# Patient Record
Sex: FEMALE | Race: WHITE | Employment: OTHER | URBAN - METROPOLITAN AREA
[De-identification: names, ages, dates, MRNs, and addresses within clinical notes are randomized per-mention and may not be internally consistent; named-entity substitution may affect disease eponyms.]

---

## 2017-01-17 ENCOUNTER — HOSPITAL ENCOUNTER (OUTPATIENT)
Dept: CT IMAGING | Age: 57
Discharge: HOME | End: 2017-01-17
Payer: MEDICARE

## 2017-01-17 DIAGNOSIS — R91.8 MULTIPLE LUNG NODULES ON CT: ICD-10-CM

## 2017-01-17 PROCEDURE — 71250 CT THORAX DX C-: CPT

## 2017-02-01 RX ORDER — SPIRONOLACTONE 25 MG/1
25 TABLET ORAL 2 TIMES DAILY
Qty: 60 TABLET | Refills: 0 | Status: SHIPPED | OUTPATIENT
Start: 2017-02-01 | End: 2017-03-07 | Stop reason: SDUPTHER

## 2017-02-07 ENCOUNTER — TELEPHONE (OUTPATIENT)
Dept: FAMILY MEDICINE CLINIC | Age: 57
End: 2017-02-07

## 2017-03-07 ENCOUNTER — OFFICE VISIT (OUTPATIENT)
Dept: PULMONOLOGY | Age: 57
End: 2017-03-07

## 2017-03-07 VITALS
TEMPERATURE: 98.3 F | OXYGEN SATURATION: 99 % | WEIGHT: 125 LBS | SYSTOLIC BLOOD PRESSURE: 120 MMHG | RESPIRATION RATE: 16 BRPM | HEART RATE: 70 BPM | HEIGHT: 62 IN | DIASTOLIC BLOOD PRESSURE: 74 MMHG | BODY MASS INDEX: 23 KG/M2

## 2017-03-07 DIAGNOSIS — J44.9 CHRONIC OBSTRUCTIVE PULMONARY DISEASE, UNSPECIFIED COPD TYPE (HCC): Primary | ICD-10-CM

## 2017-03-07 DIAGNOSIS — L25.9 CONTACT DERMATITIS, UNSPECIFIED CONTACT DERMATITIS TYPE, UNSPECIFIED TRIGGER: ICD-10-CM

## 2017-03-07 PROCEDURE — 99213 OFFICE O/P EST LOW 20 MIN: CPT | Performed by: INTERNAL MEDICINE

## 2017-03-07 RX ORDER — SPIRONOLACTONE 25 MG/1
25 TABLET ORAL 2 TIMES DAILY
Qty: 180 TABLET | Refills: 1 | Status: ON HOLD | OUTPATIENT
Start: 2017-03-07 | End: 2017-05-07 | Stop reason: HOSPADM

## 2017-03-07 ASSESSMENT — ENCOUNTER SYMPTOMS
SHORTNESS OF BREATH: 1
RHINORRHEA: 0
CHEST TIGHTNESS: 0
WHEEZING: 1
SINUS PRESSURE: 0
DIARRHEA: 0
ABDOMINAL PAIN: 0
SORE THROAT: 0
COUGH: 1
NAUSEA: 0
VOMITING: 0

## 2017-05-03 ENCOUNTER — HOSPITAL ENCOUNTER (INPATIENT)
Age: 57
LOS: 4 days | Discharge: HOME | DRG: 440 | End: 2017-05-07
Attending: EMERGENCY MEDICINE | Admitting: INTERNAL MEDICINE
Payer: MEDICARE

## 2017-05-03 ENCOUNTER — APPOINTMENT (OUTPATIENT)
Dept: CT IMAGING | Age: 57
DRG: 440 | End: 2017-05-03
Payer: MEDICARE

## 2017-05-03 ENCOUNTER — APPOINTMENT (OUTPATIENT)
Dept: GENERAL RADIOLOGY | Age: 57
DRG: 440 | End: 2017-05-03
Payer: MEDICARE

## 2017-05-03 DIAGNOSIS — K85.00 IDIOPATHIC ACUTE PANCREATITIS WITHOUT INFECTION OR NECROSIS: Primary | ICD-10-CM

## 2017-05-03 LAB
ALBUMIN SERPL-MCNC: 4.1 G/DL (ref 3.9–4.9)
ALP BLD-CCNC: 98 U/L (ref 40–130)
ALT SERPL-CCNC: 32 U/L (ref 0–33)
ANION GAP SERPL CALCULATED.3IONS-SCNC: 14 MEQ/L (ref 7–13)
AST SERPL-CCNC: 72 U/L (ref 0–35)
BASOPHILS ABSOLUTE: 0 K/UL (ref 0–0.2)
BASOPHILS RELATIVE PERCENT: 0.2 %
BILIRUB SERPL-MCNC: 1.1 MG/DL (ref 0–1.2)
BUN BLDV-MCNC: 12 MG/DL (ref 6–20)
CALCIUM SERPL-MCNC: 9.4 MG/DL (ref 8.6–10.2)
CHLORIDE BLD-SCNC: 101 MEQ/L (ref 98–107)
CO2: 27 MEQ/L (ref 22–29)
CREAT SERPL-MCNC: 0.64 MG/DL (ref 0.5–0.9)
EOSINOPHILS ABSOLUTE: 0.1 K/UL (ref 0–0.7)
EOSINOPHILS RELATIVE PERCENT: 0.6 %
GFR AFRICAN AMERICAN: >60
GFR NON-AFRICAN AMERICAN: >60
GLOBULIN: 2.9 G/DL (ref 2.3–3.5)
GLUCOSE BLD-MCNC: 119 MG/DL (ref 74–109)
HCT VFR BLD CALC: 39.8 % (ref 37–47)
HCT VFR BLD CALC: 41.1 % (ref 37–47)
HEMOGLOBIN: 13.2 G/DL (ref 12–16)
HEMOGLOBIN: 13.7 G/DL (ref 12–16)
LACTIC ACID: 3.4 MMOL/L (ref 0.5–2.2)
LIPASE: >600 U/L (ref 13–60)
LYMPHOCYTES ABSOLUTE: 1.6 K/UL (ref 1–4.8)
LYMPHOCYTES RELATIVE PERCENT: 11.9 %
MCH RBC QN AUTO: 31.7 PG (ref 27–31.3)
MCH RBC QN AUTO: 32.1 PG (ref 27–31.3)
MCHC RBC AUTO-ENTMCNC: 33.2 % (ref 33–37)
MCHC RBC AUTO-ENTMCNC: 33.4 % (ref 33–37)
MCV RBC AUTO: 94.7 FL (ref 82–100)
MCV RBC AUTO: 96.8 FL (ref 82–100)
MONOCYTES ABSOLUTE: 0.6 K/UL (ref 0.2–0.8)
MONOCYTES RELATIVE PERCENT: 4.5 %
NEUTROPHILS ABSOLUTE: 11.2 K/UL (ref 1.4–6.5)
NEUTROPHILS RELATIVE PERCENT: 82.8 %
PDW BLD-RTO: 14.2 % (ref 11.5–14.5)
PDW BLD-RTO: 14.4 % (ref 11.5–14.5)
PLATELET # BLD: 132 K/UL (ref 130–400)
PLATELET # BLD: 163 K/UL (ref 130–400)
POC CREATININE WHOLE BLOOD: NORMAL
POTASSIUM SERPL-SCNC: 4.2 MEQ/L (ref 3.5–5.1)
RBC # BLD: 4.11 M/UL (ref 4.2–5.4)
RBC # BLD: 4.34 M/UL (ref 4.2–5.4)
SODIUM BLD-SCNC: 142 MEQ/L (ref 132–144)
TOTAL PROTEIN: 7 G/DL (ref 6.4–8.1)
WBC # BLD: 10.2 K/UL (ref 4.8–10.8)
WBC # BLD: 13.5 K/UL (ref 4.8–10.8)

## 2017-05-03 PROCEDURE — 96374 THER/PROPH/DIAG INJ IV PUSH: CPT

## 2017-05-03 PROCEDURE — 1210000000 HC MED SURG R&B

## 2017-05-03 PROCEDURE — 6360000002 HC RX W HCPCS

## 2017-05-03 PROCEDURE — 6360000002 HC RX W HCPCS: Performed by: NURSE PRACTITIONER

## 2017-05-03 PROCEDURE — 83690 ASSAY OF LIPASE: CPT

## 2017-05-03 PROCEDURE — 96375 TX/PRO/DX INJ NEW DRUG ADDON: CPT

## 2017-05-03 PROCEDURE — 74176 CT ABD & PELVIS W/O CONTRAST: CPT

## 2017-05-03 PROCEDURE — 99285 EMERGENCY DEPT VISIT HI MDM: CPT

## 2017-05-03 PROCEDURE — 80053 COMPREHEN METABOLIC PANEL: CPT

## 2017-05-03 PROCEDURE — 85025 COMPLETE CBC W/AUTO DIFF WBC: CPT

## 2017-05-03 PROCEDURE — 71010 XR CHEST PORTABLE: CPT

## 2017-05-03 PROCEDURE — 2580000003 HC RX 258: Performed by: NURSE PRACTITIONER

## 2017-05-03 PROCEDURE — 36415 COLL VENOUS BLD VENIPUNCTURE: CPT

## 2017-05-03 PROCEDURE — 6360000002 HC RX W HCPCS: Performed by: EMERGENCY MEDICINE

## 2017-05-03 PROCEDURE — 83605 ASSAY OF LACTIC ACID: CPT

## 2017-05-03 PROCEDURE — 2580000003 HC RX 258: Performed by: EMERGENCY MEDICINE

## 2017-05-03 PROCEDURE — 85027 COMPLETE CBC AUTOMATED: CPT

## 2017-05-03 RX ORDER — 0.9 % SODIUM CHLORIDE 0.9 %
1000 INTRAVENOUS SOLUTION INTRAVENOUS ONCE
Status: COMPLETED | OUTPATIENT
Start: 2017-05-03 | End: 2017-05-03

## 2017-05-03 RX ORDER — SODIUM CHLORIDE 0.9 % (FLUSH) 0.9 %
10 SYRINGE (ML) INJECTION EVERY 12 HOURS SCHEDULED
Status: DISCONTINUED | OUTPATIENT
Start: 2017-05-03 | End: 2017-05-07 | Stop reason: HOSPADM

## 2017-05-03 RX ORDER — SODIUM CHLORIDE 0.9 % (FLUSH) 0.9 %
10 SYRINGE (ML) INJECTION PRN
Status: DISCONTINUED | OUTPATIENT
Start: 2017-05-03 | End: 2017-05-07 | Stop reason: HOSPADM

## 2017-05-03 RX ORDER — ACETAMINOPHEN 325 MG/1
650 TABLET ORAL EVERY 4 HOURS PRN
Status: DISCONTINUED | OUTPATIENT
Start: 2017-05-03 | End: 2017-05-07 | Stop reason: HOSPADM

## 2017-05-03 RX ORDER — ONDANSETRON 2 MG/ML
4 INJECTION INTRAMUSCULAR; INTRAVENOUS EVERY 6 HOURS PRN
Status: DISCONTINUED | OUTPATIENT
Start: 2017-05-03 | End: 2017-05-07 | Stop reason: HOSPADM

## 2017-05-03 RX ORDER — MORPHINE SULFATE 4 MG/ML
4 INJECTION, SOLUTION INTRAMUSCULAR; INTRAVENOUS
Status: DISCONTINUED | OUTPATIENT
Start: 2017-05-03 | End: 2017-05-07 | Stop reason: HOSPADM

## 2017-05-03 RX ORDER — ONDANSETRON 2 MG/ML
4 INJECTION INTRAMUSCULAR; INTRAVENOUS ONCE
Status: COMPLETED | OUTPATIENT
Start: 2017-05-03 | End: 2017-05-03

## 2017-05-03 RX ORDER — SODIUM CHLORIDE 9 MG/ML
INJECTION, SOLUTION INTRAVENOUS CONTINUOUS
Status: DISCONTINUED | OUTPATIENT
Start: 2017-05-03 | End: 2017-05-07 | Stop reason: HOSPADM

## 2017-05-03 RX ADMIN — SODIUM CHLORIDE 1000 ML: 9 INJECTION, SOLUTION INTRAVENOUS at 14:27

## 2017-05-03 RX ADMIN — HYDROMORPHONE HYDROCHLORIDE: 1 INJECTION, SOLUTION INTRAMUSCULAR; INTRAVENOUS; SUBCUTANEOUS at 15:20

## 2017-05-03 RX ADMIN — ONDANSETRON 4 MG: 2 INJECTION, SOLUTION INTRAMUSCULAR; INTRAVENOUS at 14:27

## 2017-05-03 RX ADMIN — ENOXAPARIN SODIUM 40 MG: 40 INJECTION SUBCUTANEOUS at 19:39

## 2017-05-03 RX ADMIN — MORPHINE SULFATE 4 MG: 4 INJECTION, SOLUTION INTRAMUSCULAR; INTRAVENOUS at 14:27

## 2017-05-03 RX ADMIN — Medication: at 15:20

## 2017-05-03 RX ADMIN — SODIUM CHLORIDE: 9 INJECTION, SOLUTION INTRAVENOUS at 19:23

## 2017-05-03 RX ADMIN — HYDROMORPHONE HYDROCHLORIDE 0.5 MG: 1 INJECTION, SOLUTION INTRAMUSCULAR; INTRAVENOUS; SUBCUTANEOUS at 23:34

## 2017-05-03 RX ADMIN — Medication 10 ML: at 19:44

## 2017-05-03 RX ADMIN — HYDROMORPHONE HYDROCHLORIDE 0.5 MG: 1 INJECTION, SOLUTION INTRAMUSCULAR; INTRAVENOUS; SUBCUTANEOUS at 19:40

## 2017-05-03 ASSESSMENT — PAIN SCALES - GENERAL
PAINLEVEL_OUTOF10: 10
PAINLEVEL_OUTOF10: 0
PAINLEVEL_OUTOF10: 8
PAINLEVEL_OUTOF10: 0
PAINLEVEL_OUTOF10: 9
PAINLEVEL_OUTOF10: 8
PAINLEVEL_OUTOF10: 9
PAINLEVEL_OUTOF10: 3
PAINLEVEL_OUTOF10: 8
PAINLEVEL_OUTOF10: 9
PAINLEVEL_OUTOF10: 0

## 2017-05-03 ASSESSMENT — ENCOUNTER SYMPTOMS
ALLERGIC/IMMUNOLOGIC NEGATIVE: 1
RHINORRHEA: 0
NAUSEA: 1
ABDOMINAL PAIN: 1
TROUBLE SWALLOWING: 0
SHORTNESS OF BREATH: 0
EYES NEGATIVE: 1
VOMITING: 1
WHEEZING: 0

## 2017-05-03 ASSESSMENT — PAIN DESCRIPTION - DESCRIPTORS
DESCRIPTORS: ACHING
DESCRIPTORS: ACHING;DISCOMFORT;PRESSURE
DESCRIPTORS: ACHING;CONSTANT

## 2017-05-03 ASSESSMENT — PAIN DESCRIPTION - ONSET
ONSET: ON-GOING
ONSET: PROGRESSIVE

## 2017-05-03 ASSESSMENT — PAIN DESCRIPTION - LOCATION
LOCATION: ABDOMEN

## 2017-05-03 ASSESSMENT — PAIN DESCRIPTION - PAIN TYPE
TYPE: ACUTE PAIN

## 2017-05-03 ASSESSMENT — PAIN DESCRIPTION - PROGRESSION: CLINICAL_PROGRESSION: NOT CHANGED

## 2017-05-03 ASSESSMENT — PAIN DESCRIPTION - FREQUENCY
FREQUENCY: CONTINUOUS
FREQUENCY: CONTINUOUS

## 2017-05-03 ASSESSMENT — PAIN SCALES - WONG BAKER: WONGBAKER_NUMERICALRESPONSE: 4

## 2017-05-04 ENCOUNTER — APPOINTMENT (OUTPATIENT)
Dept: NUCLEAR MEDICINE | Age: 57
DRG: 440 | End: 2017-05-04
Payer: MEDICARE

## 2017-05-04 LAB
ALBUMIN SERPL-MCNC: 3.4 G/DL (ref 3.9–4.9)
ALP BLD-CCNC: 71 U/L (ref 40–130)
ALT SERPL-CCNC: 20 U/L (ref 0–33)
AMYLASE: 575 U/L (ref 28–100)
ANION GAP SERPL CALCULATED.3IONS-SCNC: 13 MEQ/L (ref 7–13)
AST SERPL-CCNC: 30 U/L (ref 0–35)
BASOPHILS ABSOLUTE: 0 K/UL (ref 0–0.2)
BASOPHILS RELATIVE PERCENT: 0.3 %
BILIRUB SERPL-MCNC: 0.8 MG/DL (ref 0–1.2)
BUN BLDV-MCNC: 16 MG/DL (ref 6–20)
CALCIUM SERPL-MCNC: 7.6 MG/DL (ref 8.6–10.2)
CHLORIDE BLD-SCNC: 102 MEQ/L (ref 98–107)
CO2: 24 MEQ/L (ref 22–29)
CREAT SERPL-MCNC: 0.57 MG/DL (ref 0.5–0.9)
EOSINOPHILS ABSOLUTE: 0.1 K/UL (ref 0–0.7)
EOSINOPHILS RELATIVE PERCENT: 0.7 %
GFR AFRICAN AMERICAN: >60
GFR NON-AFRICAN AMERICAN: >60
GLOBULIN: 2.3 G/DL (ref 2.3–3.5)
GLUCOSE BLD-MCNC: 80 MG/DL (ref 74–109)
HCT VFR BLD CALC: 34.5 % (ref 37–47)
HEMOGLOBIN: 11.5 G/DL (ref 12–16)
LACTATE DEHYDROGENASE: 148 U/L (ref 135–214)
LIPASE: >600 U/L (ref 13–60)
LYMPHOCYTES ABSOLUTE: 3 K/UL (ref 1–4.8)
LYMPHOCYTES RELATIVE PERCENT: 24.8 %
MCH RBC QN AUTO: 31.6 PG (ref 27–31.3)
MCHC RBC AUTO-ENTMCNC: 33.4 % (ref 33–37)
MCV RBC AUTO: 94.5 FL (ref 82–100)
MONOCYTES ABSOLUTE: 0.5 K/UL (ref 0.2–0.8)
MONOCYTES RELATIVE PERCENT: 4.2 %
NEUTROPHILS ABSOLUTE: 8.5 K/UL (ref 1.4–6.5)
NEUTROPHILS RELATIVE PERCENT: 70 %
PDW BLD-RTO: 14.3 % (ref 11.5–14.5)
PLATELET # BLD: 134 K/UL (ref 130–400)
POTASSIUM SERPL-SCNC: 3.8 MEQ/L (ref 3.5–5.1)
RBC # BLD: 3.66 M/UL (ref 4.2–5.4)
SODIUM BLD-SCNC: 139 MEQ/L (ref 132–144)
TOTAL PROTEIN: 5.7 G/DL (ref 6.4–8.1)
WBC # BLD: 12.2 K/UL (ref 4.8–10.8)

## 2017-05-04 PROCEDURE — 83690 ASSAY OF LIPASE: CPT

## 2017-05-04 PROCEDURE — 36415 COLL VENOUS BLD VENIPUNCTURE: CPT

## 2017-05-04 PROCEDURE — 6360000002 HC RX W HCPCS: Performed by: INTERNAL MEDICINE

## 2017-05-04 PROCEDURE — 94664 DEMO&/EVAL PT USE INHALER: CPT

## 2017-05-04 PROCEDURE — 3430000000 HC RX DIAGNOSTIC RADIOPHARMACEUTICAL: Performed by: SURGERY

## 2017-05-04 PROCEDURE — 94640 AIRWAY INHALATION TREATMENT: CPT

## 2017-05-04 PROCEDURE — 83615 LACTATE (LD) (LDH) ENZYME: CPT

## 2017-05-04 PROCEDURE — A9537 TC99M MEBROFENIN: HCPCS | Performed by: SURGERY

## 2017-05-04 PROCEDURE — 2580000003 HC RX 258: Performed by: NURSE PRACTITIONER

## 2017-05-04 PROCEDURE — 6370000000 HC RX 637 (ALT 250 FOR IP): Performed by: INTERNAL MEDICINE

## 2017-05-04 PROCEDURE — 78227 HEPATOBIL SYST IMAGE W/DRUG: CPT

## 2017-05-04 PROCEDURE — 1210000000 HC MED SURG R&B

## 2017-05-04 PROCEDURE — 82150 ASSAY OF AMYLASE: CPT

## 2017-05-04 PROCEDURE — 85025 COMPLETE CBC W/AUTO DIFF WBC: CPT

## 2017-05-04 PROCEDURE — 80053 COMPREHEN METABOLIC PANEL: CPT

## 2017-05-04 PROCEDURE — 6360000002 HC RX W HCPCS: Performed by: NURSE PRACTITIONER

## 2017-05-04 RX ORDER — KETOROLAC TROMETHAMINE 15 MG/ML
15 INJECTION, SOLUTION INTRAMUSCULAR; INTRAVENOUS EVERY 6 HOURS PRN
Status: DISCONTINUED | OUTPATIENT
Start: 2017-05-04 | End: 2017-05-07 | Stop reason: HOSPADM

## 2017-05-04 RX ORDER — IPRATROPIUM BROMIDE AND ALBUTEROL SULFATE 2.5; .5 MG/3ML; MG/3ML
1 SOLUTION RESPIRATORY (INHALATION) EVERY 4 HOURS PRN
Status: DISCONTINUED | OUTPATIENT
Start: 2017-05-04 | End: 2017-05-07 | Stop reason: HOSPADM

## 2017-05-04 RX ADMIN — IPRATROPIUM BROMIDE AND ALBUTEROL SULFATE 1 AMPULE: .5; 3 SOLUTION RESPIRATORY (INHALATION) at 15:16

## 2017-05-04 RX ADMIN — SODIUM CHLORIDE: 9 INJECTION, SOLUTION INTRAVENOUS at 22:40

## 2017-05-04 RX ADMIN — ENOXAPARIN SODIUM 40 MG: 40 INJECTION SUBCUTANEOUS at 09:03

## 2017-05-04 RX ADMIN — HYDROMORPHONE HYDROCHLORIDE 0.5 MG: 1 INJECTION, SOLUTION INTRAMUSCULAR; INTRAVENOUS; SUBCUTANEOUS at 05:48

## 2017-05-04 RX ADMIN — HYDROMORPHONE HYDROCHLORIDE 0.5 MG: 1 INJECTION, SOLUTION INTRAMUSCULAR; INTRAVENOUS; SUBCUTANEOUS at 09:03

## 2017-05-04 RX ADMIN — Medication 10 ML: at 20:02

## 2017-05-04 RX ADMIN — KETOROLAC TROMETHAMINE 15 MG: 15 INJECTION, SOLUTION INTRAMUSCULAR; INTRAVENOUS at 22:40

## 2017-05-04 RX ADMIN — Medication 10 ML: at 09:04

## 2017-05-04 RX ADMIN — IPRATROPIUM BROMIDE AND ALBUTEROL SULFATE 1 AMPULE: .5; 3 SOLUTION RESPIRATORY (INHALATION) at 22:44

## 2017-05-04 RX ADMIN — KETOROLAC TROMETHAMINE 15 MG: 15 INJECTION, SOLUTION INTRAMUSCULAR; INTRAVENOUS at 15:28

## 2017-05-04 RX ADMIN — HYDROMORPHONE HYDROCHLORIDE 0.5 MG: 1 INJECTION, SOLUTION INTRAMUSCULAR; INTRAVENOUS; SUBCUTANEOUS at 02:39

## 2017-05-04 RX ADMIN — Medication 10 ML: at 22:40

## 2017-05-04 RX ADMIN — SODIUM CHLORIDE: 9 INJECTION, SOLUTION INTRAVENOUS at 01:32

## 2017-05-04 RX ADMIN — SODIUM CHLORIDE: 9 INJECTION, SOLUTION INTRAVENOUS at 09:03

## 2017-05-04 RX ADMIN — Medication 7.8 MILLICURIE: at 19:55

## 2017-05-04 ASSESSMENT — PAIN DESCRIPTION - PROGRESSION
CLINICAL_PROGRESSION: NOT CHANGED

## 2017-05-04 ASSESSMENT — PAIN SCALES - WONG BAKER
WONGBAKER_NUMERICALRESPONSE: 4

## 2017-05-04 ASSESSMENT — PAIN SCALES - GENERAL
PAINLEVEL_OUTOF10: 8
PAINLEVEL_OUTOF10: 7

## 2017-05-04 ASSESSMENT — PAIN DESCRIPTION - LOCATION: LOCATION: ABDOMEN

## 2017-05-05 PROCEDURE — 6370000000 HC RX 637 (ALT 250 FOR IP): Performed by: INTERNAL MEDICINE

## 2017-05-05 PROCEDURE — 94640 AIRWAY INHALATION TREATMENT: CPT

## 2017-05-05 PROCEDURE — 2580000003 HC RX 258: Performed by: NURSE PRACTITIONER

## 2017-05-05 PROCEDURE — 1210000000 HC MED SURG R&B

## 2017-05-05 RX ADMIN — Medication 10 ML: at 20:45

## 2017-05-05 RX ADMIN — IPRATROPIUM BROMIDE AND ALBUTEROL SULFATE 1 AMPULE: .5; 3 SOLUTION RESPIRATORY (INHALATION) at 20:51

## 2017-05-05 RX ADMIN — SODIUM CHLORIDE: 9 INJECTION, SOLUTION INTRAVENOUS at 06:15

## 2017-05-05 RX ADMIN — IPRATROPIUM BROMIDE AND ALBUTEROL SULFATE 1 AMPULE: .5; 3 SOLUTION RESPIRATORY (INHALATION) at 14:55

## 2017-05-05 RX ADMIN — SODIUM CHLORIDE: 9 INJECTION, SOLUTION INTRAVENOUS at 20:45

## 2017-05-05 RX ADMIN — SODIUM CHLORIDE: 9 INJECTION, SOLUTION INTRAVENOUS at 15:16

## 2017-05-05 RX ADMIN — IPRATROPIUM BROMIDE AND ALBUTEROL SULFATE 1 AMPULE: .5; 3 SOLUTION RESPIRATORY (INHALATION) at 07:09

## 2017-05-05 ASSESSMENT — PAIN DESCRIPTION - LOCATION: LOCATION: EYE

## 2017-05-05 ASSESSMENT — PAIN DESCRIPTION - ORIENTATION: ORIENTATION: RIGHT;LEFT

## 2017-05-05 ASSESSMENT — PAIN DESCRIPTION - FREQUENCY: FREQUENCY: INTERMITTENT

## 2017-05-05 ASSESSMENT — PAIN DESCRIPTION - PAIN TYPE: TYPE: CHRONIC PAIN

## 2017-05-06 LAB
AMYLASE: 145 U/L (ref 28–100)
ANION GAP SERPL CALCULATED.3IONS-SCNC: 10 MEQ/L (ref 7–13)
BASOPHILS ABSOLUTE: 0 K/UL (ref 0–0.2)
BASOPHILS RELATIVE PERCENT: 0.6 %
BUN BLDV-MCNC: 7 MG/DL (ref 6–20)
CALCIUM SERPL-MCNC: 7.9 MG/DL (ref 8.6–10.2)
CHLORIDE BLD-SCNC: 108 MEQ/L (ref 98–107)
CO2: 26 MEQ/L (ref 22–29)
CREAT SERPL-MCNC: 0.43 MG/DL (ref 0.5–0.9)
EOSINOPHILS ABSOLUTE: 0.3 K/UL (ref 0–0.7)
EOSINOPHILS RELATIVE PERCENT: 5 %
GFR AFRICAN AMERICAN: >60
GFR NON-AFRICAN AMERICAN: >60
GLUCOSE BLD-MCNC: 86 MG/DL (ref 74–109)
HCT VFR BLD CALC: 33.3 % (ref 37–47)
HEMOGLOBIN: 11.3 G/DL (ref 12–16)
LIPASE: 467 U/L (ref 13–60)
LYMPHOCYTES ABSOLUTE: 1.9 K/UL (ref 1–4.8)
LYMPHOCYTES RELATIVE PERCENT: 30.8 %
MAGNESIUM: 2.1 MG/DL (ref 1.7–2.3)
MCH RBC QN AUTO: 31.9 PG (ref 27–31.3)
MCHC RBC AUTO-ENTMCNC: 33.8 % (ref 33–37)
MCV RBC AUTO: 94.5 FL (ref 82–100)
MONOCYTES ABSOLUTE: 0.4 K/UL (ref 0.2–0.8)
MONOCYTES RELATIVE PERCENT: 5.8 %
NEUTROPHILS ABSOLUTE: 3.6 K/UL (ref 1.4–6.5)
NEUTROPHILS RELATIVE PERCENT: 57.8 %
PDW BLD-RTO: 14.1 % (ref 11.5–14.5)
PHOSPHORUS: 2.5 MG/DL (ref 2.5–4.5)
PLATELET # BLD: 114 K/UL (ref 130–400)
POTASSIUM SERPL-SCNC: 3.4 MEQ/L (ref 3.5–5.1)
RBC # BLD: 3.53 M/UL (ref 4.2–5.4)
SODIUM BLD-SCNC: 144 MEQ/L (ref 132–144)
WBC # BLD: 6.2 K/UL (ref 4.8–10.8)

## 2017-05-06 PROCEDURE — 94640 AIRWAY INHALATION TREATMENT: CPT

## 2017-05-06 PROCEDURE — 85025 COMPLETE CBC W/AUTO DIFF WBC: CPT

## 2017-05-06 PROCEDURE — 6360000002 HC RX W HCPCS: Performed by: NURSE PRACTITIONER

## 2017-05-06 PROCEDURE — 83735 ASSAY OF MAGNESIUM: CPT

## 2017-05-06 PROCEDURE — 2580000003 HC RX 258: Performed by: NURSE PRACTITIONER

## 2017-05-06 PROCEDURE — 80048 BASIC METABOLIC PNL TOTAL CA: CPT

## 2017-05-06 PROCEDURE — 82150 ASSAY OF AMYLASE: CPT

## 2017-05-06 PROCEDURE — 83690 ASSAY OF LIPASE: CPT

## 2017-05-06 PROCEDURE — 6370000000 HC RX 637 (ALT 250 FOR IP): Performed by: INTERNAL MEDICINE

## 2017-05-06 PROCEDURE — 84100 ASSAY OF PHOSPHORUS: CPT

## 2017-05-06 PROCEDURE — 36415 COLL VENOUS BLD VENIPUNCTURE: CPT

## 2017-05-06 PROCEDURE — 1210000000 HC MED SURG R&B

## 2017-05-06 RX ORDER — SPIRONOLACTONE 25 MG/1
25 TABLET ORAL 2 TIMES DAILY
Status: DISCONTINUED | OUTPATIENT
Start: 2017-05-06 | End: 2017-05-06

## 2017-05-06 RX ORDER — POTASSIUM CHLORIDE 20MEQ/15ML
40 LIQUID (ML) ORAL ONCE
Status: COMPLETED | OUTPATIENT
Start: 2017-05-06 | End: 2017-05-06

## 2017-05-06 RX ORDER — BUDESONIDE 0.5 MG/2ML
500 INHALANT ORAL 2 TIMES DAILY
Status: DISCONTINUED | OUTPATIENT
Start: 2017-05-06 | End: 2017-05-07 | Stop reason: HOSPADM

## 2017-05-06 RX ADMIN — POTASSIUM CHLORIDE 40 MEQ: 20 SOLUTION ORAL at 10:17

## 2017-05-06 RX ADMIN — IPRATROPIUM BROMIDE AND ALBUTEROL SULFATE 1 AMPULE: .5; 3 SOLUTION RESPIRATORY (INHALATION) at 08:23

## 2017-05-06 RX ADMIN — ENOXAPARIN SODIUM 40 MG: 40 INJECTION SUBCUTANEOUS at 10:17

## 2017-05-06 RX ADMIN — IPRATROPIUM BROMIDE AND ALBUTEROL SULFATE 1 AMPULE: .5; 3 SOLUTION RESPIRATORY (INHALATION) at 18:40

## 2017-05-06 RX ADMIN — Medication 10 ML: at 23:24

## 2017-05-06 ASSESSMENT — PAIN SCALES - GENERAL
PAINLEVEL_OUTOF10: 0

## 2017-05-07 VITALS
HEART RATE: 77 BPM | DIASTOLIC BLOOD PRESSURE: 79 MMHG | HEIGHT: 62 IN | SYSTOLIC BLOOD PRESSURE: 147 MMHG | OXYGEN SATURATION: 100 % | RESPIRATION RATE: 18 BRPM | WEIGHT: 115 LBS | TEMPERATURE: 97.5 F | BODY MASS INDEX: 21.16 KG/M2

## 2017-05-07 LAB
AMYLASE: 121 U/L (ref 28–100)
ANION GAP SERPL CALCULATED.3IONS-SCNC: 13 MEQ/L (ref 7–13)
BUN BLDV-MCNC: 7 MG/DL (ref 6–20)
CALCIUM SERPL-MCNC: 8.5 MG/DL (ref 8.6–10.2)
CHLORIDE BLD-SCNC: 109 MEQ/L (ref 98–107)
CO2: 24 MEQ/L (ref 22–29)
CREAT SERPL-MCNC: 0.61 MG/DL (ref 0.5–0.9)
GFR AFRICAN AMERICAN: >60
GFR NON-AFRICAN AMERICAN: >60
GLUCOSE BLD-MCNC: 86 MG/DL (ref 74–109)
LIPASE: 534 U/L (ref 13–60)
POTASSIUM SERPL-SCNC: 3.7 MEQ/L (ref 3.5–5.1)
SODIUM BLD-SCNC: 146 MEQ/L (ref 132–144)

## 2017-05-07 PROCEDURE — 6360000002 HC RX W HCPCS: Performed by: NURSE PRACTITIONER

## 2017-05-07 PROCEDURE — 94640 AIRWAY INHALATION TREATMENT: CPT

## 2017-05-07 PROCEDURE — 6370000000 HC RX 637 (ALT 250 FOR IP): Performed by: INTERNAL MEDICINE

## 2017-05-07 PROCEDURE — 83690 ASSAY OF LIPASE: CPT

## 2017-05-07 PROCEDURE — 2580000003 HC RX 258: Performed by: NURSE PRACTITIONER

## 2017-05-07 PROCEDURE — 82150 ASSAY OF AMYLASE: CPT

## 2017-05-07 PROCEDURE — 80048 BASIC METABOLIC PNL TOTAL CA: CPT

## 2017-05-07 PROCEDURE — 36415 COLL VENOUS BLD VENIPUNCTURE: CPT

## 2017-05-07 RX ORDER — DICYCLOMINE HCL 20 MG
20 TABLET ORAL 3 TIMES DAILY PRN
Qty: 40 TABLET | Refills: 0 | Status: SHIPPED | OUTPATIENT
Start: 2017-05-07 | End: 2017-09-01

## 2017-05-07 RX ORDER — PANTOPRAZOLE SODIUM 20 MG/1
20 TABLET, DELAYED RELEASE ORAL DAILY
Qty: 90 TABLET | Refills: 0 | Status: SHIPPED | OUTPATIENT
Start: 2017-05-07 | End: 2017-09-01

## 2017-05-07 RX ORDER — OMEPRAZOLE 40 MG/1
40 CAPSULE, DELAYED RELEASE ORAL DAILY
Qty: 30 CAPSULE | Refills: 0 | Status: SHIPPED | OUTPATIENT
Start: 2017-05-07 | End: 2017-07-05

## 2017-05-07 RX ADMIN — Medication 10 ML: at 08:52

## 2017-05-07 RX ADMIN — IPRATROPIUM BROMIDE AND ALBUTEROL SULFATE 1 AMPULE: .5; 3 SOLUTION RESPIRATORY (INHALATION) at 08:21

## 2017-05-07 RX ADMIN — ENOXAPARIN SODIUM 40 MG: 40 INJECTION SUBCUTANEOUS at 08:51

## 2017-05-07 ASSESSMENT — PAIN DESCRIPTION - PAIN TYPE: TYPE: CHRONIC PAIN

## 2017-05-07 ASSESSMENT — PAIN DESCRIPTION - FREQUENCY: FREQUENCY: INTERMITTENT

## 2017-05-07 ASSESSMENT — PAIN DESCRIPTION - ORIENTATION: ORIENTATION: MID

## 2017-05-07 ASSESSMENT — PAIN DESCRIPTION - LOCATION: LOCATION: ABDOMEN

## 2017-05-07 ASSESSMENT — PAIN SCALES - GENERAL: PAINLEVEL_OUTOF10: 4

## 2017-05-11 ENCOUNTER — TELEPHONE (OUTPATIENT)
Dept: FAMILY MEDICINE CLINIC | Age: 57
End: 2017-05-11

## 2017-05-23 ENCOUNTER — TELEPHONE (OUTPATIENT)
Dept: FAMILY MEDICINE CLINIC | Age: 57
End: 2017-05-23

## 2017-05-30 ENCOUNTER — TELEPHONE (OUTPATIENT)
Dept: FAMILY MEDICINE CLINIC | Age: 57
End: 2017-05-30

## 2017-05-30 DIAGNOSIS — R10.9 ABDOMINAL PAIN, UNSPECIFIED LOCATION: Primary | ICD-10-CM

## 2017-05-30 DIAGNOSIS — R10.9 ABDOMINAL PAIN, UNSPECIFIED LOCATION: ICD-10-CM

## 2017-05-30 LAB
ALBUMIN SERPL-MCNC: 4.2 G/DL (ref 3.9–4.9)
ALP BLD-CCNC: 71 U/L (ref 40–130)
ALT SERPL-CCNC: 13 U/L (ref 0–33)
AMYLASE: 85 U/L (ref 28–100)
ANION GAP SERPL CALCULATED.3IONS-SCNC: 13 MEQ/L (ref 7–13)
AST SERPL-CCNC: 19 U/L (ref 0–35)
BILIRUB SERPL-MCNC: 0.4 MG/DL (ref 0–1.2)
BUN BLDV-MCNC: 7 MG/DL (ref 6–20)
CALCIUM SERPL-MCNC: 9.3 MG/DL (ref 8.6–10.2)
CHLORIDE BLD-SCNC: 100 MEQ/L (ref 98–107)
CO2: 27 MEQ/L (ref 22–29)
CREAT SERPL-MCNC: 0.62 MG/DL (ref 0.5–0.9)
GFR AFRICAN AMERICAN: >60
GFR NON-AFRICAN AMERICAN: >60
GLOBULIN: 2.6 G/DL (ref 2.3–3.5)
GLUCOSE BLD-MCNC: 77 MG/DL (ref 74–109)
LIPASE: 67 U/L (ref 13–60)
POTASSIUM SERPL-SCNC: 4.1 MEQ/L (ref 3.5–5.1)
SODIUM BLD-SCNC: 140 MEQ/L (ref 132–144)
TOTAL PROTEIN: 6.8 G/DL (ref 6.4–8.1)

## 2017-07-03 ENCOUNTER — TELEPHONE (OUTPATIENT)
Dept: FAMILY MEDICINE CLINIC | Age: 57
End: 2017-07-03

## 2017-07-05 ENCOUNTER — OFFICE VISIT (OUTPATIENT)
Dept: SURGERY | Age: 57
End: 2017-07-05

## 2017-07-05 VITALS
BODY MASS INDEX: 21.71 KG/M2 | TEMPERATURE: 97.8 F | WEIGHT: 118 LBS | DIASTOLIC BLOOD PRESSURE: 60 MMHG | HEIGHT: 62 IN | SYSTOLIC BLOOD PRESSURE: 114 MMHG

## 2017-07-05 DIAGNOSIS — K80.10 CHRONIC CHOLECYSTITIS WITH CALCULUS: Primary | ICD-10-CM

## 2017-07-05 DIAGNOSIS — R10.84 GENERALIZED ABDOMINAL PAIN: Primary | ICD-10-CM

## 2017-07-05 PROCEDURE — 99202 OFFICE O/P NEW SF 15 MIN: CPT | Performed by: SURGERY

## 2017-07-05 RX ORDER — ALBUTEROL SULFATE 2.5 MG/3ML
SOLUTION RESPIRATORY (INHALATION)
COMMUNITY
Start: 2017-04-28 | End: 2017-08-25 | Stop reason: SDUPTHER

## 2017-07-05 RX ORDER — ARFORMOTEROL TARTRATE 15 UG/2ML
SOLUTION RESPIRATORY (INHALATION)
COMMUNITY
Start: 2017-06-30 | End: 2017-09-16 | Stop reason: SDUPTHER

## 2017-07-05 RX ORDER — OXYCODONE HYDROCHLORIDE AND ACETAMINOPHEN 5; 325 MG/1; MG/1
TABLET ORAL
Qty: 40 TABLET | Refills: 0 | Status: SHIPPED | OUTPATIENT
Start: 2017-07-05 | End: 2020-04-02

## 2017-07-05 ASSESSMENT — ENCOUNTER SYMPTOMS
ALLERGIC/IMMUNOLOGIC NEGATIVE: 1
NAUSEA: 0
CHEST TIGHTNESS: 0
ABDOMINAL DISTENTION: 0
BLOOD IN STOOL: 0
RESPIRATORY NEGATIVE: 1
EYES NEGATIVE: 1
ABDOMINAL PAIN: 1
RECTAL PAIN: 0
RHINORRHEA: 0
COLOR CHANGE: 0
SHORTNESS OF BREATH: 0

## 2017-07-11 ENCOUNTER — HOSPITAL ENCOUNTER (OUTPATIENT)
Dept: PREADMISSION TESTING | Age: 57
Discharge: HOME | End: 2017-07-11
Payer: MEDICARE

## 2017-07-11 VITALS
OXYGEN SATURATION: 98 % | HEART RATE: 72 BPM | DIASTOLIC BLOOD PRESSURE: 65 MMHG | RESPIRATION RATE: 16 BRPM | WEIGHT: 118.5 LBS | BODY MASS INDEX: 21.81 KG/M2 | SYSTOLIC BLOOD PRESSURE: 123 MMHG | TEMPERATURE: 98.6 F | HEIGHT: 62 IN

## 2017-07-11 DIAGNOSIS — H53.143 PHOTOPHOBIA OF BOTH EYES: Chronic | ICD-10-CM

## 2017-07-11 DIAGNOSIS — K81.9 CHOLECYSTITIS: ICD-10-CM

## 2017-07-11 DIAGNOSIS — Z87.891 FORMER SMOKER, STOPPED SMOKING IN DISTANT PAST: Chronic | ICD-10-CM

## 2017-07-11 LAB
ANION GAP SERPL CALCULATED.3IONS-SCNC: 13 MEQ/L (ref 7–13)
BUN BLDV-MCNC: 10 MG/DL (ref 6–20)
CALCIUM SERPL-MCNC: 9.1 MG/DL (ref 8.6–10.2)
CHLORIDE BLD-SCNC: 96 MEQ/L (ref 98–107)
CO2: 26 MEQ/L (ref 22–29)
CREAT SERPL-MCNC: 0.61 MG/DL (ref 0.5–0.9)
EKG ATRIAL RATE: 50 BPM
EKG P AXIS: 54 DEGREES
EKG P-R INTERVAL: 130 MS
EKG Q-T INTERVAL: 430 MS
EKG QRS DURATION: 78 MS
EKG QTC CALCULATION (BAZETT): 392 MS
EKG R AXIS: 67 DEGREES
EKG T AXIS: 65 DEGREES
EKG VENTRICULAR RATE: 50 BPM
GFR AFRICAN AMERICAN: >60
GFR NON-AFRICAN AMERICAN: >60
GLUCOSE BLD-MCNC: 66 MG/DL (ref 74–109)
HCT VFR BLD CALC: 37.9 % (ref 37–47)
HEMOGLOBIN: 12.3 G/DL (ref 12–16)
MCH RBC QN AUTO: 30.7 PG (ref 27–31.3)
MCHC RBC AUTO-ENTMCNC: 32.4 % (ref 33–37)
MCV RBC AUTO: 94.7 FL (ref 82–100)
PDW BLD-RTO: 14.5 % (ref 11.5–14.5)
PLATELET # BLD: 152 K/UL (ref 130–400)
POTASSIUM SERPL-SCNC: 4.3 MEQ/L (ref 3.5–5.1)
RBC # BLD: 4 M/UL (ref 4.2–5.4)
SODIUM BLD-SCNC: 135 MEQ/L (ref 132–144)
WBC # BLD: 5.2 K/UL (ref 4.8–10.8)

## 2017-07-11 PROCEDURE — 93005 ELECTROCARDIOGRAM TRACING: CPT

## 2017-07-11 PROCEDURE — 80048 BASIC METABOLIC PNL TOTAL CA: CPT

## 2017-07-11 PROCEDURE — 85027 COMPLETE CBC AUTOMATED: CPT

## 2017-07-11 RX ORDER — SODIUM CHLORIDE 0.9 % (FLUSH) 0.9 %
10 SYRINGE (ML) INJECTION EVERY 12 HOURS SCHEDULED
Status: CANCELLED | OUTPATIENT
Start: 2017-07-11

## 2017-07-11 RX ORDER — SODIUM CHLORIDE 0.9 % (FLUSH) 0.9 %
10 SYRINGE (ML) INJECTION PRN
Status: CANCELLED | OUTPATIENT
Start: 2017-07-11

## 2017-07-11 RX ORDER — MAGNESIUM 30 MG
30 TABLET ORAL DAILY
COMMUNITY

## 2017-07-11 RX ORDER — MULTIVIT WITH MINERALS/LUTEIN
1000 TABLET ORAL DAILY
COMMUNITY

## 2017-07-11 RX ORDER — LIDOCAINE HYDROCHLORIDE 10 MG/ML
1 INJECTION, SOLUTION EPIDURAL; INFILTRATION; INTRACAUDAL; PERINEURAL
Status: CANCELLED | OUTPATIENT
Start: 2017-07-11 | End: 2017-07-11

## 2017-07-11 RX ORDER — SODIUM CHLORIDE, SODIUM LACTATE, POTASSIUM CHLORIDE, CALCIUM CHLORIDE 600; 310; 30; 20 MG/100ML; MG/100ML; MG/100ML; MG/100ML
INJECTION, SOLUTION INTRAVENOUS CONTINUOUS
Status: CANCELLED | OUTPATIENT
Start: 2017-07-11

## 2017-07-11 RX ORDER — UBIDECARENONE 75 MG
50 CAPSULE ORAL DAILY
COMMUNITY

## 2017-07-11 RX ORDER — CALCIUM CARBONATE 500(1250)
500 TABLET ORAL DAILY
COMMUNITY
End: 2018-05-01

## 2017-07-11 ASSESSMENT — ENCOUNTER SYMPTOMS
VOMITING: 0
BACK PAIN: 1
STRIDOR: 0
COUGH: 0
NAUSEA: 0
ABDOMINAL PAIN: 1
WHEEZING: 0
CONSTIPATION: 0
HEARTBURN: 0
DIARRHEA: 0
SORE THROAT: 0
SHORTNESS OF BREATH: 0

## 2017-07-13 ENCOUNTER — ANESTHESIA EVENT (OUTPATIENT)
Dept: OPERATING ROOM | Age: 57
End: 2017-07-13
Payer: MEDICARE

## 2017-07-14 ENCOUNTER — HOSPITAL ENCOUNTER (OUTPATIENT)
Age: 57
Setting detail: OUTPATIENT SURGERY
Discharge: HOME | End: 2017-07-14
Attending: SURGERY | Admitting: SURGERY
Payer: MEDICARE

## 2017-07-14 ENCOUNTER — ANESTHESIA (OUTPATIENT)
Dept: OPERATING ROOM | Age: 57
End: 2017-07-14
Payer: MEDICARE

## 2017-07-14 ENCOUNTER — APPOINTMENT (OUTPATIENT)
Dept: GENERAL RADIOLOGY | Age: 57
End: 2017-07-14
Attending: SURGERY
Payer: MEDICARE

## 2017-07-14 VITALS — SYSTOLIC BLOOD PRESSURE: 114 MMHG | DIASTOLIC BLOOD PRESSURE: 70 MMHG | TEMPERATURE: 95.2 F | OXYGEN SATURATION: 100 %

## 2017-07-14 VITALS
BODY MASS INDEX: 21.71 KG/M2 | SYSTOLIC BLOOD PRESSURE: 144 MMHG | DIASTOLIC BLOOD PRESSURE: 88 MMHG | WEIGHT: 118 LBS | RESPIRATION RATE: 16 BRPM | HEART RATE: 68 BPM | OXYGEN SATURATION: 94 % | TEMPERATURE: 97.4 F | HEIGHT: 62 IN

## 2017-07-14 LAB
INR BLD: 1.1
PROTHROMBIN TIME: 12.3 SEC (ref 8.1–13.7)

## 2017-07-14 PROCEDURE — 6360000002 HC RX W HCPCS: Performed by: NURSE ANESTHETIST, CERTIFIED REGISTERED

## 2017-07-14 PROCEDURE — 85610 PROTHROMBIN TIME: CPT

## 2017-07-14 PROCEDURE — 88307 TISSUE EXAM BY PATHOLOGIST: CPT

## 2017-07-14 PROCEDURE — 6360000002 HC RX W HCPCS: Performed by: SURGERY

## 2017-07-14 PROCEDURE — 7100000010 HC PHASE II RECOVERY - FIRST 15 MIN: Performed by: SURGERY

## 2017-07-14 PROCEDURE — 2500000003 HC RX 250 WO HCPCS: Performed by: SURGERY

## 2017-07-14 PROCEDURE — 2580000003 HC RX 258: Performed by: STUDENT IN AN ORGANIZED HEALTH CARE EDUCATION/TRAINING PROGRAM

## 2017-07-14 PROCEDURE — 2720000010 HC SURG SUPPLY STERILE: Performed by: SURGERY

## 2017-07-14 PROCEDURE — 88304 TISSUE EXAM BY PATHOLOGIST: CPT

## 2017-07-14 PROCEDURE — 88313 SPECIAL STAINS GROUP 2: CPT

## 2017-07-14 PROCEDURE — 6360000004 HC RX CONTRAST MEDICATION: Performed by: SURGERY

## 2017-07-14 PROCEDURE — 7100000011 HC PHASE II RECOVERY - ADDTL 15 MIN: Performed by: SURGERY

## 2017-07-14 PROCEDURE — 7100000000 HC PACU RECOVERY - FIRST 15 MIN: Performed by: SURGERY

## 2017-07-14 PROCEDURE — 7100000001 HC PACU RECOVERY - ADDTL 15 MIN: Performed by: SURGERY

## 2017-07-14 PROCEDURE — 3600000014 HC SURGERY LEVEL 4 ADDTL 15MIN: Performed by: SURGERY

## 2017-07-14 PROCEDURE — 3700000000 HC ANESTHESIA ATTENDED CARE: Performed by: SURGERY

## 2017-07-14 PROCEDURE — 2500000003 HC RX 250 WO HCPCS: Performed by: NURSE ANESTHETIST, CERTIFIED REGISTERED

## 2017-07-14 PROCEDURE — 3700000001 HC ADD 15 MINUTES (ANESTHESIA): Performed by: SURGERY

## 2017-07-14 PROCEDURE — 76000 FLUOROSCOPY <1 HR PHYS/QHP: CPT

## 2017-07-14 PROCEDURE — 6360000002 HC RX W HCPCS: Performed by: ANESTHESIOLOGY

## 2017-07-14 PROCEDURE — 3600000004 HC SURGERY LEVEL 4 BASE: Performed by: SURGERY

## 2017-07-14 PROCEDURE — 2580000003 HC RX 258: Performed by: SURGERY

## 2017-07-14 RX ORDER — ACETAMINOPHEN AND CODEINE PHOSPHATE 300; 30 MG/1; MG/1
1 TABLET ORAL EVERY 4 HOURS PRN
Status: DISCONTINUED | OUTPATIENT
Start: 2017-07-14 | End: 2017-07-14 | Stop reason: HOSPADM

## 2017-07-14 RX ORDER — ONDANSETRON 2 MG/ML
INJECTION INTRAMUSCULAR; INTRAVENOUS PRN
Status: DISCONTINUED | OUTPATIENT
Start: 2017-07-14 | End: 2017-07-14 | Stop reason: SDUPTHER

## 2017-07-14 RX ORDER — FENTANYL CITRATE 50 UG/ML
INJECTION, SOLUTION INTRAMUSCULAR; INTRAVENOUS PRN
Status: DISCONTINUED | OUTPATIENT
Start: 2017-07-14 | End: 2017-07-14 | Stop reason: SDUPTHER

## 2017-07-14 RX ORDER — MEPERIDINE HYDROCHLORIDE 25 MG/ML
12.5 INJECTION INTRAMUSCULAR; INTRAVENOUS; SUBCUTANEOUS EVERY 5 MIN PRN
Status: DISCONTINUED | OUTPATIENT
Start: 2017-07-14 | End: 2017-07-14 | Stop reason: HOSPADM

## 2017-07-14 RX ORDER — MAGNESIUM HYDROXIDE 1200 MG/15ML
LIQUID ORAL CONTINUOUS PRN
Status: DISCONTINUED | OUTPATIENT
Start: 2017-07-14 | End: 2017-07-14 | Stop reason: HOSPADM

## 2017-07-14 RX ORDER — DIPHENHYDRAMINE HYDROCHLORIDE 50 MG/ML
12.5 INJECTION INTRAMUSCULAR; INTRAVENOUS
Status: DISCONTINUED | OUTPATIENT
Start: 2017-07-14 | End: 2017-07-14 | Stop reason: HOSPADM

## 2017-07-14 RX ORDER — ROCURONIUM BROMIDE 10 MG/ML
INJECTION, SOLUTION INTRAVENOUS PRN
Status: DISCONTINUED | OUTPATIENT
Start: 2017-07-14 | End: 2017-07-14 | Stop reason: SDUPTHER

## 2017-07-14 RX ORDER — PROPOFOL 10 MG/ML
INJECTION, EMULSION INTRAVENOUS PRN
Status: DISCONTINUED | OUTPATIENT
Start: 2017-07-14 | End: 2017-07-14 | Stop reason: SDUPTHER

## 2017-07-14 RX ORDER — SODIUM CHLORIDE, SODIUM LACTATE, POTASSIUM CHLORIDE, CALCIUM CHLORIDE 600; 310; 30; 20 MG/100ML; MG/100ML; MG/100ML; MG/100ML
INJECTION, SOLUTION INTRAVENOUS
Status: DISCONTINUED
Start: 2017-07-14 | End: 2017-07-14 | Stop reason: HOSPADM

## 2017-07-14 RX ORDER — DIMETHICONE, CAMPHOR (SYNTHETIC), MENTHOL, AND PHENOL 1.1; .5; .625; .5 G/100G; G/100G; G/100G; G/100G
OINTMENT TOPICAL
Status: DISCONTINUED
Start: 2017-07-14 | End: 2017-07-14 | Stop reason: HOSPADM

## 2017-07-14 RX ORDER — LIDOCAINE HYDROCHLORIDE 10 MG/ML
1 INJECTION, SOLUTION EPIDURAL; INFILTRATION; INTRACAUDAL; PERINEURAL
Status: DISCONTINUED | OUTPATIENT
Start: 2017-07-14 | End: 2017-07-14

## 2017-07-14 RX ORDER — BUPIVACAINE HYDROCHLORIDE AND EPINEPHRINE 2.5; 5 MG/ML; UG/ML
INJECTION, SOLUTION EPIDURAL; INFILTRATION; INTRACAUDAL; PERINEURAL PRN
Status: DISCONTINUED | OUTPATIENT
Start: 2017-07-14 | End: 2017-07-14 | Stop reason: HOSPADM

## 2017-07-14 RX ORDER — HYDROCODONE BITARTRATE AND ACETAMINOPHEN 5; 325 MG/1; MG/1
2 TABLET ORAL PRN
Status: DISCONTINUED | OUTPATIENT
Start: 2017-07-14 | End: 2017-07-14 | Stop reason: HOSPADM

## 2017-07-14 RX ORDER — HYDROCODONE BITARTRATE AND ACETAMINOPHEN 5; 325 MG/1; MG/1
1 TABLET ORAL PRN
Status: DISCONTINUED | OUTPATIENT
Start: 2017-07-14 | End: 2017-07-14 | Stop reason: HOSPADM

## 2017-07-14 RX ORDER — CIPROFLOXACIN 2 MG/ML
400 INJECTION, SOLUTION INTRAVENOUS ONCE
Status: COMPLETED | OUTPATIENT
Start: 2017-07-14 | End: 2017-07-14

## 2017-07-14 RX ORDER — SODIUM CHLORIDE, SODIUM LACTATE, POTASSIUM CHLORIDE, CALCIUM CHLORIDE 600; 310; 30; 20 MG/100ML; MG/100ML; MG/100ML; MG/100ML
INJECTION, SOLUTION INTRAVENOUS CONTINUOUS
Status: DISCONTINUED | OUTPATIENT
Start: 2017-07-14 | End: 2017-07-14 | Stop reason: HOSPADM

## 2017-07-14 RX ORDER — METOCLOPRAMIDE HYDROCHLORIDE 5 MG/ML
10 INJECTION INTRAMUSCULAR; INTRAVENOUS
Status: DISCONTINUED | OUTPATIENT
Start: 2017-07-14 | End: 2017-07-14 | Stop reason: HOSPADM

## 2017-07-14 RX ORDER — MIDAZOLAM HYDROCHLORIDE 1 MG/ML
INJECTION INTRAMUSCULAR; INTRAVENOUS PRN
Status: DISCONTINUED | OUTPATIENT
Start: 2017-07-14 | End: 2017-07-14 | Stop reason: SDUPTHER

## 2017-07-14 RX ORDER — FENTANYL CITRATE 50 UG/ML
50 INJECTION, SOLUTION INTRAMUSCULAR; INTRAVENOUS EVERY 10 MIN PRN
Status: DISCONTINUED | OUTPATIENT
Start: 2017-07-14 | End: 2017-07-14 | Stop reason: HOSPADM

## 2017-07-14 RX ORDER — ONDANSETRON 2 MG/ML
4 INJECTION INTRAMUSCULAR; INTRAVENOUS
Status: DISCONTINUED | OUTPATIENT
Start: 2017-07-14 | End: 2017-07-14 | Stop reason: HOSPADM

## 2017-07-14 RX ADMIN — FENTANYL CITRATE 50 MCG: 50 INJECTION, SOLUTION INTRAMUSCULAR; INTRAVENOUS at 08:10

## 2017-07-14 RX ADMIN — MIDAZOLAM HYDROCHLORIDE 2 MG: 1 INJECTION, SOLUTION INTRAMUSCULAR; INTRAVENOUS at 07:55

## 2017-07-14 RX ADMIN — PROPOFOL 150 MG: 10 INJECTION, EMULSION INTRAVENOUS at 08:10

## 2017-07-14 RX ADMIN — SUGAMMADEX 200 MG: 100 INJECTION, SOLUTION INTRAVENOUS at 09:46

## 2017-07-14 RX ADMIN — FENTANYL CITRATE 50 MCG: 50 INJECTION, SOLUTION INTRAMUSCULAR; INTRAVENOUS at 09:00

## 2017-07-14 RX ADMIN — FENTANYL CITRATE 50 MCG: 50 INJECTION, SOLUTION INTRAMUSCULAR; INTRAVENOUS at 10:53

## 2017-07-14 RX ADMIN — CIPROFLOXACIN 400 MG: 2 INJECTION, SOLUTION INTRAVENOUS at 08:00

## 2017-07-14 RX ADMIN — ONDANSETRON 4 MG: 2 INJECTION INTRAMUSCULAR; INTRAVENOUS at 09:36

## 2017-07-14 RX ADMIN — SODIUM CHLORIDE, POTASSIUM CHLORIDE, SODIUM LACTATE AND CALCIUM CHLORIDE: 600; 310; 30; 20 INJECTION, SOLUTION INTRAVENOUS at 07:03

## 2017-07-14 RX ADMIN — ROCURONIUM BROMIDE 30 MG: 10 INJECTION INTRAVENOUS at 08:10

## 2017-07-14 RX ADMIN — SODIUM CHLORIDE, POTASSIUM CHLORIDE, SODIUM LACTATE AND CALCIUM CHLORIDE: 600; 310; 30; 20 INJECTION, SOLUTION INTRAVENOUS at 09:05

## 2017-07-14 RX ADMIN — HYDROMORPHONE HYDROCHLORIDE 0.4 MG: 1 INJECTION, SOLUTION INTRAMUSCULAR; INTRAVENOUS; SUBCUTANEOUS at 09:17

## 2017-07-14 ASSESSMENT — PAIN DESCRIPTION - PAIN TYPE: TYPE: SURGICAL PAIN

## 2017-07-14 ASSESSMENT — PAIN SCALES - GENERAL
PAINLEVEL_OUTOF10: 7
PAINLEVEL_OUTOF10: 0
PAINLEVEL_OUTOF10: 7
PAINLEVEL_OUTOF10: 3

## 2017-07-14 ASSESSMENT — PAIN - FUNCTIONAL ASSESSMENT: PAIN_FUNCTIONAL_ASSESSMENT: 0-10

## 2017-07-14 ASSESSMENT — PAIN DESCRIPTION - LOCATION: LOCATION: ABDOMEN

## 2017-07-15 ENCOUNTER — HOSPITAL ENCOUNTER (EMERGENCY)
Age: 57
Discharge: HOME | End: 2017-07-15
Payer: MEDICARE

## 2017-07-15 ENCOUNTER — TELEPHONE (OUTPATIENT)
Dept: FAMILY MEDICINE CLINIC | Age: 57
End: 2017-07-15

## 2017-07-15 VITALS
HEIGHT: 62 IN | OXYGEN SATURATION: 99 % | SYSTOLIC BLOOD PRESSURE: 180 MMHG | HEART RATE: 76 BPM | TEMPERATURE: 97.6 F | DIASTOLIC BLOOD PRESSURE: 93 MMHG | RESPIRATION RATE: 18 BRPM | WEIGHT: 124.25 LBS | BODY MASS INDEX: 22.87 KG/M2

## 2017-07-15 DIAGNOSIS — Z76.0 PRESCRIPTION REFILL: Primary | ICD-10-CM

## 2017-07-15 PROCEDURE — 99282 EMERGENCY DEPT VISIT SF MDM: CPT

## 2017-07-15 ASSESSMENT — PAIN DESCRIPTION - PAIN TYPE: TYPE: SURGICAL PAIN

## 2017-07-15 ASSESSMENT — ENCOUNTER SYMPTOMS
SHORTNESS OF BREATH: 0
ABDOMINAL PAIN: 0
VOMITING: 0
SORE THROAT: 0
DIARRHEA: 0
BACK PAIN: 0
NAUSEA: 0
TROUBLE SWALLOWING: 0
COUGH: 0
PHOTOPHOBIA: 0

## 2017-07-15 ASSESSMENT — PAIN SCALES - GENERAL: PAINLEVEL_OUTOF10: 4

## 2017-07-15 ASSESSMENT — PAIN DESCRIPTION - DESCRIPTORS: DESCRIPTORS: ACHING

## 2017-07-21 ENCOUNTER — TELEPHONE (OUTPATIENT)
Dept: FAMILY MEDICINE CLINIC | Age: 57
End: 2017-07-21

## 2017-08-08 ENCOUNTER — HOSPITAL ENCOUNTER (OUTPATIENT)
Dept: CT IMAGING | Age: 57
Discharge: HOME | End: 2017-08-08
Payer: MEDICARE

## 2017-08-08 DIAGNOSIS — R91.8 MULTIPLE LUNG NODULES: ICD-10-CM

## 2017-08-08 PROCEDURE — 71250 CT THORAX DX C-: CPT

## 2017-08-25 RX ORDER — ALBUTEROL SULFATE 90 UG/1
2 AEROSOL, METERED RESPIRATORY (INHALATION) EVERY 6 HOURS PRN
Qty: 1 INHALER | Refills: 0 | Status: SHIPPED | OUTPATIENT
Start: 2017-08-25 | End: 2017-09-01 | Stop reason: SDUPTHER

## 2017-08-25 RX ORDER — ALBUTEROL SULFATE 2.5 MG/3ML
2.5 SOLUTION RESPIRATORY (INHALATION) EVERY 4 HOURS PRN
Qty: 30 EACH | Refills: 0 | Status: SHIPPED | OUTPATIENT
Start: 2017-08-25 | End: 2017-09-01 | Stop reason: SDUPTHER

## 2017-09-01 ENCOUNTER — OFFICE VISIT (OUTPATIENT)
Dept: FAMILY MEDICINE CLINIC | Age: 57
End: 2017-09-01

## 2017-09-01 VITALS
SYSTOLIC BLOOD PRESSURE: 112 MMHG | HEIGHT: 62 IN | OXYGEN SATURATION: 98 % | WEIGHT: 117 LBS | RESPIRATION RATE: 17 BRPM | DIASTOLIC BLOOD PRESSURE: 66 MMHG | HEART RATE: 57 BPM | BODY MASS INDEX: 21.53 KG/M2 | TEMPERATURE: 98.2 F

## 2017-09-01 DIAGNOSIS — M25.50 MULTIPLE JOINT PAIN: ICD-10-CM

## 2017-09-01 DIAGNOSIS — J44.9 CHRONIC OBSTRUCTIVE PULMONARY DISEASE, UNSPECIFIED COPD TYPE (HCC): Primary | ICD-10-CM

## 2017-09-01 DIAGNOSIS — Z00.00 HEALTH CARE MAINTENANCE: ICD-10-CM

## 2017-09-01 PROCEDURE — 99213 OFFICE O/P EST LOW 20 MIN: CPT | Performed by: FAMILY MEDICINE

## 2017-09-01 RX ORDER — ARFORMOTEROL TARTRATE 15 UG/2ML
1 SOLUTION RESPIRATORY (INHALATION) 2 TIMES DAILY
Qty: 120 ML | Refills: 3 | Status: CANCELLED | OUTPATIENT
Start: 2017-09-01

## 2017-09-01 RX ORDER — ALBUTEROL SULFATE 2.5 MG/3ML
2.5 SOLUTION RESPIRATORY (INHALATION) EVERY 4 HOURS PRN
Qty: 30 EACH | Refills: 1 | Status: SHIPPED | OUTPATIENT
Start: 2017-09-01 | End: 2017-11-02 | Stop reason: SDUPTHER

## 2017-09-01 RX ORDER — ALBUTEROL SULFATE 90 UG/1
2 AEROSOL, METERED RESPIRATORY (INHALATION) EVERY 6 HOURS PRN
Qty: 1 INHALER | Refills: 1 | Status: SHIPPED | OUTPATIENT
Start: 2017-09-01 | End: 2018-05-01

## 2017-09-18 RX ORDER — ARFORMOTEROL TARTRATE 15 UG/2ML
SOLUTION RESPIRATORY (INHALATION)
Qty: 360 ML | Refills: 3 | Status: SHIPPED | OUTPATIENT
Start: 2017-09-18 | End: 2017-09-28 | Stop reason: SDUPTHER

## 2017-09-28 ENCOUNTER — TELEPHONE (OUTPATIENT)
Dept: PULMONOLOGY | Age: 57
End: 2017-09-28

## 2017-09-28 RX ORDER — ARFORMOTEROL TARTRATE 15 UG/2ML
SOLUTION RESPIRATORY (INHALATION)
Qty: 360 ML | Refills: 3 | Status: SHIPPED | OUTPATIENT
Start: 2017-09-28 | End: 2017-11-17 | Stop reason: SDUPTHER

## 2017-09-28 RX ORDER — ARFORMOTEROL TARTRATE 15 UG/2ML
SOLUTION RESPIRATORY (INHALATION)
Qty: 360 ML | Refills: 3 | Status: SHIPPED | OUTPATIENT
Start: 2017-09-28 | End: 2017-09-28 | Stop reason: SDUPTHER

## 2017-10-24 ENCOUNTER — CARE COORDINATION (OUTPATIENT)
Dept: CARE COORDINATION | Age: 57
End: 2017-10-24

## 2017-10-24 NOTE — CARE COORDINATION
Ambulatory Care Coordination Note  10/24/2017  CM Risk Score: 3  Sona Mortality Risk Score:      ACC: Jesus Herrera, RN    Summary Note: Called pt to discuss [address] [employer] program, current ACC needs, and complete enrollment. LVMM requesting a call back to discuss. Ambulatory Care Coordination Assessment    Care Coordination Protocol  Referral from Primary Care Provider:  No  Week 1 - Initial Assessment                             Suggested Interventions and Community Resources                  Prior to Admission medications    Medication Sig Start Date End Date Taking? Authorizing Provider   Arformoterol Tartrate (BROVANA) 15 MCG/2ML NEBU use 1 vial in nebulizer twice a day DX COPD J44.9 9/28/17   Gilmar Martinez MD   POTASSIUM PO Take by mouth    Historical Provider, MD   albuterol sulfate  (90 Base) MCG/ACT inhaler Inhale 2 puffs into the lungs every 6 hours as needed for Wheezing 9/1/17   Margo Caban MD   ipratropium (ATROVENT) 0.02 % nebulizer solution Take 2.5 mLs by nebulization 4 times daily 9/1/17 11/30/17  Margo Caban MD   albuterol (PROVENTIL) (2.5 MG/3ML) 0.083% nebulizer solution Take 3 mLs by nebulization every 4 hours as needed for Wheezing 9/1/17   Margo Caban MD   vitamin E 1000 units capsule Take 1,000 Units by mouth daily    Historical Provider, MD   calcium carbonate (OSCAL) 500 MG TABS tablet Take 500 mg by mouth daily    Historical Provider, MD   magnesium 30 MG tablet Take 30 mg by mouth daily    Historical Provider, MD   vitamin B-12 (CYANOCOBALAMIN) 100 MCG tablet Take 50 mcg by mouth daily    Historical Provider, MD   oxyCODONE-acetaminophen (PERCOCET) 5-325 MG per tablet Take 1-2 tabs po q 6 h prn pain.  7/5/17   Milagro Reese MD   Tiotropium Bromide-Olodaterol (STIOLTO RESPIMAT) 2.5-2.5 MCG/ACT AERS Inhale 2 puffs into the lungs daily     Historical Provider, MD       Future Appointments  Date Time Provider Mishel Huang   11/2/2017 10:30 AM Chencho Corcoran MD [facility]

## 2017-11-02 ENCOUNTER — OFFICE VISIT (OUTPATIENT)
Dept: PULMONOLOGY | Age: 57
End: 2017-11-02

## 2017-11-02 VITALS
TEMPERATURE: 97.8 F | BODY MASS INDEX: 23.63 KG/M2 | SYSTOLIC BLOOD PRESSURE: 130 MMHG | OXYGEN SATURATION: 98 % | WEIGHT: 128.4 LBS | HEART RATE: 74 BPM | HEIGHT: 62 IN | DIASTOLIC BLOOD PRESSURE: 82 MMHG

## 2017-11-02 DIAGNOSIS — J44.9 CHRONIC OBSTRUCTIVE PULMONARY DISEASE, UNSPECIFIED COPD TYPE (HCC): Primary | ICD-10-CM

## 2017-11-02 DIAGNOSIS — R91.1 LUNG NODULE: ICD-10-CM

## 2017-11-02 DIAGNOSIS — J44.9 CHRONIC OBSTRUCTIVE PULMONARY DISEASE, UNSPECIFIED COPD TYPE (HCC): ICD-10-CM

## 2017-11-02 PROCEDURE — G8420 CALC BMI NORM PARAMETERS: HCPCS | Performed by: INTERNAL MEDICINE

## 2017-11-02 PROCEDURE — G8482 FLU IMMUNIZE ORDER/ADMIN: HCPCS | Performed by: INTERNAL MEDICINE

## 2017-11-02 PROCEDURE — 3014F SCREEN MAMMO DOC REV: CPT | Performed by: INTERNAL MEDICINE

## 2017-11-02 PROCEDURE — 3023F SPIROM DOC REV: CPT | Performed by: INTERNAL MEDICINE

## 2017-11-02 PROCEDURE — G8926 SPIRO NO PERF OR DOC: HCPCS | Performed by: INTERNAL MEDICINE

## 2017-11-02 PROCEDURE — 99214 OFFICE O/P EST MOD 30 MIN: CPT | Performed by: INTERNAL MEDICINE

## 2017-11-02 PROCEDURE — 3017F COLORECTAL CA SCREEN DOC REV: CPT | Performed by: INTERNAL MEDICINE

## 2017-11-02 PROCEDURE — G8427 DOCREV CUR MEDS BY ELIG CLIN: HCPCS | Performed by: INTERNAL MEDICINE

## 2017-11-02 PROCEDURE — 1036F TOBACCO NON-USER: CPT | Performed by: INTERNAL MEDICINE

## 2017-11-02 RX ORDER — ALBUTEROL SULFATE 2.5 MG/3ML
2.5 SOLUTION RESPIRATORY (INHALATION) EVERY 4 HOURS PRN
Qty: 120 EACH | Refills: 5 | OUTPATIENT
Start: 2017-11-02 | End: 2017-11-02 | Stop reason: SDUPTHER

## 2017-11-02 RX ORDER — ALBUTEROL SULFATE 2.5 MG/3ML
2.5 SOLUTION RESPIRATORY (INHALATION) EVERY 4 HOURS PRN
Qty: 120 EACH | Refills: 5 | Status: SHIPPED | OUTPATIENT
Start: 2017-11-02 | End: 2019-01-22 | Stop reason: SDUPTHER

## 2017-11-02 ASSESSMENT — ENCOUNTER SYMPTOMS
TROUBLE SWALLOWING: 0
WHEEZING: 1
SINUS PRESSURE: 0
COUGH: 1
RHINORRHEA: 0
NAUSEA: 0
SHORTNESS OF BREATH: 1
EYE ITCHING: 0
VOICE CHANGE: 0
VOMITING: 0
DIARRHEA: 0
EYE DISCHARGE: 0
SORE THROAT: 0
CHEST TIGHTNESS: 0
ABDOMINAL PAIN: 0

## 2017-11-02 NOTE — PROGRESS NOTES
Subjective:     Syeda Sabillon is a 59 y.o. female who complains today of:     Chief Complaint   Patient presents with    Follow-up     copd       HPI   Patient is using Brovana nebulizer twice a day and nebulizer with albuterol and ipratropium 4 times a day when necessary  C/o shortness of breath , worse with exertion. Occasional Wheezing   No Cough with  Sputum  No Hemoptysis  No Chest pain or pleuritic pain  No Fever or chills. She had CT chest done in August 2017      Allergies:  Pcn [penicillins]; Corticosteroids; Lidocaine; Naprosyn [naproxen]; Contrast [iodides]; and Valium [diazepam]  Past Medical History:   Diagnosis Date    Acute respiratory failure ([address]     Arthritis     general joints    Cataracts, bilateral 05/03/2017    COPD (chronic obstructive pulmonary disease) (HCC)     Fracture of coccyx (HCC)     History of non-ST elevation myocardial infarction (NSTEMI) 3/17/2016    History of tobacco abuse     past smoker / quit > 2 yrs    HTN (hypertension)     past trx    Tendinitis of wrist     BIALTERAL     Past Surgical History:   Procedure Laterality Date    BREAST SURGERY      bilateral implants    CARDIAC CATHETERIZATION  3/2016    CATARACT REMOVAL Left 10/12/15     DR. SANTIAGO    CATARACT REMOVAL WITH IMPLANT Right 11/2/15      [facility]    CHOLECYSTECTOMY, LAPAROSCOPIC N/A 7/14/2017    CHOLECYSTECTOMY LAPAROSCOPIC - CHOLANGIOGRAM POSS OPEN performed by Najma García MD at [address] [place] [address]  2005    EYE SURGERY      Phaco with IOL OU    TUBAL LIGATION       Family History   Problem Relation Age of Onset    Heart Disease Father     High Blood Pressure Father      Social History     Social History    Marital status: Single     Spouse name: N/A    Number of children: N/A    Years of education: N/A     Occupational History    Not on file.      Social History Main Topics    Smoking status: Former Smoker     Packs/day: 0.50     Years: 30.00 Quit date: 12/4/2015    Smokeless tobacco: Never Used    Alcohol use No    Drug use: No    Sexual activity: Not on file     Other Topics Concern    Not on file     Social History Narrative    No narrative on file         Review of Systems   Constitutional: Negative for chills, diaphoresis, fatigue and fever. HENT: Negative for congestion, mouth sores, nosebleeds, postnasal drip, rhinorrhea, sinus pressure, sneezing, sore throat, trouble swallowing and voice change. Eyes: Negative for discharge, itching and visual disturbance. Respiratory: Positive for cough, shortness of breath and wheezing. Negative for chest tightness. Cardiovascular: Negative for chest pain, palpitations and leg swelling. Gastrointestinal: Negative for abdominal pain, diarrhea, nausea and vomiting. Genitourinary: Negative for difficulty urinating and hematuria. Musculoskeletal: Negative for arthralgias, joint swelling and myalgias. Skin: Negative for rash. Allergic/Immunologic: Negative for environmental allergies. Neurological: Negative for dizziness, tremors, weakness and headaches. Psychiatric/Behavioral: Negative for behavioral problems and sleep disturbance. Objective:     Vitals:    11/02/17 1023   BP: 130/82   Site: Right Arm   Position: Sitting   Cuff Size: Medium Adult   Pulse: 74   Temp: 97.8 °F (36.6 °C)   TempSrc: Temporal   SpO2: 98%   Weight: 128 lb 6.4 oz (58.2 kg)   Height: 5' 2\" (1.575 m)         Physical Exam   Constitutional: She is oriented to person, place, and time. She appears well-developed and well-nourished. No distress. HENT:   Head: Normocephalic and atraumatic. Nose: Nose normal.   Mouth/Throat: Oropharynx is clear and moist.   Eyes: EOM are normal. Pupils are equal, round, and reactive to light. Neck: No JVD present. No tracheal deviation present. No thyromegaly present. Cardiovascular: Normal rate and regular rhythm. Exam reveals no gallop and no friction rub.     No bony structures, or visualized upper abdomen. Impression STABLE CHEST CT. THE ONLY AREA OF OPACITY THAT I WOULD RECOMMEND FOLLOW-UP IS THE SMALL PATCH OF GROUNDGLASS OPACITY IN THE RIGHT APEX. CONSIDER ONE-YEAR FOLLOW-UP CT FOR THREE MORE CYCLES. Assessment/Plan:     1. Lung nodule  Patient had CT chest done which shows. Opacity in the right apex. Recommend follow-up in one year. Follow up CT chest requested  - CT CHEST WO CONTRAST; Future    2. Chronic obstructive pulmonary disease, unspecified COPD type ([address]  Continue bronchodilator as before. She is on Brovana, and albuterol and ipratropium nebulizer. Refill done    - albuterol (PROVENTIL) (2.5 MG/3ML) 0.083% nebulizer solution; Take 3 mLs by nebulization every 4 hours as needed for Wheezing  Dispense: 120 each; Refill: 5  - ipratropium (ATROVENT) 0.02 % nebulizer solution; Take 2.5 mLs by nebulization 4 times daily  Dispense: 120 mL; Refill: 5    Refill done    Return in about 6 months (around 5/2/2018) for COPD, lung nodule f/u.       Sharif Velarde MD

## 2017-11-10 ENCOUNTER — CARE COORDINATION (OUTPATIENT)
Dept: FAMILY MEDICINE CLINIC | Age: 57
End: 2017-11-10

## 2017-11-10 NOTE — LETTER
11/10/2017            Js Cavanaugh  [address] [place] [facility] [address]    I am following up on my previous contact. I wanted to introduce myself and the care coordination program offered here at Rebeca Trevino MD's office. Our goal is to support you in your efforts to be as healthy as possible. [facility] is committed to walk with you on your journey to better health. Please let me know if you have any questions or if you would like to schedule an appointment with me. You can reach me at the numbers listed below.      In good health,       Surjit Knapp, [address]  [phone]

## 2017-11-10 NOTE — CARE COORDINATION
Ambulatory Care Coordination Note  11/10/2017  CM Risk Score: 3  Sona Mortality Risk Score:      ACC: Louise Kirkpatrick RN    Summary Note: Called pt to follow up on initial [address] intro call. LVMM requesting a call back to discuss. Will mail out Pt education. ACC f/u letter in the meantime. Ambulatory Care Coordination Assessment    Care Coordination Protocol  Referral from Primary Care Provider:  No  Week 1 - Initial Assessment                             Suggested Interventions and Community Resources                  Prior to Admission medications    Medication Sig Start Date End Date Taking? Authorizing Provider   albuterol (PROVENTIL) (2.5 MG/3ML) 0.083% nebulizer solution Take 3 mLs by nebulization every 4 hours as needed for Wheezing 11/2/17   Iris Gonsalves MD   ipratropium (ATROVENT) 0.02 % nebulizer solution Take 2.5 mLs by nebulization 4 times daily 11/2/17 1/31/18  Iirs Gonsalves MD   Arformoterol Tartrate (BROVANA) 15 MCG/2ML NEBU use 1 vial in nebulizer twice a day DX COPD J44.9 9/28/17   Petty Rios MD   POTASSIUM PO Take by mouth    Historical Provider, MD   albuterol sulfate  (90 Base) MCG/ACT inhaler Inhale 2 puffs into the lungs every 6 hours as needed for Wheezing 9/1/17   Genevieve Miller MD   vitamin E 1000 units capsule Take 1,000 Units by mouth daily    Historical Provider, MD   calcium carbonate (OSCAL) 500 MG TABS tablet Take 500 mg by mouth daily    Historical Provider, MD   magnesium 30 MG tablet Take 30 mg by mouth daily    Historical Provider, MD   vitamin B-12 (CYANOCOBALAMIN) 100 MCG tablet Take 50 mcg by mouth daily    Historical Provider, MD   oxyCODONE-acetaminophen (PERCOCET) 5-325 MG per tablet Take 1-2 tabs po q 6 h prn pain.  7/5/17   Alejandra Can MD       Future Appointments  Date Time Provider Mishel Huang   5/1/2018 9:00 AM Iris Gonsalves, [address] [facility]   8/9/2018 8:00 AM [facility] ROOM [facility]

## 2017-11-17 ENCOUNTER — TELEPHONE (OUTPATIENT)
Dept: PULMONOLOGY | Age: 57
End: 2017-11-17

## 2017-11-17 DIAGNOSIS — J44.9 CHRONIC OBSTRUCTIVE PULMONARY DISEASE, UNSPECIFIED COPD TYPE (HCC): Primary | ICD-10-CM

## 2017-11-17 RX ORDER — ARFORMOTEROL TARTRATE 15 UG/2ML
SOLUTION RESPIRATORY (INHALATION)
Qty: 360 ML | Refills: 3 | Status: SHIPPED | OUTPATIENT
Start: 2017-11-17 | End: 2017-12-05 | Stop reason: SDUPTHER

## 2017-11-17 NOTE — TELEPHONE ENCOUNTER
I called the [phone] number she gave me. Which is for procedures to get PA. I could not reach Provider services. I have already called [facility] twice, requested a tier exception form and received a PA form, I filled it out and faxed it back. I offered to send brovana to [facility] for home to get it cheaper. I can not keep wasting time.

## 2017-12-05 ENCOUNTER — TELEPHONE (OUTPATIENT)
Dept: PULMONOLOGY | Age: 57
End: 2017-12-05

## 2017-12-05 DIAGNOSIS — J44.9 CHRONIC OBSTRUCTIVE PULMONARY DISEASE, UNSPECIFIED COPD TYPE (HCC): ICD-10-CM

## 2017-12-05 RX ORDER — ARFORMOTEROL TARTRATE 15 UG/2ML
SOLUTION RESPIRATORY (INHALATION)
Qty: 360 ML | Refills: 3 | Status: SHIPPED | OUTPATIENT
Start: 2017-12-05 | End: 2018-07-03 | Stop reason: SDUPTHER

## 2017-12-05 NOTE — TELEPHONE ENCOUNTER
Needs a script sent to [address] [place] [address] for Hiwot Dewitt and wants it for a 90 day supply per insurance. Pt also wants a mini compressor nebulizer sent to [address] [place] [address] also. Please call pt when sent.

## 2018-01-10 ENCOUNTER — CARE COORDINATION (OUTPATIENT)
Dept: CARE COORDINATION | Age: 58
End: 2018-01-10

## 2018-01-10 NOTE — LETTER
1/16/2018      Brady Baker  [address] [place] [address] [facility] [address]         You have been selected by your primary care provider to participate in a Care Coordination Program that provides additional support and resources to provide a higher level of care to our patients. One of those resources is a Nurse Care Coordinator, Yris Dawson who can offer support in managing the health of our patients who have chronic health conditions, multiple health conditions, and or complex health needs. Examples of the types of support Yris Dawson RN will provide include service coordination (finding providers in your network and community, assistance in scheduling services, ensuring test results are available, etc), education, and promoting your ability to follow your doctor's recommended treatment plan. You have been selected to take part in this unique program that will include one on one interaction with Yris Dawson RN. Yris Dawson RN will work with you following some of your appointments with me in our office. She/He will also contact you via phone to help you learn and understand how to manage your health and work towards your chosen health goals. I hope that you will be as excited by this program as we are. Yris Dawson RN will be contacting you in the next week to speak with you regarding your plan of care.     Sincerely,     Kristopher Adams MD

## 2018-02-19 ENCOUNTER — CARE COORDINATION (OUTPATIENT)
Dept: FAMILY MEDICINE CLINIC | Age: 58
End: 2018-02-19

## 2018-02-20 NOTE — CARE COORDINATION
Ambulatory Care Coordination Note  2/20/2018  CM Risk Score: 2  Sona Mortality Risk Score:      ACC: Leticia Adam RN    Summary Note: Called pt to follow up on progress, reinforce previous/ provide pt educations, and discuss any new issues or concerns. LVMM requesting a call back to discuss. Care Coordination Interventions    Referral from Primary Care Provider:  No  Suggested Interventions and Community Resources         Goals Addressed     None          Prior to Admission medications    Medication Sig Start Date End Date Taking? Authorizing Provider   Arformoterol Tartrate (BROVANA) 15 MCG/2ML NEBU use 1 vial in nebulizer twice a day DX COPD J44.9 12/5/17   Stephanie Estrada MD   Nebulizers (AIRIAL COMPACT MINI NEBULIZER) [address] Mini nebulizer 12/5/17   Stephanie Estrada MD   albuterol (PROVENTIL) (2.5 MG/3ML) 0.083% nebulizer solution Take 3 mLs by nebulization every 4 hours as needed for Wheezing 11/2/17   Stephanie Estrada MD   ipratropium (ATROVENT) 0.02 % nebulizer solution Take 2.5 mLs by nebulization 4 times daily 11/2/17 1/31/18  Stephanie Estrada MD   POTASSIUM PO Take by mouth    Historical Provider, MD   albuterol sulfate  (90 Base) MCG/ACT inhaler Inhale 2 puffs into the lungs every 6 hours as needed for Wheezing 9/1/17   Amilcar Rehman MD   vitamin E 1000 units capsule Take 1,000 Units by mouth daily    Historical Provider, MD   calcium carbonate (OSCAL) 500 MG TABS tablet Take 500 mg by mouth daily    Historical Provider, MD   magnesium 30 MG tablet Take 30 mg by mouth daily    Historical Provider, MD   vitamin B-12 (CYANOCOBALAMIN) 100 MCG tablet Take 50 mcg by mouth daily    Historical Provider, MD   oxyCODONE-acetaminophen (PERCOCET) 5-325 MG per tablet Take 1-2 tabs po q 6 h prn pain.  7/5/17   Alexis Blood MD       Future Appointments  Date Time Provider Mishel Huang   5/1/2018 9:00 AM Stephanie Estrada MD [address]   8/9/2018 8:00 AM [facility] ROOM 1  [address] [facility]

## 2018-03-06 ENCOUNTER — TELEPHONE (OUTPATIENT)
Dept: PULMONOLOGY | Age: 58
End: 2018-03-06

## 2018-04-04 ENCOUNTER — CARE COORDINATION (OUTPATIENT)
Dept: FAMILY MEDICINE CLINIC | Age: 58
End: 2018-04-04

## 2018-05-01 ENCOUNTER — OFFICE VISIT (OUTPATIENT)
Dept: PULMONOLOGY | Age: 58
End: 2018-05-01
Payer: MEDICARE

## 2018-05-01 VITALS
WEIGHT: 130.2 LBS | SYSTOLIC BLOOD PRESSURE: 130 MMHG | BODY MASS INDEX: 23.96 KG/M2 | DIASTOLIC BLOOD PRESSURE: 68 MMHG | TEMPERATURE: 97.9 F | RESPIRATION RATE: 16 BRPM | HEART RATE: 55 BPM | OXYGEN SATURATION: 97 % | HEIGHT: 62 IN

## 2018-05-01 DIAGNOSIS — R91.1 LUNG NODULE: ICD-10-CM

## 2018-05-01 DIAGNOSIS — J44.9 CHRONIC OBSTRUCTIVE PULMONARY DISEASE, UNSPECIFIED COPD TYPE (HCC): Primary | ICD-10-CM

## 2018-05-01 PROCEDURE — 99213 OFFICE O/P EST LOW 20 MIN: CPT | Performed by: INTERNAL MEDICINE

## 2018-05-01 PROCEDURE — 3017F COLORECTAL CA SCREEN DOC REV: CPT | Performed by: INTERNAL MEDICINE

## 2018-05-01 PROCEDURE — G8420 CALC BMI NORM PARAMETERS: HCPCS | Performed by: INTERNAL MEDICINE

## 2018-05-01 PROCEDURE — 1036F TOBACCO NON-USER: CPT | Performed by: INTERNAL MEDICINE

## 2018-05-01 PROCEDURE — 3023F SPIROM DOC REV: CPT | Performed by: INTERNAL MEDICINE

## 2018-05-01 PROCEDURE — G8926 SPIRO NO PERF OR DOC: HCPCS | Performed by: INTERNAL MEDICINE

## 2018-05-01 PROCEDURE — G8427 DOCREV CUR MEDS BY ELIG CLIN: HCPCS | Performed by: INTERNAL MEDICINE

## 2018-05-01 RX ORDER — ALBUTEROL SULFATE 90 UG/1
2 AEROSOL, METERED RESPIRATORY (INHALATION) EVERY 6 HOURS PRN
Qty: 1 INHALER | Refills: 3 | Status: SHIPPED | OUTPATIENT
Start: 2018-05-01 | End: 2019-06-21 | Stop reason: SDUPTHER

## 2018-05-01 ASSESSMENT — ENCOUNTER SYMPTOMS
SORE THROAT: 0
NAUSEA: 0
EYE ITCHING: 0
SHORTNESS OF BREATH: 1
DIARRHEA: 0
VOICE CHANGE: 0
TROUBLE SWALLOWING: 0
WHEEZING: 1
SINUS PRESSURE: 0
CHOKING: 0
VOMITING: 0
EYE DISCHARGE: 0
RHINORRHEA: 0
COUGH: 0
CHEST TIGHTNESS: 1
ABDOMINAL PAIN: 0

## 2018-05-16 ENCOUNTER — TELEPHONE (OUTPATIENT)
Dept: PULMONOLOGY | Age: 58
End: 2018-05-16

## 2018-05-24 ENCOUNTER — TELEPHONE (OUTPATIENT)
Dept: PULMONOLOGY | Age: 58
End: 2018-05-24

## 2018-07-03 ENCOUNTER — TELEPHONE (OUTPATIENT)
Dept: PULMONOLOGY | Age: 58
End: 2018-07-03

## 2018-07-03 DIAGNOSIS — J44.9 CHRONIC OBSTRUCTIVE PULMONARY DISEASE, UNSPECIFIED COPD TYPE (HCC): ICD-10-CM

## 2018-07-03 RX ORDER — ARFORMOTEROL TARTRATE 15 UG/2ML
SOLUTION RESPIRATORY (INHALATION)
Qty: 360 ML | Refills: 3 | Status: SHIPPED | OUTPATIENT
Start: 2018-07-03 | End: 2020-05-26 | Stop reason: SDUPTHER

## 2018-07-03 NOTE — TELEPHONE ENCOUNTER
Pt would like a prescription for Brovana to go to [place]. Please fax over the script to [place]. [facility] will pay for it that way.

## 2018-07-06 ENCOUNTER — TELEPHONE (OUTPATIENT)
Dept: GASTROENTEROLOGY | Age: 58
End: 2018-07-06

## 2018-08-03 ENCOUNTER — TELEPHONE (OUTPATIENT)
Dept: PULMONOLOGY | Age: 58
End: 2018-08-03

## 2018-08-03 DIAGNOSIS — J44.9 CHRONIC OBSTRUCTIVE PULMONARY DISEASE, UNSPECIFIED COPD TYPE (HCC): Primary | ICD-10-CM

## 2018-08-03 RX ORDER — NEBULIZER ACCESSORIES
EACH MISCELLANEOUS
Qty: 1 EACH | Refills: 0 | Status: SHIPPED | OUTPATIENT
Start: 2018-08-03 | End: 2019-03-29 | Stop reason: SDUPTHER

## 2018-08-03 NOTE — TELEPHONE ENCOUNTER
TRIED TO CALL PATIENT ON BOTH NUMBERS LISTED IN CHART - NO ANSWER.  SCRIPT IS PRINTED AND READY FOR PICKUP

## 2018-08-09 ENCOUNTER — HOSPITAL ENCOUNTER (OUTPATIENT)
Dept: CT IMAGING | Age: 58
Discharge: HOME | End: 2018-08-11
Payer: MEDICARE

## 2018-08-09 DIAGNOSIS — R91.1 LUNG NODULE: ICD-10-CM

## 2018-08-16 ENCOUNTER — HOSPITAL ENCOUNTER (OUTPATIENT)
Dept: CT IMAGING | Age: 58
Discharge: HOME | End: 2018-08-18
Payer: MEDICARE

## 2018-08-16 PROCEDURE — 71250 CT THORAX DX C-: CPT

## 2018-09-11 ENCOUNTER — OFFICE VISIT (OUTPATIENT)
Dept: PULMONOLOGY | Age: 58
End: 2018-09-11
Payer: MEDICARE

## 2018-09-11 VITALS
OXYGEN SATURATION: 97 % | WEIGHT: 123 LBS | DIASTOLIC BLOOD PRESSURE: 62 MMHG | RESPIRATION RATE: 16 BRPM | SYSTOLIC BLOOD PRESSURE: 112 MMHG | TEMPERATURE: 98.5 F | BODY MASS INDEX: 22.63 KG/M2 | HEART RATE: 59 BPM | HEIGHT: 62 IN

## 2018-09-11 DIAGNOSIS — J44.9 CHRONIC OBSTRUCTIVE PULMONARY DISEASE, UNSPECIFIED COPD TYPE (HCC): Primary | ICD-10-CM

## 2018-09-11 DIAGNOSIS — R91.1 LUNG NODULE: ICD-10-CM

## 2018-09-11 PROCEDURE — G8420 CALC BMI NORM PARAMETERS: HCPCS | Performed by: INTERNAL MEDICINE

## 2018-09-11 PROCEDURE — 1036F TOBACCO NON-USER: CPT | Performed by: INTERNAL MEDICINE

## 2018-09-11 PROCEDURE — G8926 SPIRO NO PERF OR DOC: HCPCS | Performed by: INTERNAL MEDICINE

## 2018-09-11 PROCEDURE — 3017F COLORECTAL CA SCREEN DOC REV: CPT | Performed by: INTERNAL MEDICINE

## 2018-09-11 PROCEDURE — 3023F SPIROM DOC REV: CPT | Performed by: INTERNAL MEDICINE

## 2018-09-11 PROCEDURE — G8427 DOCREV CUR MEDS BY ELIG CLIN: HCPCS | Performed by: INTERNAL MEDICINE

## 2018-09-11 PROCEDURE — 99214 OFFICE O/P EST MOD 30 MIN: CPT | Performed by: INTERNAL MEDICINE

## 2018-09-11 RX ORDER — NEBULIZER ACCESSORIES
EACH MISCELLANEOUS
Qty: 1 EACH | Refills: 0 | Status: SHIPPED | OUTPATIENT
Start: 2018-09-11 | End: 2021-12-21 | Stop reason: SDUPTHER

## 2018-09-11 ASSESSMENT — ENCOUNTER SYMPTOMS
NAUSEA: 0
WHEEZING: 1
DIARRHEA: 0
RHINORRHEA: 0
EYE DISCHARGE: 0
ABDOMINAL PAIN: 0
EYE ITCHING: 0
VOMITING: 0
SORE THROAT: 0
SHORTNESS OF BREATH: 1
VOICE CHANGE: 0
SINUS PRESSURE: 0
COUGH: 0
CHEST TIGHTNESS: 0
TROUBLE SWALLOWING: 0

## 2018-09-11 NOTE — PROGRESS NOTES
Subjective:     Dee Cheema is a 59 y.o. female who complains today of:     Chief Complaint   Patient presents with    Follow-up     four month f/u for CT results. HPI  Patient has CT chest done for f/u 8/16/18. Eulogio Pizano She has CT chest reported subtle increase in size in the area of groundglass opacity right upper lobe from 10 mm to 13 mm as compared with July 2016. Patient is using Brovana nebulizer twice a day and nebulizer with albuterol and ipratropium 4 times a day when necessary and proair HFA 2 puffs when necessary  She has a short of breath with exertion, off and on wheezing. No cough or sputum. No hemoptysis. No fever or chill. No rhinorrhea or postnasal drip. Allergies:  Pcn [penicillins]; Corticosteroids; Lidocaine; Naprosyn [naproxen]; Contrast [iodides]; and Valium [diazepam]  Past Medical History:   Diagnosis Date    Acute respiratory failure ([address]     Arthritis     general joints    Cataracts, bilateral 05/03/2017    COPD (chronic obstructive pulmonary disease) (HCC)     Fracture of coccyx (HCC)     History of non-ST elevation myocardial infarction (NSTEMI) 3/17/2016    History of tobacco abuse     past smoker / quit > 2 yrs    HTN (hypertension)     past trx    Lung disease     Tendinitis of wrist     BIALTERAL     Past Surgical History:   Procedure Laterality Date    BREAST SURGERY      bilateral implants    CARDIAC CATHETERIZATION  3/2016    CATARACT REMOVAL Left 10/12/15     DR. SANTIAGO    CATARACT REMOVAL WITH IMPLANT Right 11/2/15       [facility]    CHOLECYSTECTOMY, LAPAROSCOPIC N/A 7/14/2017    CHOLECYSTECTOMY LAPAROSCOPIC - CHOLANGIOGRAM POSS OPEN performed by Beth Shahid MD at [address] [place] [address]  2005    EYE SURGERY      Phaco with IOL OU    TUBAL LIGATION       Family History   Problem Relation Age of Onset    Heart Disease Father     High Blood Pressure Father      Social History     Social History    Marital status: Single Spouse name: N/A    Number of children: N/A    Years of education: N/A     Occupational History    Not on file. Social History Main Topics    Smoking status: Former Smoker     Packs/day: 0.50     Years: 30.00     Quit date: 12/4/2015    Smokeless tobacco: Never Used    Alcohol use No    Drug use: No    Sexual activity: Not on file     Other Topics Concern    Not on file     Social History Narrative    No narrative on file         Review of Systems   Constitutional: Negative for chills, diaphoresis, fatigue and fever. HENT: Negative for congestion, mouth sores, nosebleeds, postnasal drip, rhinorrhea, sinus pressure, sneezing, sore throat, trouble swallowing and voice change. Eyes: Negative for discharge, itching and visual disturbance. Respiratory: Positive for shortness of breath and wheezing. Negative for cough and chest tightness. Cardiovascular: Negative for chest pain, palpitations and leg swelling. Gastrointestinal: Negative for abdominal pain, diarrhea, nausea and vomiting. Genitourinary: Negative for difficulty urinating and hematuria. Musculoskeletal: Negative for arthralgias, joint swelling and myalgias. Skin: Negative for rash. Allergic/Immunologic: Negative for environmental allergies and food allergies. Neurological: Negative for dizziness, tremors, weakness and headaches. Psychiatric/Behavioral: Negative for behavioral problems and sleep disturbance. Objective:     Vitals:    09/11/18 0942   BP: 112/62   Pulse: 59   Resp: 16   Temp: 98.5 °F (36.9 °C)   TempSrc: Tympanic   SpO2: 97%   Weight: 123 lb (55.8 kg)   Height: 5' 2\" (1.575 m)         Physical Exam   Constitutional: She is oriented to person, place, and time. She appears well-developed and well-nourished. No distress. HENT:   Head: Normocephalic and atraumatic. Nose: Nose normal.   Mouth/Throat: Oropharynx is clear and moist.   Eyes: Pupils are equal, round, and reactive to light.  EOM are reconstruction, and/or weight based dosing when appropriate to reduce radiation dose to as low as reasonably   achievable. FINDINGS:    Lungs: There is a persistent patch of groundglass opacity in the right upper lobe measuring approximately 1.3 cm in maximum diameter. When compared to the study from July 7, 2016, there has been some subtle increase in size suggested. On the previous CT   from July 7, 2016, this groundglass opacity measured less than 10 mm. No definite developing solid component is seen. There are some vessels traversing the posterior aspect of this groundglass opacity, but no clear solid elements are seen. There is no   pleural reaction. The subtle increase in size of this lesion is nonetheless concerning. Management of these groundglass opacities is challenging. The concern is for possible early minimally invasive adenocarcinoma which can present in this fashion. Doubling times of these lesions can often exceed 2 years and PET scan study of these particular nodules is often unrewarding, with significant false-negative and false-positive results. Suspicious features such as increasing density bronchograms or   pleural reaction are not demonstrated which is reassuring, nonetheless the subtle increase in size is concerning. The possibility of an inflammatory reaction is not excluded. Small 3 mm nodular opacity adjacent to the left major fissure most consistent with a benign linear scar or lymphoid tissue. Mediastinum: Heart, mediastinum and marivel are unremarkable. Pleura: Normal. No effusion or thickening. Vessels: Thoracic aorta is intact. Bones: Normal.    Upper abdomen: Visualized portions of the upper abdomen show no significant or acute finding. Impression SUBTLE INCREASE IN SIZE OF AREA OF GROUNDGLASS OPACITY IN THE RIGHT UPPER LOBE. OPACITY MEASURES APPROXIMATELY 1.3 CM IN SIZE, COMPARED TO 10 MM IN JULY OF 2016.  MANAGEMENT OF THESE LESIONS IS DIFFICULT, AS DISCUSSED ABOVE. IT SHOULD BE NOTED   THAT IF MALIGNANCY IS STRONGLY SUSPECTED CLINICALLY, SURGICAL REMOVAL OF THE LESION IS RECOMMENDED. BIOPSY OF THESE LESIONS IN THIS SIZE RANGE ARE ABOUT 50% SUCCESSFUL. IF NO TISSUE SAMPLING IS PERFORMED, THEN CONSIDER 3-6 MONTH FOLLOW-UP CT. IT SHOULD   BE NOTED THAT CT IS THE PRIMARY MODALITY FOR ASSESSMENT OF THESE LESIONS. Assessment/Plan:     1. Chronic obstructive pulmonary disease, unspecified COPD type ([address]  Patient is currently on Brovana nebulizer twice a day and nebulizer with albuterol and ipratropium 4 times a day when necessary and ProAir HFA 2 puffs 4 times a day when necessary, she is having short of breath with exertion and off-and-on wheezing which is chronic. Continue present treatment plan. 2. Lung nodule  Patient had CT chest shows groundglass opacity in right upper lobe increased from 10 mm  In July 2016  which increase to 13 mm in current CT chest. Patient does not want f/u CT chest in 3 month but she will go CT chest in 6 month , even she is ware malignancy can not be r/o . She also does not want to have biopsy or surgical resection at this time.  - CT CHEST WO CONTRAST; Future      Return in about 6 months (around 3/11/2019) for COPD, lung nodule f/u.        Huma Engel MD

## 2019-01-18 DIAGNOSIS — J44.9 CHRONIC OBSTRUCTIVE PULMONARY DISEASE, UNSPECIFIED COPD TYPE (HCC): ICD-10-CM

## 2019-01-18 RX ORDER — ALBUTEROL SULFATE 2.5 MG/3ML
SOLUTION RESPIRATORY (INHALATION)
Refills: 7 | Status: CANCELLED | OUTPATIENT
Start: 2019-01-18

## 2019-01-22 DIAGNOSIS — J44.9 CHRONIC OBSTRUCTIVE PULMONARY DISEASE, UNSPECIFIED COPD TYPE (HCC): ICD-10-CM

## 2019-01-22 RX ORDER — ALBUTEROL SULFATE 2.5 MG/3ML
2.5 SOLUTION RESPIRATORY (INHALATION) EVERY 4 HOURS PRN
Qty: 120 EACH | Refills: 5 | Status: SHIPPED | OUTPATIENT
Start: 2019-01-22 | End: 2019-01-28 | Stop reason: SDUPTHER

## 2019-01-28 DIAGNOSIS — J44.9 CHRONIC OBSTRUCTIVE PULMONARY DISEASE, UNSPECIFIED COPD TYPE (HCC): ICD-10-CM

## 2019-01-28 RX ORDER — ALBUTEROL SULFATE 2.5 MG/3ML
2.5 SOLUTION RESPIRATORY (INHALATION) EVERY 4 HOURS PRN
Qty: 120 EACH | Refills: 5 | Status: SHIPPED | OUTPATIENT
Start: 2019-01-28 | End: 2020-08-13

## 2019-02-19 ENCOUNTER — TELEPHONE (OUTPATIENT)
Dept: FAMILY MEDICINE CLINIC | Age: 59
End: 2019-02-19

## 2019-02-20 DIAGNOSIS — J44.9 CHRONIC OBSTRUCTIVE PULMONARY DISEASE, UNSPECIFIED COPD TYPE (HCC): ICD-10-CM

## 2019-02-22 RX ORDER — ARFORMOTEROL TARTRATE 15 UG/2ML
SOLUTION RESPIRATORY (INHALATION)
Qty: 360 ML | Refills: 3 | Status: SHIPPED | OUTPATIENT
Start: 2019-02-22 | End: 2019-10-22 | Stop reason: SDUPTHER

## 2019-02-28 ENCOUNTER — OFFICE VISIT (OUTPATIENT)
Dept: FAMILY MEDICINE CLINIC | Age: 59
End: 2019-02-28
Payer: MEDICARE

## 2019-02-28 VITALS
HEART RATE: 99 BPM | RESPIRATION RATE: 14 BRPM | BODY MASS INDEX: 22.08 KG/M2 | SYSTOLIC BLOOD PRESSURE: 132 MMHG | HEIGHT: 62 IN | WEIGHT: 120 LBS | DIASTOLIC BLOOD PRESSURE: 78 MMHG | TEMPERATURE: 98.4 F

## 2019-02-28 DIAGNOSIS — M79.644 THUMB PAIN, RIGHT: ICD-10-CM

## 2019-02-28 DIAGNOSIS — J44.9 CHRONIC OBSTRUCTIVE PULMONARY DISEASE, UNSPECIFIED COPD TYPE (HCC): ICD-10-CM

## 2019-02-28 DIAGNOSIS — R32 URINARY INCONTINENCE, UNSPECIFIED TYPE: ICD-10-CM

## 2019-02-28 DIAGNOSIS — R19.7 DIARRHEA, UNSPECIFIED TYPE: Primary | ICD-10-CM

## 2019-02-28 LAB
HCT VFR BLD CALC: 41.3 % (ref 36–46)
HEMOGLOBIN: 13.9 G/DL (ref 12–16)
PLATELET # BLD: 194 K/ΜL
WBC # BLD: 6.35 10^3/ML

## 2019-02-28 PROCEDURE — 1036F TOBACCO NON-USER: CPT | Performed by: FAMILY MEDICINE

## 2019-02-28 PROCEDURE — G8427 DOCREV CUR MEDS BY ELIG CLIN: HCPCS | Performed by: FAMILY MEDICINE

## 2019-02-28 PROCEDURE — G8484 FLU IMMUNIZE NO ADMIN: HCPCS | Performed by: FAMILY MEDICINE

## 2019-02-28 PROCEDURE — G8420 CALC BMI NORM PARAMETERS: HCPCS | Performed by: FAMILY MEDICINE

## 2019-02-28 PROCEDURE — 81002 URINALYSIS NONAUTO W/O SCOPE: CPT | Performed by: FAMILY MEDICINE

## 2019-02-28 PROCEDURE — 3023F SPIROM DOC REV: CPT | Performed by: FAMILY MEDICINE

## 2019-02-28 PROCEDURE — G8926 SPIRO NO PERF OR DOC: HCPCS | Performed by: FAMILY MEDICINE

## 2019-02-28 PROCEDURE — 99214 OFFICE O/P EST MOD 30 MIN: CPT | Performed by: FAMILY MEDICINE

## 2019-02-28 PROCEDURE — 3017F COLORECTAL CA SCREEN DOC REV: CPT | Performed by: FAMILY MEDICINE

## 2019-02-28 ASSESSMENT — ENCOUNTER SYMPTOMS
SHORTNESS OF BREATH: 0
DIARRHEA: 1

## 2019-03-01 DIAGNOSIS — R32 URINARY INCONTINENCE, UNSPECIFIED TYPE: ICD-10-CM

## 2019-03-01 LAB
BILIRUBIN, POC: NORMAL
BLOOD URINE, POC: NORMAL
CLARITY, POC: CLEAR
COLOR, POC: YELLOW
GLUCOSE URINE, POC: NORMAL
KETONES, POC: NORMAL
LEUKOCYTE EST, POC: NORMAL
NITRITE, POC: NORMAL
PH, POC: 6
PROTEIN, POC: NORMAL
SPECIFIC GRAVITY, POC: 1.01
UROBILINOGEN, POC: NORMAL

## 2019-03-04 LAB
ORGANISM: ABNORMAL
URINE CULTURE, ROUTINE: ABNORMAL
URINE CULTURE, ROUTINE: ABNORMAL

## 2019-03-06 RX ORDER — NITROFURANTOIN 25; 75 MG/1; MG/1
100 CAPSULE ORAL 2 TIMES DAILY
Qty: 14 CAPSULE | Refills: 0 | Status: SHIPPED | OUTPATIENT
Start: 2019-03-06 | End: 2019-03-13

## 2019-03-07 ENCOUNTER — HOSPITAL ENCOUNTER (OUTPATIENT)
Dept: CT IMAGING | Age: 59
Discharge: HOME | End: 2019-03-09
Payer: MEDICARE

## 2019-03-07 DIAGNOSIS — R91.1 LUNG NODULE: ICD-10-CM

## 2019-03-07 PROCEDURE — 71250 CT THORAX DX C-: CPT

## 2019-03-29 ENCOUNTER — OFFICE VISIT (OUTPATIENT)
Dept: PULMONOLOGY | Age: 59
End: 2019-03-29
Payer: MEDICARE

## 2019-03-29 VITALS
BODY MASS INDEX: 22.82 KG/M2 | WEIGHT: 124 LBS | HEART RATE: 86 BPM | RESPIRATION RATE: 16 BRPM | DIASTOLIC BLOOD PRESSURE: 62 MMHG | TEMPERATURE: 99.5 F | HEIGHT: 62 IN | SYSTOLIC BLOOD PRESSURE: 120 MMHG | OXYGEN SATURATION: 96 %

## 2019-03-29 DIAGNOSIS — J44.9 CHRONIC OBSTRUCTIVE PULMONARY DISEASE, UNSPECIFIED COPD TYPE (HCC): ICD-10-CM

## 2019-03-29 DIAGNOSIS — R91.1 LUNG NODULE: Primary | ICD-10-CM

## 2019-03-29 PROCEDURE — 3023F SPIROM DOC REV: CPT | Performed by: INTERNAL MEDICINE

## 2019-03-29 PROCEDURE — G8420 CALC BMI NORM PARAMETERS: HCPCS | Performed by: INTERNAL MEDICINE

## 2019-03-29 PROCEDURE — G8926 SPIRO NO PERF OR DOC: HCPCS | Performed by: INTERNAL MEDICINE

## 2019-03-29 PROCEDURE — 1036F TOBACCO NON-USER: CPT | Performed by: INTERNAL MEDICINE

## 2019-03-29 PROCEDURE — G8427 DOCREV CUR MEDS BY ELIG CLIN: HCPCS | Performed by: INTERNAL MEDICINE

## 2019-03-29 PROCEDURE — 99214 OFFICE O/P EST MOD 30 MIN: CPT | Performed by: INTERNAL MEDICINE

## 2019-03-29 PROCEDURE — 3017F COLORECTAL CA SCREEN DOC REV: CPT | Performed by: INTERNAL MEDICINE

## 2019-03-29 PROCEDURE — G8484 FLU IMMUNIZE NO ADMIN: HCPCS | Performed by: INTERNAL MEDICINE

## 2019-03-29 RX ORDER — NEBULIZER ACCESSORIES
EACH MISCELLANEOUS
Qty: 1 EACH | Refills: 0 | Status: SHIPPED | OUTPATIENT
Start: 2019-03-29 | End: 2019-08-12 | Stop reason: SDUPTHER

## 2019-03-29 ASSESSMENT — ENCOUNTER SYMPTOMS
ABDOMINAL PAIN: 0
NAUSEA: 0
DIARRHEA: 0
TROUBLE SWALLOWING: 0
SHORTNESS OF BREATH: 1
VOICE CHANGE: 0
RHINORRHEA: 0
CHEST TIGHTNESS: 0
SORE THROAT: 0
SINUS PRESSURE: 0
EYE ITCHING: 0
VOMITING: 0
EYE DISCHARGE: 0
WHEEZING: 1
COUGH: 0

## 2019-05-22 ENCOUNTER — TELEPHONE (OUTPATIENT)
Dept: PULMONOLOGY | Age: 59
End: 2019-05-22

## 2019-06-07 ENCOUNTER — HOSPITAL ENCOUNTER (OUTPATIENT)
Dept: CT IMAGING | Age: 59
Discharge: HOME | End: 2019-06-09
Payer: MEDICARE

## 2019-06-07 DIAGNOSIS — R91.1 LUNG NODULE: ICD-10-CM

## 2019-06-07 PROCEDURE — 71250 CT THORAX DX C-: CPT

## 2019-06-21 ENCOUNTER — OFFICE VISIT (OUTPATIENT)
Dept: PULMONOLOGY | Age: 59
End: 2019-06-21
Payer: MEDICARE

## 2019-06-21 VITALS
HEART RATE: 66 BPM | RESPIRATION RATE: 16 BRPM | DIASTOLIC BLOOD PRESSURE: 60 MMHG | OXYGEN SATURATION: 95 % | WEIGHT: 125 LBS | SYSTOLIC BLOOD PRESSURE: 118 MMHG | BODY MASS INDEX: 23 KG/M2 | HEIGHT: 62 IN | TEMPERATURE: 98.1 F

## 2019-06-21 DIAGNOSIS — R91.1 LUNG NODULE: Primary | ICD-10-CM

## 2019-06-21 DIAGNOSIS — J44.9 CHRONIC OBSTRUCTIVE PULMONARY DISEASE, UNSPECIFIED COPD TYPE (HCC): ICD-10-CM

## 2019-06-21 PROCEDURE — 3023F SPIROM DOC REV: CPT | Performed by: INTERNAL MEDICINE

## 2019-06-21 PROCEDURE — 1036F TOBACCO NON-USER: CPT | Performed by: INTERNAL MEDICINE

## 2019-06-21 PROCEDURE — G8427 DOCREV CUR MEDS BY ELIG CLIN: HCPCS | Performed by: INTERNAL MEDICINE

## 2019-06-21 PROCEDURE — 99214 OFFICE O/P EST MOD 30 MIN: CPT | Performed by: INTERNAL MEDICINE

## 2019-06-21 PROCEDURE — G8420 CALC BMI NORM PARAMETERS: HCPCS | Performed by: INTERNAL MEDICINE

## 2019-06-21 PROCEDURE — 3017F COLORECTAL CA SCREEN DOC REV: CPT | Performed by: INTERNAL MEDICINE

## 2019-06-21 PROCEDURE — G8926 SPIRO NO PERF OR DOC: HCPCS | Performed by: INTERNAL MEDICINE

## 2019-06-21 RX ORDER — ALBUTEROL SULFATE 90 UG/1
2 AEROSOL, METERED RESPIRATORY (INHALATION) EVERY 6 HOURS PRN
Qty: 1 INHALER | Refills: 3 | Status: SHIPPED | OUTPATIENT
Start: 2019-06-21 | End: 2019-07-02

## 2019-06-21 ASSESSMENT — ENCOUNTER SYMPTOMS
NAUSEA: 0
SORE THROAT: 0
RHINORRHEA: 0
COUGH: 0
EYE ITCHING: 0
DIARRHEA: 0
VOMITING: 0
SINUS PRESSURE: 0
EYE DISCHARGE: 0
VOICE CHANGE: 0
ABDOMINAL PAIN: 0
CHEST TIGHTNESS: 0
TROUBLE SWALLOWING: 0
WHEEZING: 1
SHORTNESS OF BREATH: 1

## 2019-06-21 NOTE — PROGRESS NOTES
Subjective:     Kwabena Hale is a 59 y.o. female who complains today of:     Chief Complaint   Patient presents with    Follow-up     three month f/u for CT results. HPI  CT chest done, Patient came for f/u after Ct chest done     Patient is using Brovana nebulizer twice a day and nebulizer with albuterol and ipratropium 4 times a day when necessary and proair HFA 2 puffs when necessary  C/o shortness of breath  with exertion. Occasional Wheezing   No cough   No Hemoptysis  No Chest tightness   No Chest pain with radiation  or pleuritic pain  No Fever or chills. No Rhinorrhea and postnasal drip. She quit smoking almost 3 yrs    Allergies:  Pcn [penicillins]; Corticosteroids; Lidocaine; Contrast [iodides]; Naprosyn [naproxen]; and Valium [diazepam]  Past Medical History:   Diagnosis Date    Acute respiratory failure ([address]     Arthritis     general joints    Cataracts, bilateral 05/03/2017    COPD (chronic obstructive pulmonary disease) (HCC)     Fracture of coccyx (HCC)     History of non-ST elevation myocardial infarction (NSTEMI) 3/17/2016    History of tobacco abuse     past smoker / quit > 2 yrs    HTN (hypertension)     past trx    Lung disease     Tendinitis of wrist     BIALTERAL     Past Surgical History:   Procedure Laterality Date    BREAST SURGERY      bilateral implants    CARDIAC CATHETERIZATION  3/2016    CATARACT REMOVAL Left 10/12/15     DR. SANTIAGO    CATARACT REMOVAL WITH IMPLANT Right 11/2/15       [facility]    CHOLECYSTECTOMY, LAPAROSCOPIC N/A 7/14/2017    CHOLECYSTECTOMY LAPAROSCOPIC - CHOLANGIOGRAM POSS OPEN performed by Ana Allen MD at [facility]  2005    EYE SURGERY      [place] with IOL OU    TUBAL LIGATION       Family History   Problem Relation Age of Onset    Heart Disease Father     High Blood Pressure Father      Social History     Socioeconomic History    Marital status: Single     Spouse name: Not on file    Number of children: Not on file    Years of education: Not on file    Highest education level: Not on file   Occupational History    Not on file   Social Needs    Financial resource strain: Not on file    Food insecurity:     Worry: Not on file     Inability: Not on file    Transportation needs:     Medical: Not on file     Non-medical: Not on file   Tobacco Use    Smoking status: Former Smoker     Packs/day: 0.50     Years: 30.00     Pack years: 15.00     Last attempt to quit: 12/4/2015     Years since quitting: 3.5    Smokeless tobacco: Never Used   Substance and Sexual Activity    Alcohol use: No    Drug use: No    Sexual activity: Not on file   Lifestyle    Physical activity:     Days per week: Not on file     Minutes per session: Not on file    Stress: Not on file   Relationships    Social connections:     Talks on phone: Not on file     Gets together: Not on file     Attends Sabianism service: Not on file     Active member of club or organization: Not on file     Attends meetings of clubs or organizations: Not on file     Relationship status: Not on file    Intimate partner violence:     Fear of current or ex partner: Not on file     Emotionally abused: Not on file     Physically abused: Not on file     Forced sexual activity: Not on file   Other Topics Concern    Not on file   Social History Narrative    Not on file         Review of Systems   Constitutional: Negative for chills, diaphoresis, fatigue and fever. HENT: Negative for congestion, mouth sores, nosebleeds, postnasal drip, rhinorrhea, sinus pressure, sneezing, sore throat, trouble swallowing and voice change. Eyes: Negative for discharge, itching and visual disturbance. Respiratory: Positive for shortness of breath and wheezing. Negative for cough and chest tightness. Cardiovascular: Negative for chest pain, palpitations and leg swelling. Gastrointestinal: Negative for abdominal pain, diarrhea, nausea and vomiting. WITHOUT EVIDENCE OF ACTIVE CARDIOPULMONARY DISEASE.           ]  No results found for this or any previous visit.]  Results for orders placed during the hospital encounter of 06/07/19   CT CHEST WO CONTRAST    Narrative EXAMINATION: CT CHEST WITHOUT CONTRAST    CLINICAL HISTORY: Follow-up: R91.1 Lung nodule ICD10    COMPARISONS: 3/7/2019, 8/16/2018    TECHNIQUE: TECHNIQUE: Contiguous axial images were obtained through the chest without contrast. Coronal and sagittal reformations were made. FINDINGS:    Again seen is an ovoid right upper lobe groundglass opacity with minimal ill-defined solid component, series 3 image 31. This opacity measures approximately 13 x 15 mm, similar last exam. On 8/16/2018 the lesion measured 11 x 11 mm. Recommend follow-up   chest CT in 3 months versus wedge resection. In the lingula there is a 3 mm nodule which abuts the minor fissure. This nodule is unchanged. No new lung nodule. No acute infiltrate. Lungs are hyperexpanded. No evidence of aortic aneurysm. There are atherosclerotic calcifications in the aortic arch. No mediastinal or hilar lymphadenopathy. No pericardial effusion. No cardiac enlargement. No pleural effusion. No pneumothorax. There are bilateral breast   implants with peripheral calcification. Upper abdomen is unremarkable. Bones are intact. Dorsal spine is kyphotic. Mild dextroscoliosis dorsal spine. Impression GROUNDGLASS OPACITY WITH MINIMAL SOLID COMPONENT REMAINS UNCHANGED FOR 3 MONTHS AGO. IT HAS INCREASED IN SIZE SINCE ONE YEAR AGO. RECOMMEND EITHER FOLLOW-UP CT SCAN IN 3 MONTHS OR WEDGE RESECTION.    3 NODULAR STABLE NODULE LINGULA. NO ACUTE INFILTRATE. HYPEREXPANSION OF LUNGS, SUSPECTED COPD. PERIPHERALLY CALCIFIED BREAST IMPLANTS. All CT scans at this facility use dose modulation, iterative reconstruction, and/or weight based dosing when appropriate to reduce radiation dose to as low as reasonably achievable. Assessment/Plan:     1. Lung nodule  CT chest done June 7, 2019 showing groundglass opacity with minimal solid component remain unchanged for 3 months ago. It has increased in size since one year ago. recommend follow-up CT chest in 3 months or wedge resection. She is aware that malignancy cannot be ruled out at this time. She does not want biopsy and she  wants to go for CT chest in 3 months. She does not want to do any surgery. Follow-up CT chest requested  - CT CHEST WO CONTRAST; Future    2. Chronic obstructive pulmonary disease, unspecified COPD type (HCC)   Patient is using Brovana nebulizer twice a day and nebulizer with albuterol and ipratropium 4 times a day when necessary and proair HFA 2 puffs when necessary. C/o shortness of breath  with exertion. Occasional Wheezing . Continue bronchodilator therapy as before        Return in about 4 months (around 10/21/2019) for COPD.       Nisha Graham MD

## 2019-07-02 DIAGNOSIS — J44.9 CHRONIC OBSTRUCTIVE PULMONARY DISEASE, UNSPECIFIED COPD TYPE (HCC): Primary | ICD-10-CM

## 2019-07-02 RX ORDER — ALBUTEROL SULFATE 90 UG/1
2 AEROSOL, METERED RESPIRATORY (INHALATION) EVERY 6 HOURS PRN
Qty: 1 INHALER | Refills: 3 | Status: SHIPPED | OUTPATIENT
Start: 2019-07-02 | End: 2020-05-26 | Stop reason: SDUPTHER

## 2019-08-12 DIAGNOSIS — J44.9 CHRONIC OBSTRUCTIVE PULMONARY DISEASE, UNSPECIFIED COPD TYPE (HCC): ICD-10-CM

## 2019-08-12 RX ORDER — NEBULIZER ACCESSORIES
EACH MISCELLANEOUS
Qty: 1 EACH | Refills: 0 | Status: SHIPPED | OUTPATIENT
Start: 2019-08-12 | End: 2020-05-26 | Stop reason: SDUPTHER

## 2019-08-21 ENCOUNTER — TELEPHONE (OUTPATIENT)
Dept: PULMONOLOGY | Age: 59
End: 2019-08-21

## 2019-08-23 DIAGNOSIS — J44.9 CHRONIC OBSTRUCTIVE PULMONARY DISEASE, UNSPECIFIED COPD TYPE (HCC): Primary | ICD-10-CM

## 2019-08-28 ENCOUNTER — TELEPHONE (OUTPATIENT)
Dept: PULMONOLOGY | Age: 59
End: 2019-08-28

## 2019-10-10 ENCOUNTER — HOSPITAL ENCOUNTER (OUTPATIENT)
Dept: CT IMAGING | Age: 59
Discharge: HOME | End: 2019-10-12
Payer: MEDICARE

## 2019-10-10 DIAGNOSIS — R91.1 LUNG NODULE: ICD-10-CM

## 2019-10-10 PROCEDURE — 71250 CT THORAX DX C-: CPT

## 2019-10-17 ENCOUNTER — TELEPHONE (OUTPATIENT)
Dept: FAMILY MEDICINE CLINIC | Age: 59
End: 2019-10-17

## 2019-10-17 DIAGNOSIS — Z12.11 COLON CANCER SCREENING: Primary | ICD-10-CM

## 2019-10-22 ENCOUNTER — OFFICE VISIT (OUTPATIENT)
Dept: PULMONOLOGY | Age: 59
End: 2019-10-22
Payer: MEDICARE

## 2019-10-22 VITALS
SYSTOLIC BLOOD PRESSURE: 122 MMHG | HEART RATE: 86 BPM | TEMPERATURE: 97.3 F | WEIGHT: 127 LBS | BODY MASS INDEX: 23.37 KG/M2 | RESPIRATION RATE: 16 BRPM | DIASTOLIC BLOOD PRESSURE: 80 MMHG | HEIGHT: 62 IN | OXYGEN SATURATION: 97 %

## 2019-10-22 DIAGNOSIS — J44.9 CHRONIC OBSTRUCTIVE PULMONARY DISEASE, UNSPECIFIED COPD TYPE (HCC): Primary | ICD-10-CM

## 2019-10-22 DIAGNOSIS — R91.1 LUNG NODULE: ICD-10-CM

## 2019-10-22 PROCEDURE — 1036F TOBACCO NON-USER: CPT | Performed by: INTERNAL MEDICINE

## 2019-10-22 PROCEDURE — G8926 SPIRO NO PERF OR DOC: HCPCS | Performed by: INTERNAL MEDICINE

## 2019-10-22 PROCEDURE — G8420 CALC BMI NORM PARAMETERS: HCPCS | Performed by: INTERNAL MEDICINE

## 2019-10-22 PROCEDURE — G8482 FLU IMMUNIZE ORDER/ADMIN: HCPCS | Performed by: INTERNAL MEDICINE

## 2019-10-22 PROCEDURE — 3017F COLORECTAL CA SCREEN DOC REV: CPT | Performed by: INTERNAL MEDICINE

## 2019-10-22 PROCEDURE — 3023F SPIROM DOC REV: CPT | Performed by: INTERNAL MEDICINE

## 2019-10-22 PROCEDURE — 99214 OFFICE O/P EST MOD 30 MIN: CPT | Performed by: INTERNAL MEDICINE

## 2019-10-22 PROCEDURE — G8427 DOCREV CUR MEDS BY ELIG CLIN: HCPCS | Performed by: INTERNAL MEDICINE

## 2019-10-22 RX ORDER — ALBUTEROL SULFATE 90 UG/1
2 AEROSOL, METERED RESPIRATORY (INHALATION) EVERY 6 HOURS PRN
Qty: 1 INHALER | Refills: 3 | Status: SHIPPED | OUTPATIENT
Start: 2019-10-22

## 2019-10-22 RX ORDER — ARFORMOTEROL TARTRATE 15 UG/2ML
15 SOLUTION RESPIRATORY (INHALATION) 2 TIMES DAILY
Qty: 360 ML | Refills: 3 | Status: SHIPPED | OUTPATIENT
Start: 2019-10-22 | End: 2020-12-18 | Stop reason: SDUPTHER

## 2019-10-22 RX ORDER — ARFORMOTEROL TARTRATE 15 UG/2ML
SOLUTION RESPIRATORY (INHALATION)
Qty: 360 ML | Refills: 3 | OUTPATIENT
Start: 2019-10-22

## 2019-10-22 ASSESSMENT — ENCOUNTER SYMPTOMS
NAUSEA: 0
DIARRHEA: 0
RHINORRHEA: 0
ABDOMINAL PAIN: 0
CHEST TIGHTNESS: 0
EYE ITCHING: 0
VOMITING: 0
SINUS PRESSURE: 0
TROUBLE SWALLOWING: 0
COUGH: 1
WHEEZING: 1
VOICE CHANGE: 0
SHORTNESS OF BREATH: 1
EYE DISCHARGE: 0
SORE THROAT: 0

## 2019-10-29 ENCOUNTER — TELEPHONE (OUTPATIENT)
Dept: FAMILY MEDICINE CLINIC | Age: 59
End: 2019-10-29

## 2019-12-20 ENCOUNTER — TELEPHONE (OUTPATIENT)
Dept: FAMILY MEDICINE CLINIC | Age: 59
End: 2019-12-20

## 2020-03-31 ENCOUNTER — TELEPHONE (OUTPATIENT)
Dept: FAMILY MEDICINE CLINIC | Age: 60
End: 2020-03-31

## 2020-04-02 ENCOUNTER — OFFICE VISIT (OUTPATIENT)
Dept: SURGERY | Age: 60
End: 2020-04-02
Payer: MEDICARE

## 2020-04-02 VITALS
WEIGHT: 135 LBS | HEIGHT: 62 IN | SYSTOLIC BLOOD PRESSURE: 132 MMHG | BODY MASS INDEX: 24.84 KG/M2 | TEMPERATURE: 97.4 F | DIASTOLIC BLOOD PRESSURE: 84 MMHG

## 2020-04-02 DIAGNOSIS — R10.11 RIGHT UPPER QUADRANT ABDOMINAL PAIN: ICD-10-CM

## 2020-04-02 LAB
ALBUMIN SERPL-MCNC: 4.3 G/DL (ref 3.5–4.6)
ALP BLD-CCNC: 53 U/L (ref 40–130)
ALT SERPL-CCNC: 21 U/L (ref 0–33)
ANION GAP SERPL CALCULATED.3IONS-SCNC: 20 MEQ/L (ref 9–15)
AST SERPL-CCNC: 33 U/L (ref 0–35)
BILIRUB SERPL-MCNC: 0.7 MG/DL (ref 0.2–0.7)
BUN BLDV-MCNC: 23 MG/DL (ref 8–23)
CALCIUM SERPL-MCNC: 9.7 MG/DL (ref 8.5–9.9)
CHLORIDE BLD-SCNC: 92 MEQ/L (ref 95–107)
CO2: 24 MEQ/L (ref 20–31)
CREAT SERPL-MCNC: 0.76 MG/DL (ref 0.5–0.9)
GFR AFRICAN AMERICAN: >60
GFR NON-AFRICAN AMERICAN: >60
GLOBULIN: 3.6 G/DL (ref 2.3–3.5)
GLUCOSE BLD-MCNC: 55 MG/DL (ref 70–99)
HCT VFR BLD CALC: 39.4 % (ref 37–47)
HEMOGLOBIN: 13.4 G/DL (ref 12–16)
LIPASE: 24 U/L (ref 12–95)
MCH RBC QN AUTO: 32.3 PG (ref 27–31.3)
MCHC RBC AUTO-ENTMCNC: 34 % (ref 33–37)
MCV RBC AUTO: 95.1 FL (ref 82–100)
PDW BLD-RTO: 14.5 % (ref 11.5–14.5)
PLATELET # BLD: 161 K/UL (ref 130–400)
POTASSIUM SERPL-SCNC: 3.5 MEQ/L (ref 3.4–4.9)
RBC # BLD: 4.15 M/UL (ref 4.2–5.4)
SODIUM BLD-SCNC: 136 MEQ/L (ref 135–144)
TOTAL PROTEIN: 7.9 G/DL (ref 6.3–8)
WBC # BLD: 6.5 K/UL (ref 4.8–10.8)

## 2020-04-02 PROCEDURE — 1036F TOBACCO NON-USER: CPT | Performed by: SURGERY

## 2020-04-02 PROCEDURE — 99202 OFFICE O/P NEW SF 15 MIN: CPT | Performed by: SURGERY

## 2020-04-02 PROCEDURE — 3017F COLORECTAL CA SCREEN DOC REV: CPT | Performed by: SURGERY

## 2020-04-02 PROCEDURE — G8427 DOCREV CUR MEDS BY ELIG CLIN: HCPCS | Performed by: SURGERY

## 2020-04-02 PROCEDURE — G8420 CALC BMI NORM PARAMETERS: HCPCS | Performed by: SURGERY

## 2020-04-02 RX ORDER — LIFITEGRAST 50 MG/ML
SOLUTION/ DROPS OPHTHALMIC
COMMUNITY

## 2020-04-02 ASSESSMENT — ENCOUNTER SYMPTOMS
BLOOD IN STOOL: 0
CHEST TIGHTNESS: 0
DIARRHEA: 1
RHINORRHEA: 0
VOMITING: 1
COLOR CHANGE: 0
NAUSEA: 1
RECTAL PAIN: 0
ALLERGIC/IMMUNOLOGIC NEGATIVE: 1
ABDOMINAL PAIN: 1
ABDOMINAL DISTENTION: 0
SHORTNESS OF BREATH: 0

## 2020-05-18 ENCOUNTER — OFFICE VISIT (OUTPATIENT)
Dept: SURGERY | Age: 60
End: 2020-05-18
Payer: MEDICARE

## 2020-05-18 VITALS
HEIGHT: 62 IN | WEIGHT: 136 LBS | BODY MASS INDEX: 25.03 KG/M2 | SYSTOLIC BLOOD PRESSURE: 118 MMHG | TEMPERATURE: 98.1 F | DIASTOLIC BLOOD PRESSURE: 82 MMHG

## 2020-05-18 PROCEDURE — 3017F COLORECTAL CA SCREEN DOC REV: CPT | Performed by: SURGERY

## 2020-05-18 PROCEDURE — 99213 OFFICE O/P EST LOW 20 MIN: CPT | Performed by: SURGERY

## 2020-05-18 PROCEDURE — G8420 CALC BMI NORM PARAMETERS: HCPCS | Performed by: SURGERY

## 2020-05-18 PROCEDURE — G8427 DOCREV CUR MEDS BY ELIG CLIN: HCPCS | Performed by: SURGERY

## 2020-05-18 PROCEDURE — 1036F TOBACCO NON-USER: CPT | Performed by: SURGERY

## 2020-05-18 ASSESSMENT — ENCOUNTER SYMPTOMS
ABDOMINAL PAIN: 1
ABDOMINAL DISTENTION: 0
COLOR CHANGE: 0
ALLERGIC/IMMUNOLOGIC NEGATIVE: 1
NAUSEA: 1
RHINORRHEA: 0
VOMITING: 1
RECTAL PAIN: 0
DIARRHEA: 1
CHEST TIGHTNESS: 0
BLOOD IN STOOL: 0
SHORTNESS OF BREATH: 0

## 2020-05-22 ENCOUNTER — TELEPHONE (OUTPATIENT)
Dept: FAMILY MEDICINE CLINIC | Age: 60
End: 2020-05-22

## 2020-05-22 NOTE — TELEPHONE ENCOUNTER
Pt does not have a voicemail set up could not leave a message to reschedule appointment or change to doxy

## 2020-05-26 ENCOUNTER — TELEPHONE (OUTPATIENT)
Dept: FAMILY MEDICINE CLINIC | Age: 60
End: 2020-05-26

## 2020-05-26 ENCOUNTER — OFFICE VISIT (OUTPATIENT)
Dept: FAMILY MEDICINE CLINIC | Age: 60
End: 2020-05-26
Payer: MEDICARE

## 2020-05-26 VITALS
TEMPERATURE: 98.4 F | HEART RATE: 60 BPM | BODY MASS INDEX: 25.03 KG/M2 | SYSTOLIC BLOOD PRESSURE: 130 MMHG | DIASTOLIC BLOOD PRESSURE: 72 MMHG | WEIGHT: 136 LBS | HEIGHT: 62 IN | RESPIRATION RATE: 13 BRPM | OXYGEN SATURATION: 97 %

## 2020-05-26 DIAGNOSIS — E55.9 VITAMIN D DEFICIENCY: ICD-10-CM

## 2020-05-26 LAB — VITAMIN D 25-HYDROXY: 105.2 NG/ML (ref 30–100)

## 2020-05-26 PROCEDURE — 3017F COLORECTAL CA SCREEN DOC REV: CPT | Performed by: NURSE PRACTITIONER

## 2020-05-26 PROCEDURE — G8510 SCR DEP NEG, NO PLAN REQD: HCPCS | Performed by: NURSE PRACTITIONER

## 2020-05-26 PROCEDURE — 99214 OFFICE O/P EST MOD 30 MIN: CPT | Performed by: NURSE PRACTITIONER

## 2020-05-26 PROCEDURE — G8427 DOCREV CUR MEDS BY ELIG CLIN: HCPCS | Performed by: NURSE PRACTITIONER

## 2020-05-26 PROCEDURE — 1036F TOBACCO NON-USER: CPT | Performed by: NURSE PRACTITIONER

## 2020-05-26 PROCEDURE — G8420 CALC BMI NORM PARAMETERS: HCPCS | Performed by: NURSE PRACTITIONER

## 2020-05-26 ASSESSMENT — PATIENT HEALTH QUESTIONNAIRE - PHQ9
SUM OF ALL RESPONSES TO PHQ QUESTIONS 1-9: 0
SUM OF ALL RESPONSES TO PHQ9 QUESTIONS 1 & 2: 0
1. LITTLE INTEREST OR PLEASURE IN DOING THINGS: 0
SUM OF ALL RESPONSES TO PHQ QUESTIONS 1-9: 0
2. FEELING DOWN, DEPRESSED OR HOPELESS: 0

## 2020-05-26 ASSESSMENT — ENCOUNTER SYMPTOMS
NAUSEA: 0
DOUBLE VISION: 0
EYE PAIN: 0
SHORTNESS OF BREATH: 0
EYE DISCHARGE: 0
PHOTOPHOBIA: 0
EYE ITCHING: 0
VOMITING: 0
FOREIGN BODY SENSATION: 0
BLURRED VISION: 0
EYE REDNESS: 0

## 2020-05-26 NOTE — TELEPHONE ENCOUNTER
She does not want a mammogram because her implants had busted with previous mammogram. She would like to start with ultrasound. Diagnosis for carotid ultrasound was changed and new order placed.

## 2020-05-26 NOTE — PROGRESS NOTES
Emotionally abused: Not on file     Physically abused: Not on file     Forced sexual activity: Not on file   Other Topics Concern    Not on file   Social History Narrative    Not on file     Current Outpatient Medications on File Prior to Visit   Medication Sig Dispense Refill    Lifitegrast (XIIDRA) 5 % SOLN Apply to eye      Arformoterol Tartrate (BROVANA) 15 MCG/2ML NEBU Take 2 mLs by nebulization 2 times daily use 1 vial in nebulizer twice a day DX COPD J44.9 360 mL 3    albuterol sulfate HFA (PROVENTIL HFA) 108 (90 Base) MCG/ACT inhaler Inhale 2 puffs into the lungs every 6 hours as needed for Wheezing 1 Inhaler 3    Nebulizers (AIRIAL COMPACT MINI NEBULIZER) MISC PLEASE PROVIDE PATIENT WITH [employer] PORTABLE NEBULIZER UNIT. HEALTH CARE SOLUTIONS 1 each 0    Carboxymethylcell-Hypromellose (GENTEAL OP) Apply to eye      albuterol (PROVENTIL) (2.5 MG/3ML) 0.083% nebulizer solution Take 3 mLs by nebulization every 4 hours as needed for Wheezing Dx j44.9 copd 120 each 5    ipratropium (ATROVENT) 0.02 % nebulizer solution INHALE ONE VIAL BY NEBULIZER 4 TIMES DAILY 360 mL 7    Respiratory Therapy Supplies (NEBULIZER AIR TUBE/PLUGS) MISC Nebulizer supply, cup and tubing 1 each 0    POTASSIUM PO Take by mouth      vitamin E 1000 units capsule Take 1,000 Units by mouth daily      magnesium 30 MG tablet Take 30 mg by mouth daily      vitamin B-12 (CYANOCOBALAMIN) 100 MCG tablet Take 50 mcg by mouth daily       No current facility-administered medications on file prior to visit. Allergies:  Pcn [penicillins]; Corticosteroids; Lidocaine; Contrast [iodides]; Glycerin; Naprosyn [naproxen]; and Valium [diazepam]    Review of Systems   Constitutional: Negative for chills, fatigue and fever. Eyes: Positive for visual disturbance. Negative for blurred vision, double vision, photophobia, pain, discharge, redness and itching. Respiratory: Negative for shortness of breath.     Cardiovascular: Negative for Standing Status:   Future     Standing Expiration Date:   5/26/2021     Order Specific Question:   Reason for exam:     Answer:   breast mass, painful    Vitamin D 25 Hydroxy     Standing Status:   Future     Number of Occurrences:   1     Standing Expiration Date:   5/26/2021        No orders of the defined types were placed in this encounter. Return in about 1 month (around 6/26/2020) for evaluation with Dr. Fermin Friend. Breast mass/tenderness- discussed that previous CT 6/2019 did show calcified breast implants and it was very hard like it was calcified, but given hx I would be agreeable to ultrasound. She was agreeable. Visual disturbance- will evaluate carotid and f/u with PCP to discuss results and further evaluation. Vitamin d level checked to see if she should resume supplement and how much. Will update with results when available. Reviewed with the patient: current clinicalstatus, medications, activities and diet. Side effects, adverse effects of the medication prescribedtoday, as well as treatment plan/ rationale and result expectations have been discussedwith the patient who expresses understanding and desires to proceed. Close follow upto evaluate treatment results and for coordination of care. I have reviewedthe patient's medical history in detail and updated the computerized patient record.     Nemesio Flores, LIMA - CNP

## 2020-05-27 ENCOUNTER — TELEPHONE (OUTPATIENT)
Dept: INTERNAL MEDICINE | Age: 60
End: 2020-05-27

## 2020-06-03 ENCOUNTER — HOSPITAL ENCOUNTER (OUTPATIENT)
Dept: ULTRASOUND IMAGING | Age: 60
Discharge: HOME | End: 2020-06-05
Payer: MEDICARE

## 2020-06-03 PROCEDURE — 76641 ULTRASOUND BREAST COMPLETE: CPT

## 2020-06-16 ENCOUNTER — OFFICE VISIT (OUTPATIENT)
Dept: PULMONOLOGY | Age: 60
End: 2020-06-16
Payer: MEDICARE

## 2020-06-16 VITALS
HEART RATE: 96 BPM | RESPIRATION RATE: 16 BRPM | BODY MASS INDEX: 23.92 KG/M2 | WEIGHT: 130 LBS | SYSTOLIC BLOOD PRESSURE: 112 MMHG | DIASTOLIC BLOOD PRESSURE: 70 MMHG | OXYGEN SATURATION: 99 % | TEMPERATURE: 97.8 F | HEIGHT: 62 IN

## 2020-06-16 PROCEDURE — 3017F COLORECTAL CA SCREEN DOC REV: CPT | Performed by: INTERNAL MEDICINE

## 2020-06-16 PROCEDURE — G8427 DOCREV CUR MEDS BY ELIG CLIN: HCPCS | Performed by: INTERNAL MEDICINE

## 2020-06-16 PROCEDURE — G8926 SPIRO NO PERF OR DOC: HCPCS | Performed by: INTERNAL MEDICINE

## 2020-06-16 PROCEDURE — 1036F TOBACCO NON-USER: CPT | Performed by: INTERNAL MEDICINE

## 2020-06-16 PROCEDURE — 99214 OFFICE O/P EST MOD 30 MIN: CPT | Performed by: INTERNAL MEDICINE

## 2020-06-16 PROCEDURE — G8420 CALC BMI NORM PARAMETERS: HCPCS | Performed by: INTERNAL MEDICINE

## 2020-06-16 PROCEDURE — 3023F SPIROM DOC REV: CPT | Performed by: INTERNAL MEDICINE

## 2020-06-16 ASSESSMENT — ENCOUNTER SYMPTOMS
TROUBLE SWALLOWING: 0
COUGH: 0
WHEEZING: 1
SINUS PRESSURE: 0
SORE THROAT: 0
VOICE CHANGE: 0
CHEST TIGHTNESS: 0
VOMITING: 0
DIARRHEA: 0
NAUSEA: 0
RHINORRHEA: 0
SHORTNESS OF BREATH: 1
EYE DISCHARGE: 0
EYE ITCHING: 0
ABDOMINAL PAIN: 0

## 2020-06-16 NOTE — PROGRESS NOTES
urinating and hematuria. Musculoskeletal: Negative for arthralgias, joint swelling and myalgias. Skin: Negative for rash. Allergic/Immunologic: Negative for environmental allergies and food allergies. Neurological: Negative for dizziness, tremors, weakness and headaches. Psychiatric/Behavioral: Negative for behavioral problems and sleep disturbance.         :     Vitals:    06/16/20 0827   BP: 112/70   Pulse: 96   Resp: 16   Temp: 97.8 °F (36.6 °C)   TempSrc: Temporal   SpO2: 99%   Weight: 130 lb (59 kg)   Height: 5' 2\" (1.575 m)     Wt Readings from Last 3 Encounters:   06/16/20 130 lb (59 kg)   05/26/20 136 lb (61.7 kg)   05/18/20 136 lb (61.7 kg)         Physical Exam  Constitutional:       General: She is not in acute distress. Appearance: She is well-developed. She is not diaphoretic. HENT:      Head: Normocephalic and atraumatic. Nose: Nose normal.   Eyes:      Pupils: Pupils are equal, round, and reactive to light. Neck:      Thyroid: No thyromegaly. Vascular: No JVD. Trachea: No tracheal deviation. Cardiovascular:      Rate and Rhythm: Normal rate and regular rhythm. Heart sounds: No murmur. No friction rub. No gallop. Pulmonary:      Effort: No respiratory distress. Breath sounds: No wheezing or rales. Comments: diminished Breath sound bilaterally. Chest:      Chest wall: No tenderness. Abdominal:      General: There is no distension. Tenderness: There is no abdominal tenderness. There is no rebound. Musculoskeletal: Normal range of motion. Lymphadenopathy:      Cervical: No cervical adenopathy. Skin:     General: Skin is warm and dry. Neurological:      Mental Status: She is alert and oriented to person, place, and time.       Coordination: Coordination normal.         Current Outpatient Medications   Medication Sig Dispense Refill    Lifitegrast (XIIDRA) 5 % SOLN Apply to eye      Arformoterol Tartrate (BROVANA) 15 MCG/2ML NEBU Take 2 found for this or any previous visit.]  Results for orders placed during the hospital encounter of 10/10/19   CT CHEST WO CONTRAST    Narrative EXAMINATION: CT CHEST WO CONTRAST    DATE:10/10/2019 8:38 AM    CLINICAL HISTORY: R91.1 Lung nodule ICD10     COMPARISON:  March 7, 2019, August 16, 2018, August 8, 2017. TECHNIQUE: Helical CT was performed through the chest without IV contrast., All CT scans at this facility use dose modulation, iterative reconstruction, and/or weight based dosing when appropriate to reduce radiation dose to as low as reasonably   achievable. FINDINGS     Lungs: Stable groundglass opacity in the right apex measuring approximately 13 mm. No significant change since August 2018. Continued surveillance with CT can be performed in 1 to 2 years. There is a stable 3 mm left perifissural nodule. Mediastinum :Mediastinum and marivel are unremarkable. Pleura:Normal. No effusion or thickening     Vessels:Thoracic aorta is intact. Bones:Normal     Upper abdomen:No significant abnormality of the visualized structures of the upper abdomen      Impression STABLE 1.3 CM GROUNDGLASS OPACITY RIGHT APEX. CONTINUED SURVEILLANCE CAN BE PERFORMED WITH CT IN 1 TO 2 YEARS.     ]  No results found for this or any previous visit.]    Assessment/Plan:     1. Chronic obstructive pulmonary disease, unspecified COPD type ([address]  She  is using Brovana nebulizer twice a day and nebulizer with albuterol and ipratropium 4 times a day when necessary and proair HFA 2 puffs when necessary. C/o shortness of breath , worse with exertion. Occasional Wheezing. Continue bronchodilator as before. 2. Lung nodule  She had CT chest in oct 2019, reported STABLE 1.3 CM GROUNDGLASS OPACITY RIGHT APEX. CONTINUED SURVEILLANCE CAN BE PERFORMED WITH CT IN 1 TO 2 YEARS. f/u  CTchest will be done in oct 2020 and f/u in office       Return in about 4 months (around 10/16/2020) for COPD, Ct chest f/u.       Paulo EDEN

## 2020-06-17 ENCOUNTER — TELEPHONE (OUTPATIENT)
Dept: FAMILY MEDICINE CLINIC | Age: 60
End: 2020-06-17

## 2020-06-22 ENCOUNTER — HOSPITAL ENCOUNTER (OUTPATIENT)
Dept: ULTRASOUND IMAGING | Age: 60
Discharge: HOME | End: 2020-06-24
Payer: MEDICARE

## 2020-06-22 PROCEDURE — 93880 EXTRACRANIAL BILAT STUDY: CPT

## 2020-06-24 ENCOUNTER — OFFICE VISIT (OUTPATIENT)
Dept: FAMILY MEDICINE CLINIC | Age: 60
End: 2020-06-24
Payer: MEDICARE

## 2020-06-24 VITALS
OXYGEN SATURATION: 97 % | SYSTOLIC BLOOD PRESSURE: 120 MMHG | BODY MASS INDEX: 23.92 KG/M2 | DIASTOLIC BLOOD PRESSURE: 74 MMHG | WEIGHT: 130 LBS | TEMPERATURE: 97.4 F | HEART RATE: 63 BPM | HEIGHT: 62 IN

## 2020-06-24 PROCEDURE — G8427 DOCREV CUR MEDS BY ELIG CLIN: HCPCS | Performed by: FAMILY MEDICINE

## 2020-06-24 PROCEDURE — G8420 CALC BMI NORM PARAMETERS: HCPCS | Performed by: FAMILY MEDICINE

## 2020-06-24 PROCEDURE — 99214 OFFICE O/P EST MOD 30 MIN: CPT | Performed by: FAMILY MEDICINE

## 2020-06-24 PROCEDURE — 1036F TOBACCO NON-USER: CPT | Performed by: FAMILY MEDICINE

## 2020-06-24 PROCEDURE — 3017F COLORECTAL CA SCREEN DOC REV: CPT | Performed by: FAMILY MEDICINE

## 2020-06-24 ASSESSMENT — ENCOUNTER SYMPTOMS
COUGH: 0
ABDOMINAL PAIN: 0
SHORTNESS OF BREATH: 0

## 2020-06-24 NOTE — PROGRESS NOTES
Subjective:      Patient ID: Madai Duong is a 62 y.o. female    Other   This is a new problem. Pertinent negatives include no abdominal pain, chest pain, chills, coughing, fever, rash or weakness. GI Problem   Primary symptoms do not include fever, abdominal pain or rash. The illness does not include chills. Here in follow up for diarrhea and emesis which occurred few months ago prompting surgery consultation. Labs done which were neg by surgery. .  Symptoms resolved currently. Seen by np in may where u/s breast was done and did have vitamin d level done recently. Here in follow up for that. Also had carotid u/s done. Does see pulmonary and rheumatology     would like hepatitis testing-not sure if needs vaccine    Review of Systems   Constitutional: Negative for chills and fever. Respiratory: Negative for cough and shortness of breath. Cardiovascular: Negative for chest pain. Gastrointestinal: Negative for abdominal pain. Skin: Negative for rash. Neurological: Negative for weakness.      Reviewed allergy, medical, social, surgical, family and med list changes and updated   Files--reviewed blood work with minimally elevated vitamin d and carotid u/s showing moderate narrowing bilaterally      Social History     Socioeconomic History    Marital status: Single     Spouse name: None    Number of children: None    Years of education: None    Highest education level: None   Occupational History    None   Social Needs    Financial resource strain: None    Food insecurity     Worry: None     Inability: None    Transportation needs     Medical: None     Non-medical: None   Tobacco Use    Smoking status: Former Smoker     Packs/day: 0.50     Years: 30.00     Pack years: 15.00     Last attempt to quit: 2015     Years since quittin.5    Smokeless tobacco: Never Used   Substance and Sexual Activity    Alcohol use: No    Drug use: No    Sexual activity: None   Lifestyle    Physical Pressure Father      Past Medical History:   Diagnosis Date    [facility] [address] [facility] ([facility] [address]     Arthritis     general joints    Cataracts, bilateral 05/03/2017    COPD (chronic obstructive pulmonary disease) (HCC)     Fracture of coccyx (HCC)     History of non-ST elevation myocardial infarction (NSTEMI) 3/17/2016    History of tobacco abuse     past smoker / quit > 2 yrs    HTN (hypertension)     past trx    Lung disease     Tendinitis of wrist     BIALTERAL     Objective:   /74   Pulse 63   Temp 97.4 °F (36.3 °C)   Ht 5' 2\" (1.575 m)   Wt 130 lb (59 kg)   SpO2 97%   BMI 23.78 kg/m²     Physical Exam  Neck:right  carotid bruits -none on left . No masses. No adenopathy. No thyroid asymmetry. Lungs:clear and equal breath sounds. No wheezes or rales. Heart:rate reg. No murmur. No gallops   Pulses:Radials 2+ equal              D.P  1+ equal  Extremities:no edema in either leg  Gen: In no acute distress  Abdomen; B.S present. Soft  Non tender. No hepatosplenomegaly. No masses   Assessment:       Diagnosis Orders   1. Ruptured silicone breast implant, initial encounter     2. Poisoning by vitamin D, accidental or unintentional, initial encounter  Vitamin D 25 Hydroxy   3. Bilateral carotid artery stenosis  Lipid Panel    Comprehensive Metabolic Panel    CBC Auto Differential   4.  Exposure to hepatitis  Hepatitis C Antibody    Hepatitis A Antibody, Total    Hepatitis B Surface Antibody         Plan:    hold otc vitamin d   Did not wish to pursue surgery consult with silicone implant   Fasting blood work soon and f/u after done -would like hepatitis testing

## 2020-06-26 DIAGNOSIS — Z20.5 EXPOSURE TO HEPATITIS: ICD-10-CM

## 2020-06-26 DIAGNOSIS — T45.2X1A POISONING BY VITAMIN D, ACCIDENTAL OR UNINTENTIONAL, INITIAL ENCOUNTER: ICD-10-CM

## 2020-06-26 DIAGNOSIS — I65.23 BILATERAL CAROTID ARTERY STENOSIS: ICD-10-CM

## 2020-06-26 LAB
ALBUMIN SERPL-MCNC: 4 G/DL (ref 3.5–4.6)
ALP BLD-CCNC: 59 U/L (ref 40–130)
ALT SERPL-CCNC: 15 U/L (ref 0–33)
ANION GAP SERPL CALCULATED.3IONS-SCNC: 10 MEQ/L (ref 9–15)
AST SERPL-CCNC: 24 U/L (ref 0–35)
BASOPHILS ABSOLUTE: 0 K/UL (ref 0–0.2)
BASOPHILS RELATIVE PERCENT: 0.9 %
BILIRUB SERPL-MCNC: 0.4 MG/DL (ref 0.2–0.7)
BUN BLDV-MCNC: 7 MG/DL (ref 8–23)
CALCIUM SERPL-MCNC: 9.7 MG/DL (ref 8.5–9.9)
CHLORIDE BLD-SCNC: 102 MEQ/L (ref 95–107)
CHOLESTEROL, TOTAL: 136 MG/DL (ref 0–199)
CO2: 30 MEQ/L (ref 20–31)
CREAT SERPL-MCNC: 0.73 MG/DL (ref 0.5–0.9)
EOSINOPHILS ABSOLUTE: 0.2 K/UL (ref 0–0.7)
EOSINOPHILS RELATIVE PERCENT: 5.6 %
GFR AFRICAN AMERICAN: >60
GFR NON-AFRICAN AMERICAN: >60
GLOBULIN: 2.7 G/DL (ref 2.3–3.5)
GLUCOSE BLD-MCNC: 77 MG/DL (ref 70–99)
HCT VFR BLD CALC: 38.5 % (ref 37–47)
HDLC SERPL-MCNC: 48 MG/DL (ref 40–59)
HEMOGLOBIN: 12.4 G/DL (ref 12–16)
HEPATITIS C ANTIBODY INTERPRETATION: NORMAL
LDL CHOLESTEROL CALCULATED: 68 MG/DL (ref 0–129)
LYMPHOCYTES ABSOLUTE: 1.8 K/UL (ref 1–4.8)
LYMPHOCYTES RELATIVE PERCENT: 41.5 %
MCH RBC QN AUTO: 31.2 PG (ref 27–31.3)
MCHC RBC AUTO-ENTMCNC: 32.2 % (ref 33–37)
MCV RBC AUTO: 96.8 FL (ref 82–100)
MONOCYTES ABSOLUTE: 0.2 K/UL (ref 0.2–0.8)
MONOCYTES RELATIVE PERCENT: 5.5 %
NEUTROPHILS ABSOLUTE: 2 K/UL (ref 1.4–6.5)
NEUTROPHILS RELATIVE PERCENT: 46.5 %
PDW BLD-RTO: 14.8 % (ref 11.5–14.5)
PLATELET # BLD: 161 K/UL (ref 130–400)
POTASSIUM SERPL-SCNC: 4.7 MEQ/L (ref 3.4–4.9)
RBC # BLD: 3.98 M/UL (ref 4.2–5.4)
SODIUM BLD-SCNC: 142 MEQ/L (ref 135–144)
TOTAL PROTEIN: 6.7 G/DL (ref 6.3–8)
TRIGL SERPL-MCNC: 101 MG/DL (ref 0–150)
VITAMIN D 25-HYDROXY: 95.6 NG/ML (ref 30–100)
WBC # BLD: 4.2 K/UL (ref 4.8–10.8)

## 2020-06-27 LAB — HAV AB SERPL IA-ACNC: NEGATIVE

## 2020-07-08 ENCOUNTER — TELEPHONE (OUTPATIENT)
Dept: PULMONOLOGY | Age: 60
End: 2020-07-08

## 2020-08-11 RX ORDER — ALBUTEROL SULFATE 2.5 MG/3ML
2.5 SOLUTION RESPIRATORY (INHALATION) EVERY 4 HOURS PRN
Qty: 120 EACH | Refills: 5 | Status: CANCELLED | OUTPATIENT
Start: 2020-08-11

## 2020-08-13 ENCOUNTER — TELEPHONE (OUTPATIENT)
Dept: FAMILY MEDICINE CLINIC | Age: 60
End: 2020-08-13

## 2020-08-13 RX ORDER — ALBUTEROL SULFATE 2.5 MG/3ML
SOLUTION RESPIRATORY (INHALATION)
Qty: 300 ML | Refills: 0 | Status: SHIPPED | OUTPATIENT
Start: 2020-08-13 | End: 2020-11-25

## 2020-08-13 NOTE — TELEPHONE ENCOUNTER
Pt's call was transf from scheduling dept to review when last tetanus was. Pt informed last one, per pt's chart was in 2003    Pt states she went to [facility] in 2015 same day she went and had another one done. She also talked about going to [facility] but there was not a nurse to do it. She states when she did have it done, she was told she was due at age 56 and would never need one again. Pt also asked about lab results post last visit 6/24/2020. Per provider's result note, f/u appt needed to be scheduled to discuss. Pt pref ov not vv.  Pt scheduled 8/14/2020 to review w/ provider all of the above message and had no further questions post scheduling pt for f/u.

## 2020-08-14 ENCOUNTER — OFFICE VISIT (OUTPATIENT)
Dept: FAMILY MEDICINE CLINIC | Age: 60
End: 2020-08-14
Payer: MEDICARE

## 2020-08-14 VITALS
RESPIRATION RATE: 12 BRPM | BODY MASS INDEX: 23.19 KG/M2 | HEIGHT: 62 IN | SYSTOLIC BLOOD PRESSURE: 111 MMHG | TEMPERATURE: 99 F | WEIGHT: 126 LBS | OXYGEN SATURATION: 97 % | DIASTOLIC BLOOD PRESSURE: 67 MMHG

## 2020-08-14 DIAGNOSIS — Z20.5 EXPOSURE TO HEPATITIS B: ICD-10-CM

## 2020-08-14 LAB — HBV SURFACE AB TITR SER: NORMAL MIU/ML

## 2020-08-14 PROCEDURE — 90471 IMMUNIZATION ADMIN: CPT | Performed by: FAMILY MEDICINE

## 2020-08-14 PROCEDURE — 90632 HEPA VACCINE ADULT IM: CPT | Performed by: FAMILY MEDICINE

## 2020-08-14 PROCEDURE — 99213 OFFICE O/P EST LOW 20 MIN: CPT | Performed by: FAMILY MEDICINE

## 2020-08-14 PROCEDURE — G8427 DOCREV CUR MEDS BY ELIG CLIN: HCPCS | Performed by: FAMILY MEDICINE

## 2020-08-14 PROCEDURE — G8420 CALC BMI NORM PARAMETERS: HCPCS | Performed by: FAMILY MEDICINE

## 2020-08-14 PROCEDURE — 1036F TOBACCO NON-USER: CPT | Performed by: FAMILY MEDICINE

## 2020-08-14 PROCEDURE — 3017F COLORECTAL CA SCREEN DOC REV: CPT | Performed by: FAMILY MEDICINE

## 2020-08-14 ASSESSMENT — ENCOUNTER SYMPTOMS
VOMITING: 0
COUGH: 0

## 2020-08-14 NOTE — PROGRESS NOTES
Subjective:      Patient ID: Jennifer Rocha is a 62 y.o. female    HPI  Here in follow up from recent fasting blood work. Review of Systems   Constitutional: Negative for chills and fever. Respiratory: Negative for cough. Cardiovascular: Negative for chest pain. Gastrointestinal: Negative for vomiting. Skin: Negative for rash. Neurological: Negative for weakness.      Reviewed allergy, medical, social, surgical, family and med list changes and updated   Files--reviewed blood work with low wbc      Social History     Socioeconomic History    Marital status: Single     Spouse name: Not on file    Number of children: Not on file    Years of education: Not on file    Highest education level: Not on file   Occupational History    Not on file   Social Needs    Financial resource strain: Not on file    Food insecurity     Worry: Not on file     Inability: Not on file    Transportation needs     Medical: Not on file     Non-medical: Not on file   Tobacco Use    Smoking status: Former Smoker     Packs/day: 0.50     Years: 30.00     Pack years: 15.00     Last attempt to quit: 2015     Years since quittin.6    Smokeless tobacco: Never Used   Substance and Sexual Activity    Alcohol use: No    Drug use: No    Sexual activity: Not on file   Lifestyle    Physical activity     Days per week: Not on file     Minutes per session: Not on file    Stress: Not on file   Relationships    Social connections     Talks on phone: Not on file     Gets together: Not on file     Attends Holiness service: Not on file     Active member of club or organization: Not on file     Attends meetings of clubs or organizations: Not on file     Relationship status: Not on file    Intimate partner violence     Fear of current or ex partner: Not on file     Emotionally abused: Not on file     Physically abused: Not on file     Forced sexual activity: Not on file   Other Topics Concern    Not on file   Social History Narrative    Not on file     Current Outpatient Medications   Medication Sig Dispense Refill    ipratropium (ATROVENT) 0.02 % nebulizer solution INHALE 1 VIAL BY NEBULIZER 4 TIMES DAILY 300 mL 0    albuterol (PROVENTIL) (2.5 MG/3ML) 0.083% nebulizer solution USE ONE VIAL IN NEBULIZER EVERY 4 HOURS AS NEEDED FOR WHEEZING 300 mL 0    Handicap Placard MISC by Does not apply route 5 yrs 1 each 0    Lifitegrast (XIIDRA) 5 % SOLN Apply to eye      Arformoterol Tartrate (BROVANA) 15 MCG/2ML NEBU Take 2 mLs by nebulization 2 times daily use 1 vial in nebulizer twice a day DX COPD J44.9 360 mL 3    albuterol sulfate HFA (PROVENTIL HFA) 108 (90 Base) MCG/ACT inhaler Inhale 2 puffs into the lungs every 6 hours as needed for Wheezing 1 Inhaler 3    Nebulizers (AIRIAL COMPACT MINI NEBULIZER) MISC PLEASE PROVIDE PATIENT WITH [employer] PORTABLE NEBULIZER UNIT. HEALTH CARE SOLUTIONS 1 each 0    Carboxymethylcell-Hypromellose (GENTEAL OP) Apply to eye      Respiratory Therapy Supplies (NEBULIZER AIR TUBE/PLUGS) MISC Nebulizer supply, cup and tubing 1 each 0    POTASSIUM PO Take by mouth      vitamin E 1000 units capsule Take 1,000 Units by mouth daily      magnesium 30 MG tablet Take 30 mg by mouth daily      vitamin B-12 (CYANOCOBALAMIN) 100 MCG tablet Take 50 mcg by mouth daily       No current facility-administered medications for this visit.       Family History   Problem Relation Age of Onset    Heart Disease Father     High Blood Pressure Father      Past Medical History:   Diagnosis Date    Acute [address] failure ([facility] [address]     Arthritis     general joints    Cataracts, bilateral 05/03/2017    COPD (chronic obstructive pulmonary disease) (HCC)     Fracture of coccyx (HCC)     History of non-ST elevation myocardial infarction (NSTEMI) 3/17/2016    History of tobacco abuse     past smoker / quit > 2 yrs    HTN (hypertension)     past trx    Lung disease     Tendinitis of wrist     BIALTERAL Objective:   /67   Temp 99 °F (37.2 °C)   Resp 12   Ht 5' 2\" (1.575 m)   Wt 126 lb (57.2 kg)   SpO2 97%   BMI 23.05 kg/m²     Physical Exam  Neck:no carotid bruits. No masses. No adenopathy. No thyroid asymmetry. Lungs:clear and equal breath sounds. No wheezes or rales. Heart:rate reg. No murmur. No gallops   Pulses:Radials 2+ equal               Poster tib 1+ equal  Extremities:no edema in either leg  Gen: In no acute distress  Abdomen; B.S present. Soft  Non tender. No hepatosplenomegaly. No masses   Assessment:       Diagnosis Orders   1. Bilateral carotid artery stenosis     2. Leukopenia, unspecified type     3.  Exposure to hepatitis B  Hepatitis B Surface Antibody         Plan:      Orders Placed This Encounter   Procedures    Hep A Vaccine Adult (HAVRIX)       Patient declined lipids lowering agent  Fasting blood work in 6 months and f/u after done

## 2020-08-17 ENCOUNTER — TELEPHONE (OUTPATIENT)
Dept: FAMILY MEDICINE CLINIC | Age: 60
End: 2020-08-17

## 2020-08-17 NOTE — TELEPHONE ENCOUNTER
Pt called in requesting to speak with Dr karan Oneill. She is ok and willing to have screenings completed, but would feel safer if Dr could do them instead of her going to another location to complete screenings. She is requesting a phone call to advise.   [phone]

## 2020-08-20 ENCOUNTER — TELEPHONE (OUTPATIENT)
Dept: FAMILY MEDICINE CLINIC | Age: 60
End: 2020-08-20

## 2020-08-25 ENCOUNTER — TELEPHONE (OUTPATIENT)
Dept: FAMILY MEDICINE CLINIC | Age: 60
End: 2020-08-25

## 2020-08-26 ENCOUNTER — TELEPHONE (OUTPATIENT)
Dept: FAMILY MEDICINE CLINIC | Age: 60
End: 2020-08-26

## 2020-08-26 NOTE — TELEPHONE ENCOUNTER
Tried to call the patient several times but she did not  and there is not machine to leave a message. I am working on the referral. The dermatologist she went to see is out of network. I am waiting on office notes from Dermatologist office.

## 2020-09-22 ENCOUNTER — TELEPHONE (OUTPATIENT)
Dept: FAMILY MEDICINE CLINIC | Age: 60
End: 2020-09-22

## 2020-09-22 NOTE — TELEPHONE ENCOUNTER
Tried calling patient to let her know she is clear to get her flu vaccine from [facility]. Phone kept ringing no voicemail set up.

## 2020-09-23 NOTE — TELEPHONE ENCOUNTER
Theodore from [facility] calls asking for a referral to Dr. [facility]. procedure: [id number], [id number]  Dx=[phone]. [phone] please fax to [phone] Back Pain

## 2020-10-07 ENCOUNTER — TELEPHONE (OUTPATIENT)
Dept: FAMILY MEDICINE CLINIC | Age: 60
End: 2020-10-07

## 2020-10-08 ENCOUNTER — OFFICE VISIT (OUTPATIENT)
Dept: FAMILY MEDICINE CLINIC | Age: 60
End: 2020-10-08
Payer: MEDICARE

## 2020-10-08 VITALS
HEIGHT: 62 IN | OXYGEN SATURATION: 96 % | TEMPERATURE: 98.4 F | BODY MASS INDEX: 24.4 KG/M2 | WEIGHT: 132.6 LBS | HEART RATE: 83 BPM | SYSTOLIC BLOOD PRESSURE: 130 MMHG | DIASTOLIC BLOOD PRESSURE: 66 MMHG

## 2020-10-08 PROCEDURE — G8420 CALC BMI NORM PARAMETERS: HCPCS | Performed by: NURSE PRACTITIONER

## 2020-10-08 PROCEDURE — G8427 DOCREV CUR MEDS BY ELIG CLIN: HCPCS | Performed by: NURSE PRACTITIONER

## 2020-10-08 PROCEDURE — G8484 FLU IMMUNIZE NO ADMIN: HCPCS | Performed by: NURSE PRACTITIONER

## 2020-10-08 PROCEDURE — 99213 OFFICE O/P EST LOW 20 MIN: CPT | Performed by: NURSE PRACTITIONER

## 2020-10-08 PROCEDURE — 3017F COLORECTAL CA SCREEN DOC REV: CPT | Performed by: NURSE PRACTITIONER

## 2020-10-08 PROCEDURE — 1036F TOBACCO NON-USER: CPT | Performed by: NURSE PRACTITIONER

## 2020-10-08 SDOH — ECONOMIC STABILITY: FOOD INSECURITY: WITHIN THE PAST 12 MONTHS, THE FOOD YOU BOUGHT JUST DIDN'T LAST AND YOU DIDN'T HAVE MONEY TO GET MORE.: NEVER TRUE

## 2020-10-08 SDOH — ECONOMIC STABILITY: TRANSPORTATION INSECURITY
IN THE PAST 12 MONTHS, HAS THE LACK OF TRANSPORTATION KEPT YOU FROM MEDICAL APPOINTMENTS OR FROM GETTING MEDICATIONS?: NO

## 2020-10-08 SDOH — ECONOMIC STABILITY: INCOME INSECURITY: HOW HARD IS IT FOR YOU TO PAY FOR THE VERY BASICS LIKE FOOD, HOUSING, MEDICAL CARE, AND HEATING?: NOT HARD AT ALL

## 2020-10-08 SDOH — ECONOMIC STABILITY: FOOD INSECURITY: WITHIN THE PAST 12 MONTHS, YOU WORRIED THAT YOUR FOOD WOULD RUN OUT BEFORE YOU GOT MONEY TO BUY MORE.: NEVER TRUE

## 2020-10-08 SDOH — ECONOMIC STABILITY: TRANSPORTATION INSECURITY
IN THE PAST 12 MONTHS, HAS LACK OF TRANSPORTATION KEPT YOU FROM MEETINGS, WORK, OR FROM GETTING THINGS NEEDED FOR DAILY LIVING?: NO

## 2020-10-08 NOTE — PATIENT INSTRUCTIONS
streaks leading from the rash. ? Pus draining from the rash. ? A fever.     · You have crusting or oozing sores.     · You have joint aches or body aches along with your rash. Watch closely for changes in your health, and be sure to contact your doctor if:    · Your rash does not clear up after 2 to 3 weeks of home treatment.     · Itching interferes with your sleep or daily activities. Where can you learn more? Go to https://[email or web address][email or web address]. org and sign in to your [employer] account. Enter J020 in the My [employer] box to learn more about \"Atopic Dermatitis: Care Instructions. \"     If you do not have an account, please click on the \"Sign Up Now\" link. Current as of: July 2, 2020               Content Version: 12.6  © 2532-0417 [employer]. Care instructions adapted under license by [facility]. If you have questions about a medical condition or this instruction, always ask your healthcare professional. [place] [address] any warranty or liability for your use of this information.

## 2020-10-15 ENCOUNTER — HOSPITAL ENCOUNTER (OUTPATIENT)
Dept: CT IMAGING | Age: 60
Discharge: HOME | End: 2020-10-17
Payer: MEDICARE

## 2020-10-15 PROCEDURE — 71250 CT THORAX DX C-: CPT

## 2020-10-20 ENCOUNTER — OFFICE VISIT (OUTPATIENT)
Dept: PULMONOLOGY | Age: 60
End: 2020-10-20
Payer: MEDICARE

## 2020-10-20 VITALS
DIASTOLIC BLOOD PRESSURE: 60 MMHG | OXYGEN SATURATION: 98 % | HEART RATE: 76 BPM | TEMPERATURE: 98.8 F | HEIGHT: 62 IN | SYSTOLIC BLOOD PRESSURE: 110 MMHG | WEIGHT: 133 LBS | RESPIRATION RATE: 16 BRPM | BODY MASS INDEX: 24.48 KG/M2

## 2020-10-20 PROCEDURE — G8484 FLU IMMUNIZE NO ADMIN: HCPCS | Performed by: INTERNAL MEDICINE

## 2020-10-20 PROCEDURE — G8420 CALC BMI NORM PARAMETERS: HCPCS | Performed by: INTERNAL MEDICINE

## 2020-10-20 PROCEDURE — 99214 OFFICE O/P EST MOD 30 MIN: CPT | Performed by: INTERNAL MEDICINE

## 2020-10-20 PROCEDURE — 3023F SPIROM DOC REV: CPT | Performed by: INTERNAL MEDICINE

## 2020-10-20 PROCEDURE — 1036F TOBACCO NON-USER: CPT | Performed by: INTERNAL MEDICINE

## 2020-10-20 PROCEDURE — 3017F COLORECTAL CA SCREEN DOC REV: CPT | Performed by: INTERNAL MEDICINE

## 2020-10-20 PROCEDURE — G8427 DOCREV CUR MEDS BY ELIG CLIN: HCPCS | Performed by: INTERNAL MEDICINE

## 2020-10-20 PROCEDURE — G8926 SPIRO NO PERF OR DOC: HCPCS | Performed by: INTERNAL MEDICINE

## 2020-10-20 ASSESSMENT — ENCOUNTER SYMPTOMS
EYE DISCHARGE: 0
WHEEZING: 1
NAUSEA: 0
SORE THROAT: 0
ABDOMINAL PAIN: 0
EYE ITCHING: 0
VOMITING: 0
RHINORRHEA: 0
CHEST TIGHTNESS: 0
VOICE CHANGE: 0
DIARRHEA: 0
TROUBLE SWALLOWING: 0
SHORTNESS OF BREATH: 1
COUGH: 0
SINUS PRESSURE: 0

## 2020-10-20 NOTE — PROGRESS NOTES
Subjective:     Morena Abbott is a 56 y.o. female who complains today of:     Chief Complaint   Patient presents with    Follow-up     f/u for Ct results. HPI    Patient had CT chest on 10/15/20 and came for f/u   Patient is using Brovana nebulizer twice a day and nebulizer with albuterol and ipratropium 4 times a day when necessary and proair HFA 2 puffs when necessary. C/o shortness of breath  with exertion. C/o Occasional Wheezing   No Cough with  Sputum  No Hemoptysis  No Chest tightness   No Chest pain with radiation  or pleuritic pain  No Fever or chills. No Rhinorrhea and postnasal drip. Allergies:  Pcn [penicillins]; Corticosteroids; Lidocaine; Contrast [iodides]; Glycerin; Naprosyn [naproxen]; and Valium [diazepam]  Past Medical History:   Diagnosis Date    Acute respiratory failure ([facility] [address]     Arthritis     general joints    Cataracts, bilateral 05/03/2017    COPD (chronic obstructive pulmonary disease) (HCC)     Fracture of coccyx (HCC)     History of non-ST elevation myocardial infarction (NSTEMI) 3/17/2016    History of tobacco abuse     past smoker / quit > 2 yrs    HTN (hypertension)     past trx    Lung disease     Tendinitis of wrist     BIALTERAL     Past Surgical History:   Procedure Laterality Date    BREAST SURGERY      bilateral implants    CARDIAC CATHETERIZATION  3/2016    CATARACT REMOVAL Left 10/12/15     DR. SANTIAGO    CATARACT REMOVAL WITH IMPLANT Right 11/2/15     DR. [facility]    CHOLECYSTECTOMY, LAPAROSCOPIC N/A 7/14/2017    CHOLECYSTECTOMY LAPAROSCOPIC - CHOLANGIOGRAM POSS OPEN performed by Spring Parikh MD at [address] [place] [address]  2005    EYE SURGERY      [place] with IOL OU    TUBAL LIGATION       Family History   Problem Relation Age of Onset    Heart Disease Father     High Blood Pressure Father      Social History     Socioeconomic History    Marital status: Single     Spouse name: Not on file    Number of children: Not on file    Years of education: Not on file    Highest education level: Not on file   Occupational History    Not on file   Social Needs    Financial resource strain: Not hard at all    Food insecurity     Worry: Never true     Inability: Never true   [employer] needs     Medical: No     Non-medical: No   Tobacco Use    Smoking status: Former Smoker     Packs/day: 0.50     Years: 30.00     Pack years: 15.00     Last attempt to quit: 2015     Years since quittin.8    Smokeless tobacco: Never Used   Substance and Sexual Activity    Alcohol use: No    Drug use: No    Sexual activity: Not on file   Lifestyle    Physical activity     Days per week: Not on file     Minutes per session: Not on file    Stress: Not on file   Relationships    Social connections     Talks on phone: Not on file     Gets together: Not on file     Attends Pentecostal service: Not on file     Active member of club or organization: Not on file     Attends meetings of clubs or organizations: Not on file     Relationship status: Not on file    Intimate partner violence     Fear of current or ex partner: Not on file     Emotionally abused: Not on file     Physically abused: Not on file     Forced sexual activity: Not on file   Other Topics Concern    Not on file   Social History Narrative    Not on file         Review of Systems   Constitutional: Negative for chills, diaphoresis, fatigue and fever. HENT: Negative for congestion, mouth sores, nosebleeds, postnasal drip, rhinorrhea, sinus pressure, sneezing, sore throat, trouble swallowing and voice change. Eyes: Negative for discharge, itching and visual disturbance. Respiratory: Positive for shortness of breath and wheezing. Negative for cough and chest tightness. Cardiovascular: Negative for chest pain, palpitations and leg swelling. Gastrointestinal: Negative for abdominal pain, diarrhea, nausea and vomiting.    Genitourinary: Negative for difficulty urinating and hematuria. Musculoskeletal: Negative for arthralgias, joint swelling and myalgias. Skin: Negative for rash. Allergic/Immunologic: Negative for environmental allergies and food allergies. Neurological: Negative for dizziness, tremors, weakness and headaches. Psychiatric/Behavioral: Negative for behavioral problems and sleep disturbance.         :     Vitals:    10/20/20 0817   BP: 110/60   Pulse: 76   Resp: 16   Temp: 98.8 °F (37.1 °C)   TempSrc: Temporal   SpO2: 98%   Weight: 133 lb (60.3 kg)   Height: 5' 2\" (1.575 m)     Wt Readings from Last 3 Encounters:   10/20/20 133 lb (60.3 kg)   10/08/20 132 lb 9.6 oz (60.1 kg)   08/14/20 126 lb (57.2 kg)         Physical Exam  Constitutional:       General: She is not in acute distress. Appearance: She is well-developed. She is not diaphoretic. HENT:      Head: Normocephalic and atraumatic. Nose: Nose normal.   Eyes:      Pupils: Pupils are equal, round, and reactive to light. Neck:      Thyroid: No thyromegaly. Vascular: No JVD. Trachea: No tracheal deviation. Cardiovascular:      Rate and Rhythm: Normal rate and regular rhythm. Heart sounds: No murmur. No friction rub. No gallop. Pulmonary:      Effort: No respiratory distress. Breath sounds: No wheezing or rales. Comments: diminished Breath sound bilaterally. Chest:      Chest wall: No tenderness. Abdominal:      General: There is no distension. Tenderness: There is no abdominal tenderness. There is no rebound. Musculoskeletal: Normal range of motion. Lymphadenopathy:      Cervical: No cervical adenopathy. Skin:     General: Skin is warm and dry. Neurological:      Mental Status: She is alert and oriented to person, place, and time.       Coordination: Coordination normal.         Current Outpatient Medications   Medication Sig Dispense Refill    B Complex Vitamins (VITAMIN-B COMPLEX PO) Take 1 capsule by mouth daily      ipratropium (ATROVENT) 0.02 % nebulizer solution INHALE 1 VIAL BY NEBULIZER 4 TIMES DAILY 300 mL 0    albuterol (PROVENTIL) (2.5 MG/3ML) 0.083% nebulizer solution USE ONE VIAL IN NEBULIZER EVERY 4 HOURS AS NEEDED FOR WHEEZING 300 mL 0    Handicap Placard [facility] by Does not apply route 5 yrs 1 each 0    Lifitegrast (XIIDRA) 5 % SOLN Apply to eye      Arformoterol Tartrate (BROVANA) 15 MCG/2ML NEBU Take 2 mLs by nebulization 2 times daily use 1 vial in nebulizer twice a day DX COPD J44.9 360 mL 3    albuterol sulfate HFA (PROVENTIL HFA) 108 (90 Base) MCG/ACT inhaler Inhale 2 puffs into the lungs every 6 hours as needed for Wheezing 1 Inhaler 3    Nebulizers (AIRIAL COMPACT MINI NEBULIZER) MISC PLEASE PROVIDE PATIENT WITH INNOSPIRE PORTABLE NEBULIZER UNIT. HEALTH CARE SOLUTIONS 1 each 0    Carboxymethylcell-Hypromellose (GENTEAL OP) Apply to eye      Respiratory Therapy Supplies (NEBULIZER AIR TUBE/PLUGS) MISC Nebulizer supply, cup and tubing 1 each 0    POTASSIUM PO Take by mouth      vitamin E 1000 units capsule Take 1,000 Units by mouth daily      magnesium 30 MG tablet Take 30 mg by mouth daily      vitamin B-12 (CYANOCOBALAMIN) 100 MCG tablet Take 50 mcg by mouth daily       No current facility-administered medications for this visit. No results found for this or any previous visit.]  Results for orders placed during the hospital encounter of 05/03/17   XR Chest Portable    Narrative PORTABLE CHEST: 5/3/2017    CLINICAL HISTORY: Shortness of breath. COMPARISON: 12/3/2015, and chest CT 1/17/2017. A portable upright AP radiograph of the chest was obtained. FINDINGS:    A shallow inspiration is present without significant infiltrate identified. The cardiac and mediastinal silhouettes appear within normal limits for the patient's age group and technique. There is no significant pleural effusion, vascular congestion, pneumothorax, or displaced fractures identified.  Calcified prosthetic breast implants are again noted. Impression POOR INSPIRATION WITHOUT EVIDENCE OF ACTIVE CARDIOPULMONARY DISEASE.           ]  No results found for this or any previous visit.]  Results for orders placed during the hospital encounter of 10/15/20   CT CHEST WO CONTRAST    Narrative EXAMINATION: CT CHEST WITHOUT CONTRAST    CLINICAL HISTORY:R91.1 Lung nodule ICD10, follow-up groundglass opacity right apex. COMPARISONS: None available. TECHNIQUE: TECHNIQUE: Contiguous axial images were obtained through the chest without contrast. Coronal and sagittal reformations were made. FINDINGS:  Lungs: There is a stable groundglass opacity in the right apex measuring approximately 13 mm. No significant interval change since 8/18. Recommend continued surveillance with CT in one to 2 years. There is a 3.5 mm nodule in the lingula, adjacent to the major fissure, series 3, image 133. This also is unchanged. No other lung nodule or acute infiltrate. Ascending thoracic aorta is at the upper limits of normal in size measuring 3.9 cm in transverse diameter, unchanged. There are atherosclerotic calcifications in the aorta and coronary arteries. No significant mediastinal or hilar or axillary   lymphadenopathy. No pericardial effusion. No pleural effusion. No cardiac enlargement. No pneumothorax. 8 mm coarse calcification with peripheral calcification within the right lobe liver near the dome of the liver. This is unchanged dating back to 8/18. Bones are intact. Peripherally calcified bilateral breast implants. Impression  STABLE 1.3 CM GROUNDGLASS OPACITY RIGHT APEX. STABLE 3.5 MM NODULE LINGULA. NO ACUTE PROCESS. RECOMMEND FOLLOW UP CT SCAN IN 1 TO 2 YEARS. All CT scans at this facility use dose modulation, iterative reconstruction, and/or weight based dosing when appropriate to reduce radiation dose to as low as reasonably achievable. Assessment/Plan:     1.  Chronic obstructive pulmonary disease, unspecified COPD type ([facility] [address]  She  is using Brovana nebulizer twice a day and nebulizer with albuterol and ipratropium 4 times a day when necessary and proair HFA 2 puffs when necessary. C/o shortness of breath  with exertion. C/o Occasional Wheezing . No Cough with Sputum. Bronchodilator therapy as before    2. Lung nodule  She  had CT chest on 10/15/20 and came for f/u. I reviewed CT chest with the patient. CT chest reported stable 1.3 cm groundglass opacity in the right apex. Stable 3.5 mm nodule in lingula. No acute process recommend follow-up CT chest in 1 to 2 years. Return in about 6 months (around 4/20/2021) for COPD, lung nodule f/u.       Rick Ojeda MD

## 2020-11-25 RX ORDER — ALBUTEROL SULFATE 2.5 MG/3ML
SOLUTION RESPIRATORY (INHALATION)
Qty: 300 ML | Refills: 0 | Status: SHIPPED | OUTPATIENT
Start: 2020-11-25 | End: 2021-05-25

## 2020-12-18 DIAGNOSIS — J44.9 CHRONIC OBSTRUCTIVE PULMONARY DISEASE, UNSPECIFIED COPD TYPE (HCC): ICD-10-CM

## 2020-12-18 RX ORDER — ARFORMOTEROL TARTRATE 15 UG/2ML
15 SOLUTION RESPIRATORY (INHALATION) 2 TIMES DAILY
Qty: 360 ML | Refills: 3 | Status: SHIPPED | OUTPATIENT
Start: 2020-12-18 | End: 2021-03-29 | Stop reason: SDUPTHER

## 2021-01-20 ENCOUNTER — TELEPHONE (OUTPATIENT)
Dept: FAMILY MEDICINE CLINIC | Age: 61
End: 2021-01-20

## 2021-01-20 NOTE — TELEPHONE ENCOUNTER
Patient wanted you to know she was at [facility] yesterday and 3 boxes of baking soda (their brand) fell off shelf and hit her in the head. She believes sha has a concussion, but doesn't not need medical attention. Just would like you to be informed.

## 2021-02-15 ENCOUNTER — TELEPHONE (OUTPATIENT)
Dept: PULMONOLOGY | Age: 61
End: 2021-02-15

## 2021-02-15 NOTE — TELEPHONE ENCOUNTER
Pt called stating her insurance wants an order explaining why she requires a nebulizer.  Please advise, thanks  L/V 10/20/2020  F/U 4/27/2021

## 2021-02-17 ENCOUNTER — TELEPHONE (OUTPATIENT)
Dept: FAMILY MEDICINE CLINIC | Age: 61
End: 2021-02-17

## 2021-02-17 ENCOUNTER — OFFICE VISIT (OUTPATIENT)
Dept: FAMILY MEDICINE CLINIC | Age: 61
End: 2021-02-17
Payer: MEDICARE

## 2021-02-17 VITALS
RESPIRATION RATE: 14 BRPM | SYSTOLIC BLOOD PRESSURE: 100 MMHG | HEIGHT: 62 IN | OXYGEN SATURATION: 96 % | TEMPERATURE: 97.9 F | WEIGHT: 137.6 LBS | DIASTOLIC BLOOD PRESSURE: 70 MMHG | HEART RATE: 73 BPM | BODY MASS INDEX: 25.32 KG/M2

## 2021-02-17 DIAGNOSIS — J44.9 CHRONIC OBSTRUCTIVE PULMONARY DISEASE, UNSPECIFIED COPD TYPE (HCC): ICD-10-CM

## 2021-02-17 DIAGNOSIS — D72.819 LEUKOPENIA, UNSPECIFIED TYPE: ICD-10-CM

## 2021-02-17 DIAGNOSIS — Z13.6 SCREENING FOR ABDOMINAL AORTIC ANEURYSM: ICD-10-CM

## 2021-02-17 DIAGNOSIS — Z87.891 HISTORY OF SMOKING: ICD-10-CM

## 2021-02-17 DIAGNOSIS — Z23 NEED FOR HEPATITIS A VACCINATION: Primary | ICD-10-CM

## 2021-02-17 DIAGNOSIS — I65.23 BILATERAL CAROTID ARTERY STENOSIS: ICD-10-CM

## 2021-02-17 PROCEDURE — G8419 CALC BMI OUT NRM PARAM NOF/U: HCPCS | Performed by: FAMILY MEDICINE

## 2021-02-17 PROCEDURE — G8926 SPIRO NO PERF OR DOC: HCPCS | Performed by: FAMILY MEDICINE

## 2021-02-17 PROCEDURE — 3017F COLORECTAL CA SCREEN DOC REV: CPT | Performed by: FAMILY MEDICINE

## 2021-02-17 PROCEDURE — 3023F SPIROM DOC REV: CPT | Performed by: FAMILY MEDICINE

## 2021-02-17 PROCEDURE — 1036F TOBACCO NON-USER: CPT | Performed by: FAMILY MEDICINE

## 2021-02-17 PROCEDURE — G8482 FLU IMMUNIZE ORDER/ADMIN: HCPCS | Performed by: FAMILY MEDICINE

## 2021-02-17 PROCEDURE — 99213 OFFICE O/P EST LOW 20 MIN: CPT | Performed by: FAMILY MEDICINE

## 2021-02-17 PROCEDURE — G8427 DOCREV CUR MEDS BY ELIG CLIN: HCPCS | Performed by: FAMILY MEDICINE

## 2021-02-17 ASSESSMENT — PATIENT HEALTH QUESTIONNAIRE - PHQ9
SUM OF ALL RESPONSES TO PHQ QUESTIONS 1-9: 0
SUM OF ALL RESPONSES TO PHQ QUESTIONS 1-9: 0

## 2021-02-17 ASSESSMENT — ENCOUNTER SYMPTOMS
DIARRHEA: 0
VOMITING: 0

## 2021-02-17 NOTE — PROGRESS NOTES
Subjective:      Patient ID: Nik Harris is a 56 y.o. female    Other  This is a chronic problem. The current episode started more than 1 month ago. Pertinent negatives include no chills, rash, vomiting or weakness. Here in follow up for lipids and carotid disease and leucopenia  and immunization update. No new meds. No weight changes since last time. Former heavy smoker-would like to get aaa screening done. Review of Systems   Constitutional: Negative for chills. Cardiovascular: Negative for leg swelling. Gastrointestinal: Negative for diarrhea and vomiting. Skin: Negative for rash. Neurological: Negative for weakness.      Reviewed allergy, medical, social, surgical, family and med list changes and updated   Files     Social History     Socioeconomic History    Marital status: Single     Spouse name: None    Number of children: None    Years of education: None    Highest education level: None   Occupational History    None   Social Needs    Financial resource strain: Not hard at all   Francisca-Justin insecurity     Worry: Never true     Inability: Never true    Transportation needs     Medical: No     Non-medical: No   Tobacco Use    Smoking status: Former Smoker     Packs/day: 0.50     Years: 30.00     Pack years: 15.00     Quit date: 2015     Years since quittin.2    Smokeless tobacco: Never Used   Substance and Sexual Activity    Alcohol use: No    Drug use: No    Sexual activity: None   Lifestyle    Physical activity     Days per week: None     Minutes per session: None    Stress: None   Relationships    Social connections     Talks on phone: None     Gets together: None     Attends Confucianist service: None     Active member of club or organization: None     Attends meetings of clubs or organizations: None     Relationship status: None    Intimate partner violence     Fear of current or ex partner: None     Emotionally abused: None     Physically abused: None     Forced sexual activity: None   Other Topics Concern    None   Social History Narrative    None     Current Outpatient Medications   Medication Sig Dispense Refill    Arformoterol Tartrate (BROVANA) 15 MCG/2ML NEBU Take 2 mLs by nebulization 2 times daily use 1 vial in nebulizer twice a day DX COPD J44.9 360 mL 3    albuterol (PROVENTIL) (2.5 MG/3ML) 0.083% nebulizer solution USE 1 UNIT DOSE IN NEBULIZER EVERY 4 HOURS AS NEEDED FOR WHEEZING 300 mL 0    ipratropium (ATROVENT) 0.02 % nebulizer solution INHALE 1 VIAL VIA NEBUILZER 4 TIMES A  mL 0    B Complex Vitamins (VITAMIN-B COMPLEX PO) Take 1 capsule by mouth daily      Handicap Placard [facility] by Does not apply route 5 yrs 1 each 0    Lifitegrast (XIIDRA) 5 % SOLN Apply to eye      albuterol sulfate HFA (PROVENTIL HFA) 108 (90 Base) MCG/ACT inhaler Inhale 2 puffs into the lungs every 6 hours as needed for Wheezing 1 Inhaler 3    Nebulizers (AIRIAL COMPACT MINI NEBULIZER) MISC PLEASE PROVIDE PATIENT WITH [employer]OSPIRE PORTABLE NEBULIZER UNIT. HEALTH CARE SOLUTIONS 1 each 0    Carboxymethylcell-Hypromellose (GENTEAL OP) Apply to eye      Respiratory Therapy Supplies (NEBULIZER AIR TUBE/PLUGS) MISC Nebulizer supply, cup and tubing 1 each 0    POTASSIUM PO Take by mouth      vitamin E 1000 units capsule Take 1,000 Units by mouth daily      magnesium 30 MG tablet Take 30 mg by mouth daily      vitamin B-12 (CYANOCOBALAMIN) 100 MCG tablet Take 50 mcg by mouth daily       No current facility-administered medications for this visit.       Family History   Problem Relation Age of Onset    Heart Disease Father     High Blood Pressure Father      Past Medical History:   Diagnosis Date    Acute respiratory failure ([address]     Arthritis     general joints    Cataracts, bilateral 05/03/2017    COPD (chronic obstructive pulmonary disease) ([address]     Fracture of coccyx ([address]     History of non-ST elevation myocardial infarction (NSTEMI) 3/17/2016    History of tobacco abuse     past smoker / quit > 2 yrs    HTN (hypertension)     past trx    Lung disease     Tendinitis of wrist     BIALTERAL     Objective:   /70   Pulse 73   Temp 97.9 °F (36.6 °C)   Resp 14   Ht 5' 2\" (1.575 m)   Wt 137 lb 9.6 oz (62.4 kg)   SpO2 96%   BMI 25.17 kg/m²     Physical Exam  Neck: carotid bruit on right, neg on left   No masses. No adenopathy. No thyroid asymmetry. Lungs:clear and equal breath sounds. No wheezes or rales. Heart:rate reg. 1/6 syst murmur across precordium . No gallops   Pulses:Radials 2+ equal               D.P  1+ equal  Extremities:no edema in either leg  Gen: In no acute distress  Abdomen; B.S present. Soft  Non tender. No hepatosplenomegaly. No masses   Assessment:       Diagnosis Orders   1. Bilateral carotid artery stenosis  US CAROTID ARTERY BILATERAL    Lipid Panel    Comprehensive Metabolic Panel    CBC Auto Differential   2. Leukopenia, unspecified type  CBC Auto Differential   3. Chronic obstructive pulmonary disease, unspecified COPD type ([address]     4. History of smoking  US ABDOMEN LIMITED   5.  Screening for abdominal aortic aneurysm  US ABDOMEN LIMITED         Plan:    hep a vaccine this month  Can start hep b series in spring as recent testing shows no immunity   Orders Placed This Encounter   Procedures    US ABDOMEN LIMITED     Standing Status:   Future     Standing Expiration Date:   2/17/2022    US CAROTID ARTERY BILATERAL     Standing Status:   Future     Standing Expiration Date:   2/17/2022    Lipid Panel     Standing Status:   Future     Standing Expiration Date:   2/17/2022     Order Specific Question:   Is Patient Fasting?/# of Hours     Answer:   9    Comprehensive Metabolic Panel     Standing Status:   Future     Standing Expiration Date:   2/17/2022    CBC Auto Differential     Standing Status:   Future     Standing Expiration Date:   2/17/2022   fasting blood work in may and f/u after done

## 2021-02-17 NOTE — TELEPHONE ENCOUNTER
Contacted patient and informed her that Dr. Marlen Rapp had us order it so she will have to contact her insurance and have them call her DME.

## 2021-02-17 NOTE — TELEPHONE ENCOUNTER
Pt needs 2nd dose of hep A (havrix ) vaccine it does not contain Preservatives. Pt needs this brand. We do not have this vaccine at this time will speak to Leeanne about ordering it. Please call patient when vaccine is available.

## 2021-02-17 NOTE — TELEPHONE ENCOUNTER
Tried to call patient to inform her that we are unable to get the Havrix Hep A vaccine at our office. She will have to go to the health dept to receive her 2nd dose. The phone kept ringing with no answer and no voicemail. Will send letter to pt today of the information.

## 2021-03-03 NOTE — TELEPHONE ENCOUNTER
Called pharmacist Fabiana Ruiz to clarify hep a immunization. Pt needed 2nd dose and preservative free.  Pharmacist also needed date of first dose of Hep A which was 08/2020

## 2021-03-11 ENCOUNTER — HOSPITAL ENCOUNTER (OUTPATIENT)
Dept: LAB | Age: 61
Discharge: HOME | End: 2021-03-11
Payer: MEDICARE

## 2021-03-11 LAB
ALBUMIN SERPL-MCNC: 4.1 G/DL (ref 3.5–4.6)
ALP BLD-CCNC: 74 U/L (ref 40–130)
ALT SERPL-CCNC: 11 U/L (ref 0–33)
ANION GAP SERPL CALCULATED.3IONS-SCNC: 10 MEQ/L (ref 9–15)
AST SERPL-CCNC: 21 U/L (ref 0–35)
BASOPHILS ABSOLUTE: 0 K/UL (ref 0–0.2)
BASOPHILS RELATIVE PERCENT: 0.6 %
BILIRUB SERPL-MCNC: 0.5 MG/DL (ref 0.2–0.7)
BUN BLDV-MCNC: 7 MG/DL (ref 8–23)
CALCIUM SERPL-MCNC: 9.8 MG/DL (ref 8.5–9.9)
CHLORIDE BLD-SCNC: 102 MEQ/L (ref 95–107)
CHOLESTEROL, TOTAL: 150 MG/DL (ref 0–199)
CO2: 30 MEQ/L (ref 20–31)
CREAT SERPL-MCNC: 0.73 MG/DL (ref 0.5–0.9)
EOSINOPHILS ABSOLUTE: 0.3 K/UL (ref 0–0.7)
EOSINOPHILS RELATIVE PERCENT: 4.5 %
GFR AFRICAN AMERICAN: >60
GFR NON-AFRICAN AMERICAN: >60
GLOBULIN: 3.2 G/DL (ref 2.3–3.5)
GLUCOSE BLD-MCNC: 69 MG/DL (ref 70–99)
HCT VFR BLD CALC: 39.9 % (ref 37–47)
HDLC SERPL-MCNC: 61 MG/DL (ref 40–59)
HEMOGLOBIN: 13.2 G/DL (ref 12–16)
LDL CHOLESTEROL CALCULATED: 64 MG/DL (ref 0–129)
LYMPHOCYTES ABSOLUTE: 2.2 K/UL (ref 1–4.8)
LYMPHOCYTES RELATIVE PERCENT: 33.8 %
MCH RBC QN AUTO: 31.6 PG (ref 27–31.3)
MCHC RBC AUTO-ENTMCNC: 33 % (ref 33–37)
MCV RBC AUTO: 95.8 FL (ref 82–100)
MONOCYTES ABSOLUTE: 0.4 K/UL (ref 0.2–0.8)
MONOCYTES RELATIVE PERCENT: 5.5 %
NEUTROPHILS ABSOLUTE: 3.6 K/UL (ref 1.4–6.5)
NEUTROPHILS RELATIVE PERCENT: 55.6 %
PDW BLD-RTO: 14.8 % (ref 11.5–14.5)
PLATELET # BLD: 169 K/UL (ref 130–400)
POTASSIUM SERPL-SCNC: 4.1 MEQ/L (ref 3.4–4.9)
RBC # BLD: 4.17 M/UL (ref 4.2–5.4)
SODIUM BLD-SCNC: 142 MEQ/L (ref 135–144)
TOTAL PROTEIN: 7.3 G/DL (ref 6.3–8)
TRIGL SERPL-MCNC: 123 MG/DL (ref 0–150)
WBC # BLD: 6.5 K/UL (ref 4.8–10.8)

## 2021-03-11 PROCEDURE — 80053 COMPREHEN METABOLIC PANEL: CPT

## 2021-03-11 PROCEDURE — 80061 LIPID PANEL: CPT

## 2021-03-11 PROCEDURE — 85025 COMPLETE CBC W/AUTO DIFF WBC: CPT

## 2021-03-17 ENCOUNTER — NURSE ONLY (OUTPATIENT)
Dept: FAMILY MEDICINE CLINIC | Age: 61
End: 2021-03-17
Payer: MEDICARE

## 2021-03-17 DIAGNOSIS — Z23 NEED FOR HEPATITIS B VACCINATION: Primary | ICD-10-CM

## 2021-03-17 PROCEDURE — 90746 HEPB VACCINE 3 DOSE ADULT IM: CPT | Performed by: FAMILY MEDICINE

## 2021-03-17 PROCEDURE — G0010 ADMIN HEPATITIS B VACCINE: HCPCS | Performed by: FAMILY MEDICINE

## 2021-03-18 NOTE — TELEPHONE ENCOUNTER
Tyrese Tulio had her Hep B 3/17/21. How long should she wait before taking the COVID vaccine? Her next Hep B is in a month.

## 2021-03-29 DIAGNOSIS — J44.9 CHRONIC OBSTRUCTIVE PULMONARY DISEASE, UNSPECIFIED COPD TYPE (HCC): Primary | ICD-10-CM

## 2021-03-29 RX ORDER — ARFORMOTEROL TARTRATE 15 UG/2ML
15 SOLUTION RESPIRATORY (INHALATION)
Qty: 360 ML | Refills: 3 | Status: SHIPPED | OUTPATIENT
Start: 2021-03-29 | End: 2021-10-28 | Stop reason: SDUPTHER

## 2021-03-31 ENCOUNTER — TELEPHONE (OUTPATIENT)
Dept: FAMILY MEDICINE CLINIC | Age: 61
End: 2021-03-31

## 2021-03-31 NOTE — TELEPHONE ENCOUNTER
Patient called because she thinks she may have been bitten by a tick. She has a raspberry area on her skin the size of her palm. It is above the ankle on the inside of her leg. She wants to know if there is a shot that can be given early on to prevent the bite from doing any damage. She said she read that in a book. If not, than it will run its course. I advised that she should make an appt to have her leg looked at. She just wanted me to ask you if there is a shot available. Please advise.

## 2021-04-16 ENCOUNTER — NURSE ONLY (OUTPATIENT)
Dept: FAMILY MEDICINE CLINIC | Age: 61
End: 2021-04-16
Payer: MEDICARE

## 2021-04-16 DIAGNOSIS — Z23 NEED FOR HEPATITIS B VACCINATION: Primary | ICD-10-CM

## 2021-04-16 PROCEDURE — 90746 HEPB VACCINE 3 DOSE ADULT IM: CPT | Performed by: FAMILY MEDICINE

## 2021-04-16 PROCEDURE — G0010 ADMIN HEPATITIS B VACCINE: HCPCS | Performed by: FAMILY MEDICINE

## 2021-04-27 ENCOUNTER — OFFICE VISIT (OUTPATIENT)
Dept: PULMONOLOGY | Age: 61
End: 2021-04-27
Payer: MEDICARE

## 2021-04-27 VITALS
OXYGEN SATURATION: 98 % | HEIGHT: 62 IN | SYSTOLIC BLOOD PRESSURE: 118 MMHG | DIASTOLIC BLOOD PRESSURE: 68 MMHG | HEART RATE: 74 BPM | BODY MASS INDEX: 25.58 KG/M2 | WEIGHT: 139 LBS | TEMPERATURE: 97.5 F

## 2021-04-27 DIAGNOSIS — R91.1 LUNG NODULE: ICD-10-CM

## 2021-04-27 DIAGNOSIS — J44.9 CHRONIC OBSTRUCTIVE PULMONARY DISEASE, UNSPECIFIED COPD TYPE (HCC): Primary | ICD-10-CM

## 2021-04-27 PROCEDURE — 3017F COLORECTAL CA SCREEN DOC REV: CPT | Performed by: INTERNAL MEDICINE

## 2021-04-27 PROCEDURE — G8419 CALC BMI OUT NRM PARAM NOF/U: HCPCS | Performed by: INTERNAL MEDICINE

## 2021-04-27 PROCEDURE — 99214 OFFICE O/P EST MOD 30 MIN: CPT | Performed by: INTERNAL MEDICINE

## 2021-04-27 PROCEDURE — 1036F TOBACCO NON-USER: CPT | Performed by: INTERNAL MEDICINE

## 2021-04-27 PROCEDURE — 3023F SPIROM DOC REV: CPT | Performed by: INTERNAL MEDICINE

## 2021-04-27 PROCEDURE — G8427 DOCREV CUR MEDS BY ELIG CLIN: HCPCS | Performed by: INTERNAL MEDICINE

## 2021-04-27 PROCEDURE — G8926 SPIRO NO PERF OR DOC: HCPCS | Performed by: INTERNAL MEDICINE

## 2021-04-27 RX ORDER — ALBUTEROL SULFATE 90 UG/1
2 AEROSOL, METERED RESPIRATORY (INHALATION) EVERY 6 HOURS PRN
Qty: 1 INHALER | Refills: 1 | Status: SHIPPED | OUTPATIENT
Start: 2021-04-27 | End: 2021-10-28 | Stop reason: SDUPTHER

## 2021-04-27 ASSESSMENT — ENCOUNTER SYMPTOMS
VOICE CHANGE: 0
SHORTNESS OF BREATH: 1
DIARRHEA: 0
EYE ITCHING: 0
COUGH: 1
VOMITING: 0
TROUBLE SWALLOWING: 0
NAUSEA: 0
SORE THROAT: 0
SINUS PRESSURE: 0
ABDOMINAL PAIN: 0
CHEST TIGHTNESS: 0
WHEEZING: 0
EYE DISCHARGE: 0
RHINORRHEA: 0

## 2021-04-27 NOTE — PROGRESS NOTES
Subjective:     Trever Gill is a 56 y.o. female who complains today of:     Chief Complaint   Patient presents with    COPD     6 month f/u  would like an order for a portable nebulizer       HPI  She is using Brovana nebulizer twice a day and nebulizer with albuterol and ipratropium 4 times a day when necessary and proair HFA 2 puffs when necessary, she need refill on proair HFA refill  C/o shortness of breath with exertion. No  Wheezing   Occasional dry Cough. No Hemoptysis  No Chest tightness   No Chest pain with radiation  or pleuritic pain  No  leg edema   No orthopnea  No Fever or chills. No Rhinorrhea and postnasal drip. She said she is going to have CT chest in oct 2021          Allergies:  Pcn [penicillins], Corticosteroids, Lidocaine, Contrast [iodides], Glycerin, Naprosyn [naproxen], and Valium [diazepam]  Past Medical History:   Diagnosis Date    Acute respiratory failure ([address]     Arthritis     general joints    Cataracts, bilateral 05/03/2017    [facility]     Fracture of coccyx ([facility]     History of non-ST elevation myocardial infarction (NSTEMI) 3/17/2016    History of tobacco abuse     past smoker / quit > 2 yrs    HTN (hypertension)     past trx    Lung disease     Tendinitis of wrist     BIALTERAL     Past Surgical History:   Procedure Laterality Date    BREAST SURGERY      bilateral implants    CARDIAC CATHETERIZATION  3/2016    CATARACT REMOVAL Left 10/12/15     DR. SANTIAGO    CATARACT REMOVAL WITH IMPLANT Right 11/2/15       [address] [place] [address]    CHOLECYSTECTOMY, LAPAROSCOPIC N/A 7/14/2017    CHOLECYSTECTOMY LAPAROSCOPIC - CHOLANGIOGRAM POSS OPEN performed by Vanna Bedoya MD at [address] [place] [address]  2005    EYE SURGERY      [place] with IOL OU    TUBAL LIGATION       Family History   Problem Relation Age of Onset    Heart Disease Father     High Blood Pressure Father      Social History     Socioeconomic History    Marital status: Single     Spouse name: Not on file    Number of children: Not on file    Years of education: Not on file    Highest education level: Not on file   Occupational History    Not on file   Social Needs    Financial resource strain: Not hard at all    Food insecurity     Worry: Never true     Inability: Never true   [employer] needs     Medical: No     Non-medical: No   Tobacco Use    Smoking status: Former Smoker     Packs/day: 0.50     Years: 30.00     Pack years: 15.00     Quit date: 2015     Years since quittin.4    Smokeless tobacco: Never Used   Substance and Sexual Activity    Alcohol use: No    Drug use: No    Sexual activity: Not on file   Lifestyle    Physical activity     Days per week: Not on file     Minutes per session: Not on file    Stress: Not on file   Relationships    Social connections     Talks on phone: Not on file     Gets together: Not on file     Attends Restoration service: Not on file     Active member of club or organization: Not on file     Attends meetings of clubs or organizations: Not on file     Relationship status: Not on file    Intimate partner violence     Fear of current or ex partner: Not on file     Emotionally abused: Not on file     Physically abused: Not on file     Forced sexual activity: Not on file   Other Topics Concern    Not on file   Social History Narrative    Not on file         Review of Systems   Constitutional: Negative for chills, diaphoresis, fatigue and fever. HENT: Negative for congestion, mouth sores, nosebleeds, postnasal drip, rhinorrhea, sinus pressure, sneezing, sore throat, trouble swallowing and voice change. Eyes: Negative for discharge, itching and visual disturbance. Respiratory: Positive for cough and shortness of breath. Negative for chest tightness and wheezing. Cardiovascular: Negative for chest pain, palpitations and leg swelling.    Gastrointestinal: Negative for abdominal pain, diarrhea, nausea and vomiting. Genitourinary: Negative for difficulty urinating and hematuria. Musculoskeletal: Negative for arthralgias, joint swelling and myalgias. Skin: Negative for rash. Allergic/Immunologic: Negative for environmental allergies and food allergies. Neurological: Negative for dizziness, tremors, weakness and headaches. Psychiatric/Behavioral: Negative for behavioral problems and sleep disturbance.         :     Vitals:    04/27/21 0824   BP: 118/68   Pulse: 74   Temp: 97.5 °F (36.4 °C)   SpO2: 98%   Weight: 139 lb (63 kg)   Height: 5' 2\" (1.575 m)     Wt Readings from Last 3 Encounters:   04/27/21 139 lb (63 kg)   02/17/21 137 lb 9.6 oz (62.4 kg)   10/20/20 133 lb (60.3 kg)         Physical Exam  Constitutional:       General: She is not in acute distress. Appearance: She is well-developed. She is not diaphoretic. HENT:      Head: Normocephalic and atraumatic. Nose: Nose normal.   Eyes:      Pupils: Pupils are equal, round, and reactive to light. Neck:      Thyroid: No thyromegaly. Vascular: No JVD. Trachea: No tracheal deviation. Cardiovascular:      Rate and Rhythm: Normal rate and regular rhythm. Heart sounds: No murmur. No friction rub. No gallop. Pulmonary:      Effort: No respiratory distress. Breath sounds: No wheezing or rales. Comments: diminished Breath sound bilaterally. Chest:      Chest wall: No tenderness. Abdominal:      General: There is no distension. Tenderness: There is no abdominal tenderness. There is no rebound. Musculoskeletal: Normal range of motion. Lymphadenopathy:      Cervical: No cervical adenopathy. Skin:     General: Skin is warm and dry. Neurological:      Mental Status: She is alert and oriented to person, place, and time.       Coordination: Coordination normal.         Current Outpatient Medications   Medication Sig Dispense Refill    albuterol sulfate  (90 Base) MCG/ACT inhaler Inhale 2 puffs into the lungs every 6 hours as needed for Wheezing 1 Inhaler 1    Arformoterol Tartrate (BROVANA) 15 MCG/2ML NEBU Take 2 mLs by nebulization 2 times daily use 1 vial in nebulizer twice a day DX COPD J44.9 360 mL 3    albuterol (PROVENTIL) (2.5 MG/3ML) 0.083% nebulizer solution USE 1 UNIT DOSE IN NEBULIZER EVERY 4 HOURS AS NEEDED FOR WHEEZING 300 mL 0    ipratropium (ATROVENT) 0.02 % nebulizer solution INHALE 1 VIAL VIA NEBUILZER 4 TIMES A  mL 0    B Complex Vitamins (VITAMIN-B COMPLEX PO) Take 1 capsule by mouth daily      Handicap Placard MISC by Does not apply route 5 yrs 1 each 0    Lifitegrast (XIIDRA) 5 % SOLN Apply to eye      albuterol sulfate HFA (PROVENTIL HFA) 108 (90 Base) MCG/ACT inhaler Inhale 2 puffs into the lungs every 6 hours as needed for Wheezing 1 Inhaler 3    Nebulizers (AIRIAL COMPACT MINI NEBULIZER) MISC PLEASE PROVIDE PATIENT WITH INNOSPIRE PORTABLE NEBULIZER UNIT. HEALTH CARE SOLUTIONS 1 each 0    Carboxymethylcell-Hypromellose (GENTEAL OP) Apply to eye      Respiratory Therapy Supplies (NEBULIZER AIR TUBE/PLUGS) MISC Nebulizer supply, cup and tubing 1 each 0    POTASSIUM PO Take by mouth      vitamin E 1000 units capsule Take 1,000 Units by mouth daily      magnesium 30 MG tablet Take 30 mg by mouth daily      vitamin B-12 (CYANOCOBALAMIN) 100 MCG tablet Take 50 mcg by mouth daily       No current facility-administered medications for this visit. No results found for this or any previous visit.]  Results for orders placed during the hospital encounter of 05/03/17   XR Chest Portable    Narrative PORTABLE CHEST: 5/3/2017    CLINICAL HISTORY: Shortness of breath. COMPARISON: 12/3/2015, and chest CT 1/17/2017. A portable upright AP radiograph of the chest was obtained. FINDINGS:    A shallow inspiration is present without significant infiltrate identified.     The cardiac and mediastinal silhouettes appear within normal limits for the patient's age group and technique. There is no significant pleural effusion, vascular congestion, pneumothorax, or displaced fractures identified. Calcified prosthetic breast implants are again noted. Impression POOR INSPIRATION WITHOUT EVIDENCE OF ACTIVE CARDIOPULMONARY DISEASE.           ]  No results found for this or any previous visit.]  Results for orders placed during the hospital encounter of 10/15/20   CT CHEST WO CONTRAST    Narrative EXAMINATION: CT CHEST WITHOUT CONTRAST    CLINICAL HISTORY:R91.1 Lung nodule ICD10, follow-up groundglass opacity right apex. COMPARISONS: None available. TECHNIQUE: TECHNIQUE: Contiguous axial images were obtained through the chest without contrast. Coronal and sagittal reformations were made. FINDINGS:  Lungs: There is a stable groundglass opacity in the right apex measuring approximately 13 mm. No significant interval change since 8/18. Recommend continued surveillance with CT in one to 2 years. There is a 3.5 mm nodule in the lingula, adjacent to the major fissure, series 3, image 133. This also is unchanged. No other lung nodule or acute infiltrate. Ascending thoracic aorta is at the upper limits of normal in size measuring 3.9 cm in transverse diameter, unchanged. There are atherosclerotic calcifications in the aorta and coronary arteries. No significant mediastinal or hilar or axillary   lymphadenopathy. No pericardial effusion. No pleural effusion. No cardiac enlargement. No pneumothorax. 8 mm coarse calcification with peripheral calcification within the right lobe liver near the dome of the liver. This is unchanged dating back to 8/18. Bones are intact. Peripherally calcified bilateral breast implants. Impression  STABLE 1.3 CM GROUNDGLASS OPACITY RIGHT APEX. STABLE 3.5 MM NODULE LINGULA. NO ACUTE PROCESS. RECOMMEND FOLLOW UP CT SCAN IN 1 TO 2 YEARS.       All CT scans at this facility use dose modulation, iterative reconstruction, and/or weight based dosing when appropriate to reduce radiation dose to as low as reasonably achievable.       ]  No results found for this or any previous visit.]    Assessment/Plan:     1. Chronic obstructive pulmonary disease, unspecified COPD type ([facility]  She is using Brovana nebulizer twice a day and nebulizer with albuterol and ipratropium 4 times a day when necessary and proair HFA 2 puffs when necessary, she need refill on proair HFA refill  C/o shortness of breath with exertion. No  Wheezing . Occasional dry Cough. Continue bronchodilator  As before. - albuterol sulfate  (90 Base) MCG/ACT inhaler; Inhale 2 puffs into the lungs every 6 hours as needed for Wheezing  Dispense: 1 Inhaler; Refill: 1    2. Lung nodule    CT chest 10/15/20 reported stable 1.3 cm groundglass opacity in the right apex. Stable 3.5 mm nodule in lingula. No acute process recommend follow-up CT chest in 1 year and she will have CT chest in oct 2021    Return in about 6 months (around 10/27/2021) for Ct chest f/u, COPD.       Ian Najera MD

## 2021-05-17 ENCOUNTER — OFFICE VISIT (OUTPATIENT)
Dept: FAMILY MEDICINE CLINIC | Age: 61
End: 2021-05-17
Payer: MEDICARE

## 2021-05-17 VITALS
DIASTOLIC BLOOD PRESSURE: 82 MMHG | HEIGHT: 67 IN | WEIGHT: 137 LBS | OXYGEN SATURATION: 97 % | RESPIRATION RATE: 14 BRPM | TEMPERATURE: 98 F | BODY MASS INDEX: 21.5 KG/M2 | SYSTOLIC BLOOD PRESSURE: 130 MMHG | HEART RATE: 69 BPM

## 2021-05-17 DIAGNOSIS — R39.15 URGENCY OF URINATION: Primary | ICD-10-CM

## 2021-05-17 DIAGNOSIS — Z01.419 ENCOUNTER FOR GYNECOLOGICAL EXAMINATION WITHOUT ABNORMAL FINDING: ICD-10-CM

## 2021-05-17 LAB
BILIRUBIN, POC: NORMAL
BLOOD URINE, POC: NORMAL
CLARITY, POC: CLEAR
COLOR, POC: YELLOW
GLUCOSE URINE, POC: NORMAL
KETONES, POC: NORMAL
LEUKOCYTE EST, POC: NORMAL
NITRITE, POC: NORMAL
PH, POC: 5.5
PROTEIN, POC: NORMAL
SPECIFIC GRAVITY, POC: 1
UROBILINOGEN, POC: 0.2

## 2021-05-17 PROCEDURE — 1036F TOBACCO NON-USER: CPT | Performed by: FAMILY MEDICINE

## 2021-05-17 PROCEDURE — 81002 URINALYSIS NONAUTO W/O SCOPE: CPT | Performed by: FAMILY MEDICINE

## 2021-05-17 PROCEDURE — G8427 DOCREV CUR MEDS BY ELIG CLIN: HCPCS | Performed by: FAMILY MEDICINE

## 2021-05-17 PROCEDURE — 99213 OFFICE O/P EST LOW 20 MIN: CPT | Performed by: FAMILY MEDICINE

## 2021-05-17 PROCEDURE — G8420 CALC BMI NORM PARAMETERS: HCPCS | Performed by: FAMILY MEDICINE

## 2021-05-17 PROCEDURE — 3017F COLORECTAL CA SCREEN DOC REV: CPT | Performed by: FAMILY MEDICINE

## 2021-05-17 ASSESSMENT — ENCOUNTER SYMPTOMS
DIARRHEA: 0
VOMITING: 0

## 2021-05-17 NOTE — PROGRESS NOTES
Subjective:      Patient ID: Bryce Navarro is a 56 y.o. female. HPI    Review of Systems  Treatment Adherence:   Medication compliance:  {Desc; compliance:5303::\"compliant most of the time\"}  Diet compliance:  {Desc; compliance:5303::\"compliant most of the time\"}  Weight trend: {INCREASING/DECREASING/STABLE:66736}  Current exercise: {EXERCISE XDWV:365169574}  Barriers: {Barriers to success:13431}    Hypertension:  Home blood pressure monitoring: {NO/YES:4613168629::\"No\"}. She {is/is not:9024} adherent to a low sodium diet. Patient {denies/complains:30818} {Symptoms of Hypertension, Denies:71203}. Antihypertensive medication side effects: {Hypertension med side effects:5728::\"no medication side effects noted\"}. Use of agents associated with hypertension: {bp agents assoc with hypertension:511::\"none\"}. Sodium (mEq/L)   Date Value   03/11/2021 142    BUN (mg/dL)   Date Value   03/11/2021 7 (L)    Glucose (mg/dL)   Date Value   03/11/2021 69 (L)      Potassium (mEq/L)   Date Value   03/11/2021 4.1    CREATININE (mg/dL)   Date Value   03/11/2021 0.73         Hyperlipidemia:  No new myalgias or GI upset on {RP HYPERLIPIDEMIA MEDS:27912}.      Lab Results   Component Value Date    CHOL 150 03/11/2021    TRIG 123 03/11/2021    HDL 61 (H) 03/11/2021    LDLCALC 64 03/11/2021     Lab Results   Component Value Date    ALT 11 03/11/2021    AST 21 03/11/2021          Objective:   Physical Exam    Assessment / Plan:

## 2021-05-17 NOTE — PROGRESS NOTES
Subjective:      Patient ID: Lunette Litten is a 56 y.o. female    HPIhere with about 10 days of intermittent urinary urgency. No hematuria. No dysuria. No fever. Some pelvic pressure. Would like gyn referral.    Also here in follow up for recent blood work    Review of Systems   Constitutional: Negative for chills. Gastrointestinal: Negative for diarrhea and vomiting. Skin: Negative for rash. Neurological: Negative for weakness. Reviewed allergy, medical, social, surgical, family and med list changes and updated   Files-reviewed blood work which is acceptable      Social History     Socioeconomic History    Marital status: Single     Spouse name: None    Number of children: None    Years of education: None    Highest education level: None   Occupational History    None   Tobacco Use    Smoking status: Former Smoker     Packs/day: 0.50     Years: 30.00     Pack years: 15.00     Quit date: 2015     Years since quittin.4    Smokeless tobacco: Never Used   Substance and Sexual Activity    Alcohol use: No    Drug use: No    Sexual activity: None   Other Topics Concern    None   Social History Narrative    None     Social Determinants of Health     Financial Resource Strain: Low Risk     Difficulty of Paying Living Expenses: Not hard at all   Food Insecurity: No Food Insecurity    Worried About Running Out of Food in the Last Year: Never true    Shane of Food in the Last Year: Never true   Transportation Needs: No Transportation Needs    Lack of Transportation (Medical): No    Lack of Transportation (Non-Medical):  No   Physical Activity:     Days of Exercise per Week:     Minutes of Exercise per Session:    Stress:     Feeling of Stress :    Social Connections:     Frequency of Communication with Friends and Family:     Frequency of Social Gatherings with Friends and Family:     Attends Mandaen Services:     Active Member of Clubs or Organizations:     Attends Club or Organization Meetings:     Marital Status:    Intimate Partner Violence:     Fear of Current or Ex-Partner:     Emotionally Abused:     Physically Abused:     Sexually Abused:      Current Outpatient Medications   Medication Sig Dispense Refill    albuterol sulfate  (90 Base) MCG/ACT inhaler Inhale 2 puffs into the lungs every 6 hours as needed for Wheezing 1 Inhaler 1    Arformoterol Tartrate (BROVANA) 15 MCG/2ML NEBU Take 2 mLs by nebulization 2 times daily use 1 vial in nebulizer twice a day DX COPD J44.9 360 mL 3    albuterol (PROVENTIL) (2.5 MG/3ML) 0.083% nebulizer solution USE 1 UNIT DOSE IN NEBULIZER EVERY 4 HOURS AS NEEDED FOR WHEEZING 300 mL 0    ipratropium (ATROVENT) 0.02 % nebulizer solution INHALE 1 VIAL VIA NEBUILZER 4 TIMES A  mL 0    B Complex Vitamins (VITAMIN-B COMPLEX PO) Take 1 capsule by mouth daily      Handicap Placard [facility] by Does not apply route 5 yrs 1 each 0    Lifitegrast (XIIDRA) 5 % SOLN Apply to eye      albuterol sulfate HFA (PROVENTIL HFA) 108 (90 Base) MCG/ACT inhaler Inhale 2 puffs into the lungs every 6 hours as needed for Wheezing 1 Inhaler 3    Nebulizers (AIRIAL COMPACT MINI NEBULIZER) MISC PLEASE PROVIDE PATIENT WITH INNOSPIRE PORTABLE NEBULIZER UNIT. HEALTH CARE SOLUTIONS 1 each 0    Carboxymethylcell-Hypromellose (GENTEAL OP) Apply to eye      Respiratory Therapy Supplies (NEBULIZER AIR TUBE/PLUGS) MISC Nebulizer supply, cup and tubing 1 each 0    POTASSIUM PO Take by mouth      vitamin E 1000 units capsule Take 1,000 Units by mouth daily      magnesium 30 MG tablet Take 30 mg by mouth daily      vitamin B-12 (CYANOCOBALAMIN) 100 MCG tablet Take 50 mcg by mouth daily       No current facility-administered medications for this visit.      Family History   Problem Relation Age of Onset    Heart Disease Father     High Blood Pressure Father      Past Medical History:   Diagnosis Date    Acute [facility] failure ([facility]     Arthritis general joints    Cataracts, bilateral 05/03/2017    COPD (chronic obstructive pulmonary disease) (HCC)     Fracture of coccyx (HCC)     History of non-ST elevation myocardial infarction (NSTEMI) 3/17/2016    History of tobacco abuse     past smoker / quit > 2 yrs    HTN (hypertension)     past trx    Lung disease     Tendinitis of wrist     BIALTERAL     Objective:   /82   Pulse 69   Temp 98 °F (36.7 °C)   Resp 14   Ht 5' 7\" (1.702 m)   Wt 137 lb (62.1 kg)   SpO2 97%   BMI 21.46 kg/m²     Physical Exam  Lungs:Clear  Equal b.s bilaterally  Heart:  Rate reg     No murmur  Back:       No  CVA tenderness  Abdomen: B.S present   Soft          No  Tenderness         No  Rebound                    No hepatosplenomegaly. No masses. Gen:      No acute distress  Skin:      No rashes   Assessment:       Diagnosis Orders   1. Urgency of urination  POCT Urinalysis no Micro    Culture, Urine   2. Encounter for gynecological examination without abnormal finding           Plan:      Orders Placed This Encounter   Procedures    Culture, Urine     Standing Status:   Future     Standing Expiration Date:   5/17/2022     Order Specific Question:   Specify (ex-cath, midstream, cysto, etc)?      Answer:   troy Cox MD, OB-GYN, [place]     Referral Priority:   Routine     Referral Type:   Eval and Treat     Referral Reason:   Specialty Services Required     Referred to Provider:   Shelly Moore MD     Requested Specialty:   Obstetrics & Gynecology     Number of Visits Requested:   1    POCT Urinalysis no Micro   f/u in sept after all testing done

## 2021-05-18 ENCOUNTER — TELEPHONE (OUTPATIENT)
Dept: FAMILY MEDICINE CLINIC | Age: 61
End: 2021-05-18

## 2021-05-21 ENCOUNTER — TELEPHONE (OUTPATIENT)
Dept: FAMILY MEDICINE CLINIC | Age: 61
End: 2021-05-21

## 2021-05-21 NOTE — TELEPHONE ENCOUNTER
Rg Verdugo thought that the Cologuard was going to be ordered. [employer] [facility] has not received the order. I don't see an order in her chart for that. Please advise.

## 2021-05-22 ENCOUNTER — TELEPHONE (OUTPATIENT)
Dept: PRIMARY CARE CLINIC | Age: 61
End: 2021-05-22

## 2021-05-25 ENCOUNTER — TELEPHONE (OUTPATIENT)
Dept: FAMILY MEDICINE CLINIC | Age: 61
End: 2021-05-25

## 2021-05-25 ENCOUNTER — TELEPHONE (OUTPATIENT)
Dept: PULMONOLOGY | Age: 61
End: 2021-05-25

## 2021-05-25 DIAGNOSIS — R91.1 LUNG NODULE: Primary | ICD-10-CM

## 2021-05-25 DIAGNOSIS — J44.9 CHRONIC OBSTRUCTIVE PULMONARY DISEASE, UNSPECIFIED COPD TYPE (HCC): ICD-10-CM

## 2021-05-25 RX ORDER — ALBUTEROL SULFATE 2.5 MG/3ML
SOLUTION RESPIRATORY (INHALATION)
Qty: 300 ML | Refills: 0 | Status: SHIPPED | OUTPATIENT
Start: 2021-05-25 | End: 2021-12-22

## 2021-05-25 NOTE — TELEPHONE ENCOUNTER
Pt called asking if a cologuard order was put in for her. Pt states she had put a request in a few days ago for a test to be ordered and hasn't received a kit.  Please advise

## 2021-05-26 DIAGNOSIS — Z12.11 ENCOUNTER FOR SCREENING COLONOSCOPY: Primary | ICD-10-CM

## 2021-05-26 NOTE — TELEPHONE ENCOUNTER
Tried to call the patient to explain the requested RX's were already sent in without refills.   Next fill she can ask for refills

## 2021-05-28 DIAGNOSIS — Z13.6 SCREENING FOR ABDOMINAL AORTIC ANEURYSM: Primary | ICD-10-CM

## 2021-06-17 ENCOUNTER — HOSPITAL ENCOUNTER (OUTPATIENT)
Dept: ULTRASOUND IMAGING | Age: 61
Discharge: HOME | End: 2021-06-19
Payer: MEDICARE

## 2021-06-17 DIAGNOSIS — I65.23 BILATERAL CAROTID ARTERY STENOSIS: ICD-10-CM

## 2021-06-17 DIAGNOSIS — Z13.6 SCREENING FOR ABDOMINAL AORTIC ANEURYSM: ICD-10-CM

## 2021-06-17 PROCEDURE — 93880 EXTRACRANIAL BILAT STUDY: CPT

## 2021-06-17 PROCEDURE — 76706 US ABDL AORTA SCREEN AAA: CPT

## 2021-07-16 ENCOUNTER — TELEPHONE (OUTPATIENT)
Dept: FAMILY MEDICINE CLINIC | Age: 61
End: 2021-07-16

## 2021-07-16 NOTE — TELEPHONE ENCOUNTER
Her tetanus vaccine is due. Will you order? It has been a year since she had her US of the breasts. She has done self exams and does not feel a need to have it done at this time, however does she wants you professional opionion on that. She will be having a lung CT scan in October. Please advise. Her phone is not working well and she will call us back on Monday.

## 2021-07-16 NOTE — TELEPHONE ENCOUNTER
Can order tetanus on next visit. Can get u/s done of breast.  Should get ct done when appropriate.   F/u later in year as discussed

## 2021-07-21 NOTE — TELEPHONE ENCOUNTER
Patient called again. She is going to pass on the us of breast.  As for the tetanus shot, at her next appt she is getting #3 Hep B so she said she can't get both. When should she get the tetanus? She also wants to know if you want her to come in for any blood work before her next appt. (she mentioned Vit.  D)  Please advise, thank you

## 2021-08-11 ENCOUNTER — TELEPHONE (OUTPATIENT)
Dept: FAMILY MEDICINE CLINIC | Age: 61
End: 2021-08-11

## 2021-08-11 ENCOUNTER — NURSE ONLY (OUTPATIENT)
Dept: FAMILY MEDICINE CLINIC | Age: 61
End: 2021-08-11
Payer: MEDICARE

## 2021-08-11 DIAGNOSIS — Z01.89 PATIENT REQUESTED DIAGNOSTIC TESTING: Primary | ICD-10-CM

## 2021-08-11 DIAGNOSIS — Z23 NEED FOR TDAP VACCINATION: Primary | ICD-10-CM

## 2021-08-11 PROCEDURE — 90715 TDAP VACCINE 7 YRS/> IM: CPT | Performed by: FAMILY MEDICINE

## 2021-08-11 PROCEDURE — 90471 IMMUNIZATION ADMIN: CPT | Performed by: FAMILY MEDICINE

## 2021-09-17 ENCOUNTER — OFFICE VISIT (OUTPATIENT)
Dept: FAMILY MEDICINE CLINIC | Age: 61
End: 2021-09-17
Payer: MEDICARE

## 2021-09-17 ENCOUNTER — NURSE ONLY (OUTPATIENT)
Dept: FAMILY MEDICINE CLINIC | Age: 61
End: 2021-09-17
Payer: MEDICARE

## 2021-09-17 VITALS
OXYGEN SATURATION: 96 % | BODY MASS INDEX: 23.08 KG/M2 | HEIGHT: 62 IN | TEMPERATURE: 98.2 F | DIASTOLIC BLOOD PRESSURE: 70 MMHG | WEIGHT: 125.4 LBS | SYSTOLIC BLOOD PRESSURE: 120 MMHG | RESPIRATION RATE: 14 BRPM | HEART RATE: 65 BPM

## 2021-09-17 DIAGNOSIS — I65.23 BILATERAL CAROTID ARTERY STENOSIS: ICD-10-CM

## 2021-09-17 DIAGNOSIS — Z23 NEED FOR HEPATITIS B VACCINATION: Primary | ICD-10-CM

## 2021-09-17 DIAGNOSIS — M54.42 CHRONIC LEFT-SIDED LOW BACK PAIN WITH LEFT-SIDED SCIATICA: ICD-10-CM

## 2021-09-17 DIAGNOSIS — G89.29 CHRONIC LEFT-SIDED LOW BACK PAIN WITH LEFT-SIDED SCIATICA: ICD-10-CM

## 2021-09-17 DIAGNOSIS — E55.9 VITAMIN D DEFICIENCY: ICD-10-CM

## 2021-09-17 DIAGNOSIS — L30.4 INTERTRIGO: ICD-10-CM

## 2021-09-17 DIAGNOSIS — M79.642 PAIN OF LEFT HAND: Primary | ICD-10-CM

## 2021-09-17 DIAGNOSIS — D72.819 LEUKOPENIA, UNSPECIFIED TYPE: ICD-10-CM

## 2021-09-17 PROCEDURE — 90746 HEPB VACCINE 3 DOSE ADULT IM: CPT | Performed by: FAMILY MEDICINE

## 2021-09-17 PROCEDURE — 3017F COLORECTAL CA SCREEN DOC REV: CPT | Performed by: FAMILY MEDICINE

## 2021-09-17 PROCEDURE — 99214 OFFICE O/P EST MOD 30 MIN: CPT | Performed by: FAMILY MEDICINE

## 2021-09-17 PROCEDURE — G0010 ADMIN HEPATITIS B VACCINE: HCPCS | Performed by: FAMILY MEDICINE

## 2021-09-17 PROCEDURE — 1036F TOBACCO NON-USER: CPT | Performed by: FAMILY MEDICINE

## 2021-09-17 PROCEDURE — G8427 DOCREV CUR MEDS BY ELIG CLIN: HCPCS | Performed by: FAMILY MEDICINE

## 2021-09-17 PROCEDURE — G8420 CALC BMI NORM PARAMETERS: HCPCS | Performed by: FAMILY MEDICINE

## 2021-09-17 RX ORDER — KETOCONAZOLE 20 MG/G
CREAM TOPICAL
Qty: 30 G | Refills: 1 | Status: SHIPPED | OUTPATIENT
Start: 2021-09-17 | End: 2021-09-17 | Stop reason: SDUPTHER

## 2021-09-17 RX ORDER — KETOCONAZOLE 20 MG/G
CREAM TOPICAL
Qty: 30 G | Refills: 1 | Status: SHIPPED | OUTPATIENT
Start: 2021-09-17

## 2021-09-17 ASSESSMENT — ENCOUNTER SYMPTOMS
BACK PAIN: 1
VOMITING: 0
COUGH: 0

## 2021-09-17 NOTE — PROGRESS NOTES
Subjective:      Patient ID: Yesenia Newton is a 56 y.o. female    Other  This is a chronic problem. The current episode started more than 1 year ago. The problem has been unchanged. Associated symptoms include a rash. Pertinent negatives include no chest pain, chills, coughing, fever, vomiting or weakness. Back Pain  Pertinent negatives include no chest pain, fever or weakness. Here in follow up for carotid disease and recent blood work. Admits has been doing much less activity as of recently Has had recent rash under right breast since earlier this week. Also has had some left hip pain intermittently  And left leg pain over the last 3 months. No injury. Would like referral for another opinion about chronic left hand pain    Review of Systems   Constitutional: Negative for chills and fever. Respiratory: Negative for cough. Cardiovascular: Negative for chest pain. Gastrointestinal: Negative for vomiting. Musculoskeletal: Positive for back pain. Skin: Positive for rash. Neurological: Negative for weakness.      Reviewed allergy, medical, social, surgical, family and med list changes and updated   Files-reviewed blood work with suboptimal lipids and carotid u/s showing mild narrowing of both carotids      Social History     Socioeconomic History    Marital status: Single     Spouse name: None    Number of children: None    Years of education: None    Highest education level: None   Occupational History    None   Tobacco Use    Smoking status: Former Smoker     Packs/day: 0.50     Years: 30.00     Pack years: 15.00     Quit date: 2015     Years since quittin.7    Smokeless tobacco: Never Used   Substance and Sexual Activity    Alcohol use: No    Drug use: No    Sexual activity: None   Other Topics Concern    None   Social History Narrative    None     Social Determinants of Health     Financial Resource Strain: Low Risk     Difficulty of Paying Living Expenses: Not hard at all Food Insecurity: No Food Insecurity    Worried About Running Out of Food in the Last Year: Never true    Shane of Food in the Last Year: Never true   Transportation Needs: No Transportation Needs    Lack of Transportation (Medical): No    Lack of Transportation (Non-Medical): No   Physical Activity:     Days of Exercise per Week:     Minutes of Exercise per Session:    Stress:     Feeling of Stress :    Social Connections:     Frequency of Communication with Friends and Family:     Frequency of Social Gatherings with Friends and Family:     Attends Evangelical Services:     Active Member of Clubs or Organizations:     Attends Club or Organization Meetings:     Marital Status:    Intimate Partner Violence:     Fear of Current or Ex-Partner:     Emotionally Abused:     Physically Abused:     Sexually Abused:      Current Outpatient Medications   Medication Sig Dispense Refill    ipratropium (ATROVENT) 0.02 % nebulizer solution INHALE 1 VIAL VIA NEBUILZER 4 TIMES A  mL 0    albuterol (PROVENTIL) (2.5 MG/3ML) 0.083% nebulizer solution USE 1 VIAL IN NEBULIZER EVERY 4 HOURS AS NEEDED FOR WHEEZING 300 mL 0    albuterol sulfate  (90 Base) MCG/ACT inhaler Inhale 2 puffs into the lungs every 6 hours as needed for Wheezing 1 Inhaler 1    Arformoterol Tartrate (BROVANA) 15 MCG/2ML NEBU Take 2 mLs by nebulization 2 times daily use 1 vial in nebulizer twice a day DX COPD J44.9 360 mL 3    B Complex Vitamins (VITAMIN-B COMPLEX PO) Take 1 capsule by mouth daily      Handicap Placard MISC by Does not apply route 5 yrs 1 each 0    Lifitegrast (XIIDRA) 5 % SOLN Apply to eye      albuterol sulfate HFA (PROVENTIL HFA) 108 (90 Base) MCG/ACT inhaler Inhale 2 puffs into the lungs every 6 hours as needed for Wheezing 1 Inhaler 3    Nebulizers (AIRIAL COMPACT MINI NEBULIZER) MISC PLEASE PROVIDE PATIENT WITH INNOSPIRE PORTABLE NEBULIZER UNIT.   HEALTH CARE SOLUTIONS 1 each 0    Carboxymethylcell-Hypromellose (GENTEAL OP) Apply to eye      Respiratory Therapy Supplies (NEBULIZER AIR TUBE/PLUGS) MISC Nebulizer supply, cup and tubing 1 each 0    POTASSIUM PO Take by mouth      vitamin E 1000 units capsule Take 1,000 Units by mouth daily      magnesium 30 MG tablet Take 30 mg by mouth daily      vitamin B-12 (CYANOCOBALAMIN) 100 MCG tablet Take 50 mcg by mouth daily       No current facility-administered medications for this visit. Family History   Problem Relation Age of Onset    Heart Disease Father     High Blood Pressure Father      Past Medical History:   Diagnosis Date    Acute respiratory failure ([address]     Arthritis     general joints    Cataracts, bilateral 05/03/2017    COPD (chronic obstructive pulmonary disease) (HCC)     Fracture of coccyx (HCC)     History of non-ST elevation myocardial infarction (NSTEMI) 3/17/2016    History of tobacco abuse     past smoker / quit > 2 yrs    HTN (hypertension)     past trx    Lung disease     Tendinitis of wrist     BIALTERAL     Objective:   /70   Pulse 65   Temp 98.2 °F (36.8 °C)   Resp 14   Ht 5' 2\" (1.575 m)   Wt 125 lb 6.4 oz (56.9 kg)   SpO2 96%   BMI 22.94 kg/m²     Physical Exam   Skin;  Small area of flat to raised erythema just inferior to right breast on chest wall. Has scaly component   Neck: right  carotid bruit but none on left   No masses. No adenopathy. No thyroid asymmetry. Lungs:clear and equal breath sounds. No wheezes or rales. Heart:rate reg. No murmur. No gallops   Pulses:Radials 2+ equal               D.P  1+ equal  Extremities:no edema in either leg. Good internal and external rotation of left hip   Gen: In no acute distress  Abdomen; B.S present. Soft  Non tender. No hepatosplenomegaly.  No masses      Mild  Tenderness at L-S junction on left side                                            No Paraspinal spasm                                  No  CVA tenderness   Neuro: Dorsi/Plantar flexion -extension   R  5/5                     Dorsi/Plantar flexion- extension   L  5/5                    Quad strength    R  5/5                     Quad strength    L  5/5  Pulses:    D.P.  Pulses   1+ equal  Extremities:  No edema of either leg  Lung:  Clear and equal breath sounds bilaterally. No wheezes or rales  Heart:   Rate reg. No murmur or gallop   Assessment:       Diagnosis Orders   1. Bilateral carotid artery stenosis     2. Leukopenia, unspecified type     3. Vitamin D deficiency     4. Intertrigo     5. Chronic left-sided low back pain with left-sided sciatica  JENNIFFER Mcmahon MD, Orthopaedic Surgery   6. Pain of left hand           Plan:      Orders Placed This Encounter   Medications    hydrocortisone 2.5 % cream     Sig: Apply topically 2 times daily x 2 weeks. Dispense:  3.5 g     Refill:  0    ketoconazole (NIZORAL) 2 % cream     Sig: Apply topically daily x 2 weeks.      Dispense:  30 g     Refill:  1     Orders Placed This Encounter   Procedures   Abbie Kelly MD, Orthopaedic Surgery     Referral Priority:   Routine     Referral Type:   Eval and Treat     Referral Reason:   Specialty Services Required     Referred to Provider:   Noah Olmstead MD     Requested Specialty:   Orthopedic Surgery     Number of Visits Requested:   [facility] Jeremie Dewey MD, Orthopaedic Surgery     Referral Priority:   Routine     Referral Type:   Eval and Treat     Referral Reason:   Specialty Services Required     Referred to Provider:   Errol Ruiz MD     Requested Specialty:   Orthopedic Surgery     Number of Visits Requested:   1     Continue current medications  Fasting blood work in 6 months and f/u after above -sooner if needed-patient did not wish to start lipid lowering agent at this time

## 2021-09-19 ENCOUNTER — TELEPHONE (OUTPATIENT)
Dept: FAMILY MEDICINE CLINIC | Age: 61
End: 2021-09-19

## 2021-09-19 NOTE — TELEPHONE ENCOUNTER
----- Message from Taylor Dixon sent at 9/17/2021 10:37 AM EDT -----  Subject: Message to Provider    QUESTIONS  Information for Provider? pt wants the scripts that were sent to [facility]   today to be canceled. she can't get them filled until those are canceled. it's showing at the pharmacy she's currently at that it's being filled at   another location. she said please cancel. no need to call her.   ---------------------------------------------------------------------------  --------------  CALL BACK INFO  What is the best way for the office to contact you? Do not leave any   message, patient will call back for answer  Preferred Call Back Phone Number? [phone]  ---------------------------------------------------------------------------  --------------  SCRIPT ANSWERS  Relationship to Patient?  Self

## 2021-10-07 ENCOUNTER — HOSPITAL ENCOUNTER (OUTPATIENT)
Dept: CT IMAGING | Age: 61
Discharge: HOME | End: 2021-10-09
Payer: MEDICARE

## 2021-10-07 DIAGNOSIS — R91.1 LUNG NODULE: ICD-10-CM

## 2021-10-07 PROCEDURE — 71250 CT THORAX DX C-: CPT

## 2021-10-28 ENCOUNTER — OFFICE VISIT (OUTPATIENT)
Dept: PULMONOLOGY | Age: 61
End: 2021-10-28
Payer: MEDICARE

## 2021-10-28 VITALS
SYSTOLIC BLOOD PRESSURE: 138 MMHG | HEIGHT: 62 IN | OXYGEN SATURATION: 99 % | TEMPERATURE: 98.2 F | HEART RATE: 63 BPM | BODY MASS INDEX: 23.59 KG/M2 | WEIGHT: 128.2 LBS | DIASTOLIC BLOOD PRESSURE: 82 MMHG

## 2021-10-28 DIAGNOSIS — J44.9 CHRONIC OBSTRUCTIVE PULMONARY DISEASE, UNSPECIFIED COPD TYPE (HCC): ICD-10-CM

## 2021-10-28 DIAGNOSIS — R91.1 LUNG NODULE: Primary | ICD-10-CM

## 2021-10-28 PROCEDURE — G8482 FLU IMMUNIZE ORDER/ADMIN: HCPCS | Performed by: INTERNAL MEDICINE

## 2021-10-28 PROCEDURE — G8427 DOCREV CUR MEDS BY ELIG CLIN: HCPCS | Performed by: INTERNAL MEDICINE

## 2021-10-28 PROCEDURE — 1036F TOBACCO NON-USER: CPT | Performed by: INTERNAL MEDICINE

## 2021-10-28 PROCEDURE — 99214 OFFICE O/P EST MOD 30 MIN: CPT | Performed by: INTERNAL MEDICINE

## 2021-10-28 PROCEDURE — G8420 CALC BMI NORM PARAMETERS: HCPCS | Performed by: INTERNAL MEDICINE

## 2021-10-28 PROCEDURE — 3017F COLORECTAL CA SCREEN DOC REV: CPT | Performed by: INTERNAL MEDICINE

## 2021-10-28 PROCEDURE — 3023F SPIROM DOC REV: CPT | Performed by: INTERNAL MEDICINE

## 2021-10-28 PROCEDURE — G8926 SPIRO NO PERF OR DOC: HCPCS | Performed by: INTERNAL MEDICINE

## 2021-10-28 RX ORDER — ARFORMOTEROL TARTRATE 15 UG/2ML
15 SOLUTION RESPIRATORY (INHALATION) 2 TIMES DAILY
Qty: 360 ML | Refills: 3 | Status: SHIPPED | OUTPATIENT
Start: 2021-10-28 | End: 2022-10-18 | Stop reason: SDUPTHER

## 2021-10-28 RX ORDER — ALBUTEROL SULFATE 90 UG/1
2 AEROSOL, METERED RESPIRATORY (INHALATION) EVERY 6 HOURS PRN
Qty: 1 EACH | Refills: 5 | Status: SHIPPED | OUTPATIENT
Start: 2021-10-28 | End: 2022-04-28

## 2021-10-28 ASSESSMENT — ENCOUNTER SYMPTOMS
VOICE CHANGE: 0
WHEEZING: 1
COUGH: 1
SHORTNESS OF BREATH: 1
CHEST TIGHTNESS: 0
NAUSEA: 0
TROUBLE SWALLOWING: 0
SORE THROAT: 0
ABDOMINAL PAIN: 0
VOMITING: 0
SINUS PRESSURE: 0
DIARRHEA: 0
RHINORRHEA: 0
EYE DISCHARGE: 0
EYE ITCHING: 0

## 2021-10-28 NOTE — PROGRESS NOTES
Subjective:     Laurie Salazar is a 59 y.o. female who complains today of:     Chief Complaint   Patient presents with    COPD     6 month f/u  CT scan results       HPI  She had CT chest done  And came for f/u   There is a ground glass opacity in the right apex which measures approximately 16 x 19 x 21 mm mm in diameter and previously measured 13 x 17 x 18 mm. This is slightly increased in size but less than 30 mm in diameter. There is a small solid, component measuring 6 mm in diameter, unchanged. This is probably benign. Recommend follow up exam in 6 months. She is using Brovana nebulizer twice a day and nebulizer with albuterol and ipratropium 4 times a day when necessary and proair HFA 2 puffs when necessary. C/o shortness of breath with exertion. On and off Wheezing. No Cough  No Hemoptysis. No Chest tightness. No Chest pain with radiation or pleuritic pain. No  leg edema. No orthopnea. No Fever or chills. No Rhinorrhea and postnasal drip. Allergies:  Pcn [penicillins], Corticosteroids, Lidocaine, Contrast [iodides], Glycerin, Naprosyn [naproxen], and Valium [diazepam]  Past Medical History:   Diagnosis Date    Acute respiratory failure ([address]     Arthritis     general joints    Cataracts, bilateral 05/03/2017    COPD (chronic obstructive pulmonary disease) (HCC)     Fracture of coccyx (HCC)     History of non-ST elevation myocardial infarction (NSTEMI) 3/17/2016    History of tobacco abuse     past smoker / quit > 2 yrs    HTN (hypertension)     past trx    Lung disease     Tendinitis of wrist     BIALTERAL     Past Surgical History:   Procedure Laterality Date    BREAST SURGERY      bilateral implants    CARDIAC CATHETERIZATION  3/2016    CATARACT REMOVAL Left 10/12/15     DR. SANTIAGO    CATARACT REMOVAL WITH IMPLANT Right 11/2/15       [address] [place] [address]    CHOLECYSTECTOMY, LAPAROSCOPIC N/A 7/14/2017    CHOLECYSTECTOMY LAPAROSCOPIC - CHOLANGIOGRAM POSS OPEN performed by Joss Martinez MD at [address]  2005   [address] with IOL OU    TUBAL LIGATION       Family History   Problem Relation Age of Onset    Heart Disease Father     High Blood Pressure Father      Social History     Socioeconomic History    Marital status: Single     Spouse name: Not on file    Number of children: Not on file    Years of education: Not on file    Highest education level: Not on file   Occupational History    Not on file   Tobacco Use    Smoking status: Former Smoker     Packs/day: 0.50     Years: 30.00     Pack years: 15.00     Quit date: 2015     Years since quittin.9    Smokeless tobacco: Never Used   Substance and Sexual Activity    Alcohol use: No    Drug use: No    Sexual activity: Not on file   Other Topics Concern    Not on file   Social History Narrative    Not on file     Social Determinants of Health     Financial Resource Strain:     Difficulty of Paying Living Expenses:    Food Insecurity:     Worried About Running Out of Food in the Last Year:     [address] in the Last Year:    Transportation Needs:     Lack of Transportation (Medical):  Lack of Transportation (Non-Medical):    Physical Activity:     Days of Exercise per Week:     Minutes of Exercise per Session:    Stress:     Feeling of Stress :    Social Connections:     Frequency of Communication with Friends and Family:     Frequency of Social Gatherings with Friends and Family:     Attends Alevism Services:     Active Member of Clubs or Organizations:     Attends Club or Organization Meetings:     Marital Status:    Intimate Partner Violence:     Fear of Current or Ex-Partner:     Emotionally Abused:     Physically Abused:     Sexually Abused:          Review of Systems   Constitutional: Negative for chills, diaphoresis, fatigue and fever.    HENT: Negative for congestion, mouth sores, nosebleeds, postnasal drip, rhinorrhea, sinus pressure, sneezing, sore throat, trouble swallowing and voice change. Eyes: Negative for discharge, itching and visual disturbance. Respiratory: Positive for cough, shortness of breath and wheezing. Negative for chest tightness. Cardiovascular: Negative for chest pain, palpitations and leg swelling. Gastrointestinal: Negative for abdominal pain, diarrhea, nausea and vomiting. Genitourinary: Negative for difficulty urinating and hematuria. Musculoskeletal: Negative for arthralgias, joint swelling and myalgias. Skin: Negative for rash. Allergic/Immunologic: Negative for environmental allergies and food allergies. Neurological: Negative for dizziness, tremors, weakness and headaches. Psychiatric/Behavioral: Negative for behavioral problems and sleep disturbance.         :     Vitals:    10/28/21 0831   BP: 138/82   Pulse: 63   Temp: 98.2 °F (36.8 °C)   SpO2: 99%   Weight: 128 lb 3.2 oz (58.2 kg)   Height: 5' 2\" (1.575 m)     Wt Readings from Last 3 Encounters:   10/28/21 128 lb 3.2 oz (58.2 kg)   09/17/21 125 lb 6.4 oz (56.9 kg)   05/17/21 137 lb (62.1 kg)         Physical Exam  Constitutional:       General: She is not in acute distress. Appearance: She is well-developed. She is not diaphoretic. HENT:      Head: Normocephalic and atraumatic. Nose: Nose normal.   Eyes:      Pupils: Pupils are equal, round, and reactive to light. Neck:      Thyroid: No thyromegaly. Vascular: No JVD. Trachea: No tracheal deviation. Cardiovascular:      Rate and Rhythm: Normal rate and regular rhythm. Heart sounds: No murmur heard. No friction rub. No gallop. Pulmonary:      Effort: No respiratory distress. Breath sounds: No wheezing or rales. Chest:      Chest wall: No tenderness. Abdominal:      General: There is no distension. Tenderness: There is no abdominal tenderness. There is no rebound. Musculoskeletal:         General: Normal range of motion.    Lymphadenopathy: Cervical: No cervical adenopathy. Skin:     General: Skin is warm and dry. Neurological:      Mental Status: She is alert and oriented to person, place, and time. Coordination: Coordination normal.         Current Outpatient Medications   Medication Sig Dispense Refill    Arformoterol Tartrate (BROVANA) 15 MCG/2ML NEBU Take 2 mLs by nebulization 2 times daily use 1 vial in nebulizer twice a day DX COPD J44.9 360 mL 3    hydrocortisone 2.5 % cream Apply topically 2 times daily x 2 weeks. 3.5 g 0    ketoconazole (NIZORAL) 2 % cream Apply topically daily x 2 weeks. 30 g 1    ipratropium (ATROVENT) 0.02 % nebulizer solution INHALE 1 VIAL VIA NEBUILZER 4 TIMES A  mL 0    albuterol (PROVENTIL) (2.5 MG/3ML) 0.083% nebulizer solution USE 1 VIAL IN NEBULIZER EVERY 4 HOURS AS NEEDED FOR WHEEZING 300 mL 0    albuterol sulfate  (90 Base) MCG/ACT inhaler Inhale 2 puffs into the lungs every 6 hours as needed for Wheezing 1 Inhaler 1    B Complex Vitamins (VITAMIN-B COMPLEX PO) Take 1 capsule by mouth daily      Handicap Placard [facility] by Does not apply route 5 yrs 1 each 0    Lifitegrast (XIIDRA) 5 % SOLN Apply to eye      albuterol sulfate HFA (PROVENTIL HFA) 108 (90 Base) MCG/ACT inhaler Inhale 2 puffs into the lungs every 6 hours as needed for Wheezing 1 Inhaler 3    Nebulizers (AIRIAL COMPACT MINI NEBULIZER) MISC PLEASE PROVIDE PATIENT WITH INNOSPIRE PORTABLE NEBULIZER UNIT. HEALTH CARE SOLUTIONS 1 each 0    Carboxymethylcell-Hypromellose (GENTEAL OP) Apply to eye      Respiratory Therapy Supplies (NEBULIZER AIR TUBE/PLUGS) MISC Nebulizer supply, cup and tubing 1 each 0    POTASSIUM PO Take by mouth      vitamin E 1000 units capsule Take 1,000 Units by mouth daily      magnesium 30 MG tablet Take 30 mg by mouth daily      vitamin B-12 (CYANOCOBALAMIN) 100 MCG tablet Take 50 mcg by mouth daily       No current facility-administered medications for this visit.        No results found for this or any previous visit.  ]  Results for orders placed during the hospital encounter of 05/03/17    XR Chest Portable    Narrative  PORTABLE CHEST: 5/3/2017    CLINICAL HISTORY: Shortness of breath. COMPARISON: 12/3/2015, and chest CT 1/17/2017. A portable upright AP radiograph of the chest was obtained. FINDINGS:    A shallow inspiration is present without significant infiltrate identified. The cardiac and mediastinal silhouettes appear within normal limits for the patient's age group and technique. There is no significant pleural effusion, vascular congestion, pneumothorax, or displaced fractures identified. Calcified prosthetic breast implants are again noted. Impression  POOR INSPIRATION WITHOUT EVIDENCE OF ACTIVE CARDIOPULMONARY DISEASE.  ]  No results found for this or any previous visit.  ]  Results for orders placed during the hospital encounter of 10/07/21    CT CHEST WO CONTRAST    Narrative  EXAMINATION: CT CHEST WITHOUT CONTRAST    CLINICAL HISTORY:R91.1 Lung nodule ICD10 , follow-up groundglass opacity right apex. COMPARISONS: None available. TECHNIQUE: TECHNIQUE: Contiguous axial images were obtained through the chest without contrast. Coronal and sagittal reformations were made. FINDINGS:  Lungs: There is a ground glass opacity in the right apex which measures approximately 16 x 19 x 21 mm mm in diameter and previously measured 13 x 17 x 18 mm. This is slightly increased in size but less than 30 mm in diameter. There is a small solid  component measuring 6 mm in diameter, unchanged. This is probably benign. Recommend follow up exam in 6 months. There is a 3.5 mm nodule in the lingula, adjacent to the major fissure, series 3 image 136. No other suspicious lung nodule. The ascending thoracic aorta measures 3.8 cm in diameter, upper limits of normal, unchanged. There are atherosclerotic calcifications in the aortic arch and descending thoracic aorta.  No significant mediastinal or hilar or axillary lymphadenopathy. No  pericardial effusion. No pleural effusion. No acute infiltrate. No pneumothorax. Bilateral breast implants which are peripherally calcified. Suspect at least partial bilateral breast implant rupture. Again seen is an 8 mm coarse calcification in the dome of the right lobe liver, unchanged. Bones are intact. Impression  MILDLY ENLARGING GROUND GLASS OPACITY RIGHT UPPER LOBE WITH A 6 MM SOLID COMPONENT. THIS IS PROBABLY BENIGN. RECOMMEND SIX-MONTH FOLLOW-UP EXAM.    NO ACUTE PROCESS. All CT scans at this facility use dose modulation, iterative reconstruction, and/or weight based dosing when appropriate to reduce radiation dose to as low as reasonably achievable. Results for orders placed during the hospital encounter of 10/15/20    CT CHEST WO CONTRAST    Narrative  EXAMINATION: CT CHEST WITHOUT CONTRAST    CLINICAL HISTORY:R91.1 Lung nodule ICD10, follow-up groundglass opacity right apex. COMPARISONS: None available. TECHNIQUE: TECHNIQUE: Contiguous axial images were obtained through the chest without contrast. Coronal and sagittal reformations were made. FINDINGS:  Lungs: There is a stable groundglass opacity in the right apex measuring approximately 13 mm. No significant interval change since 8/18. Recommend continued surveillance with CT in one to 2 years. There is a 3.5 mm nodule in the lingula, adjacent to the major fissure, series 3, image 133. This also is unchanged. No other lung nodule or acute infiltrate. Ascending thoracic aorta is at the upper limits of normal in size measuring 3.9 cm in transverse diameter, unchanged. There are atherosclerotic calcifications in the aorta and coronary arteries. No significant mediastinal or hilar or axillary  lymphadenopathy. No pericardial effusion. No pleural effusion. No cardiac enlargement. No pneumothorax.     8 mm coarse calcification with peripheral calcification within the right lobe liver near the dome of the liver. This is unchanged dating back to 8/18. Bones are intact. Peripherally calcified bilateral breast implants. Impression  STABLE 1.3 CM GROUNDGLASS OPACITY RIGHT APEX. STABLE 3.5 MM NODULE LINGULA. NO ACUTE PROCESS. RECOMMEND FOLLOW UP CT SCAN IN 1 TO 2 YEARS. All CT scans at this facility use dose modulation, iterative reconstruction, and/or weight based dosing when appropriate to reduce radiation dose to as low as reasonably achievable. Results for orders placed during the hospital encounter of 10/10/19    CT CHEST WO CONTRAST    Narrative  EXAMINATION: CT CHEST WO CONTRAST    DATE:10/10/2019 8:38 AM    CLINICAL HISTORY: R91.1 Lung nodule ICD10    COMPARISON:  March 7, 2019, August 16, 2018, August 8, 2017. TECHNIQUE: Helical CT was performed through the chest without IV contrast., All CT scans at this facility use dose modulation, iterative reconstruction, and/or weight based dosing when appropriate to reduce radiation dose to as low as reasonably  achievable. FINDINGS    Lungs: Stable groundglass opacity in the right apex measuring approximately 13 mm. No significant change since August 2018. Continued surveillance with CT can be performed in 1 to 2 years. There is a stable 3 mm left perifissural nodule. Mediastinum :Mediastinum and marivel are unremarkable. Pleura:Normal. No effusion or thickening    Vessels:Thoracic aorta is intact. Bones:Normal    Upper abdomen:No significant abnormality of the visualized structures of the upper abdomen    Impression  STABLE 1.3 CM GROUNDGLASS OPACITY RIGHT APEX. CONTINUED SURVEILLANCE CAN BE PERFORMED WITH CT IN 1 TO 2 YEARS. Assessment/Plan:     1. Lung nodule  CT chest done 10/7/21 , CT chest reviewed with patient . There is a ground glass opacity in the right apex which measures approximately 16 x 19 x 21 mm mm in diameter and previously measured 13 x 17 x 18 mm.  This is slightly increased in size but less than 30 mm in diameter. There is a small solid  component measuring 6 mm in diameter, unchanged. This is probably benign. Recommend follow up exam in 6 months. She is agreeable to have f/u Ct chest in 6 month   - CT CHEST WO CONTRAST; Future    2. Chronic obstructive pulmonary disease, unspecified [facility]  She is using Brovana nebulizer twice a day and nebulizer with albuterol and ipratropium 4 times a day when necessary and proair HFA 2 puffs when necessary. C/o shortness of breath with exertion. On and off Wheezing. No Cough. Continue bronchodilator as before. - Arformoterol Tartrate (BROVANA) 15 MCG/2ML NEBU; Take 2 mLs by nebulization 2 times daily use 1 vial in nebulizer twice a day DX COPD J44.9  Dispense: 360 mL; Refill: 3      Return in about 6 months (around 4/28/2022) for COPD, lung nodule f/u.       Estela Mcleod MD

## 2021-10-29 ENCOUNTER — TELEPHONE (OUTPATIENT)
Dept: PULMONOLOGY | Age: 61
End: 2021-10-29

## 2021-12-14 ENCOUNTER — TELEPHONE (OUTPATIENT)
Dept: FAMILY MEDICINE CLINIC | Age: 61
End: 2021-12-14

## 2021-12-14 DIAGNOSIS — E55.9 VITAMIN D DEFICIENCY: Primary | ICD-10-CM

## 2021-12-14 NOTE — TELEPHONE ENCOUNTER
[facility] calling requesting a out of network waiver exception for pt to see Dr. Yariel Galan.       [address] [place] [address] [address] Ph:[phone]

## 2021-12-14 NOTE — TELEPHONE ENCOUNTER
----- Message from Mary Solo sent at 12/14/2021  8:45 AM EST -----  Subject: Referral Request    QUESTIONS   Reason for referral request? patient would like another vitamin D test.   She wants the exact same order from 08/18/2021. Subtype? Vitamin D 25   Hydroxy. Order Display Name? Vitamin D 25 Hydroxy. Has the physician seen you for this condition before? Yes  Select a date? 2021-08-11  Select the Provider the patient wants to be referred to, if known (PCP or   Specialist)? Outside Physician - [facility]   Preferred Specialist (if applicable)? Do you already have an appointment scheduled? No  Additional Information for Provider? Would like tests done at [facility] if possible.   ---------------------------------------------------------------------------  --------------  [address]  What is the best way for the office to contact you? OK to leave message on   voicemail  Preferred Call Back Phone Number?  [phone]

## 2021-12-15 NOTE — TELEPHONE ENCOUNTER
Not sure if this is same problem as may 2020 when Pippa Hawkins referred her for vision disturbance?    That would need to be clarified

## 2021-12-15 NOTE — TELEPHONE ENCOUNTER
This means that that optometrist is out of network and I will need to send an auth in order for them to be seen, but my question is have we seen the patient for the issue?

## 2021-12-16 ENCOUNTER — HOSPITAL ENCOUNTER (OUTPATIENT)
Dept: LAB | Age: 61
Discharge: HOME | End: 2021-12-16
Payer: MEDICARE

## 2021-12-16 LAB
CHOLESTEROL, TOTAL: 148 MG/DL (ref 0–199)
HDLC SERPL-MCNC: 67 MG/DL (ref 40–59)
LDL CHOLESTEROL CALCULATED: 72 MG/DL (ref 0–129)
TRIGL SERPL-MCNC: 45 MG/DL (ref 0–150)

## 2021-12-16 PROCEDURE — 80061 LIPID PANEL: CPT

## 2021-12-16 PROCEDURE — 82306 VITAMIN D 25 HYDROXY: CPT

## 2021-12-17 LAB — VITAMIN D 25-HYDROXY: 89.4 NG/ML (ref 30–100)

## 2021-12-19 DIAGNOSIS — J44.9 CHRONIC OBSTRUCTIVE PULMONARY DISEASE, UNSPECIFIED COPD TYPE (HCC): ICD-10-CM

## 2021-12-21 ENCOUNTER — TELEPHONE (OUTPATIENT)
Dept: FAMILY MEDICINE CLINIC | Age: 61
End: 2021-12-21

## 2021-12-21 DIAGNOSIS — H57.9 EYE PROBLEM: Primary | ICD-10-CM

## 2021-12-21 DIAGNOSIS — J44.9 CHRONIC OBSTRUCTIVE PULMONARY DISEASE, UNSPECIFIED COPD TYPE (HCC): Primary | ICD-10-CM

## 2021-12-21 RX ORDER — NEBULIZER ACCESSORIES
EACH MISCELLANEOUS
Qty: 1 EACH | Refills: 5 | Status: SHIPPED | OUTPATIENT
Start: 2021-12-21

## 2021-12-21 NOTE — TELEPHONE ENCOUNTER
Patient has called in and states is does not have anything to do with may 2020.  She states that dr jin needs to sign off on waivers in order to be seen by dr Anisha Reyes

## 2021-12-21 NOTE — TELEPHONE ENCOUNTER
Dr Facundo Burgess I have started the referral for Wenceslao Serrano. She has an HMO that needs an auth for her to go to the eye dr please finish the referral so I can obtain that the Leslye Martinez. Thank you!

## 2021-12-22 RX ORDER — ALBUTEROL SULFATE 2.5 MG/3ML
SOLUTION RESPIRATORY (INHALATION)
Qty: 300 ML | Refills: 0 | Status: SHIPPED | OUTPATIENT
Start: 2021-12-22 | End: 2022-06-02

## 2021-12-22 NOTE — TELEPHONE ENCOUNTER
If patient calls back I has submitted an auth to her [employer] and they state no auth required. I did try and call steph but she does not have a VM.

## 2021-12-23 ENCOUNTER — TELEPHONE (OUTPATIENT)
Dept: FAMILY MEDICINE CLINIC | Age: 61
End: 2021-12-23

## 2021-12-23 NOTE — TELEPHONE ENCOUNTER
----- Message from Jaky Potts sent at 12/23/2021 10:35 AM EST -----  Subject: Message to Provider    QUESTIONS  Information for Provider? Patient is wanting to know if Dr. oJe Germain sent   her referral to [facility]. please advise.  ---------------------------------------------------------------------------  --------------  CALL BACK INFO  What is the best way for the office to contact you? OK to leave message on   voicemail  Preferred Call Back Phone Number? [phone]  ---------------------------------------------------------------------------  --------------  SCRIPT ANSWERS  Relationship to Patient?  Self

## 2021-12-23 NOTE — TELEPHONE ENCOUNTER
Tried calling pt to see which referral she is talking about sending to [facility], no voicemail and no answer.

## 2021-12-30 ENCOUNTER — TELEPHONE (OUTPATIENT)
Dept: FAMILY MEDICINE CLINIC | Age: 61
End: 2021-12-30

## 2021-12-30 NOTE — TELEPHONE ENCOUNTER
Patient called requesting that her referral to Dr. Bridget Cabrales be faxed to [facility] per their request.  Thank you.

## 2021-12-30 NOTE — TELEPHONE ENCOUNTER
----- Message from Blayne Monae sent at 12/30/2021 10:12 AM EST -----  Subject: Message to Provider    QUESTIONS  Information for Provider? Patient said she just got off the phone with a   [employer] person and they told her she needs preauthorization to see Dr. Twanda Castleman for eye care.  ---------------------------------------------------------------------------  --------------  Ry HERNADEZ  What is the best way for the office to contact you? Do not leave any   message, patient will call back for answer  Preferred Call Back Phone Number? [phone]  ---------------------------------------------------------------------------  --------------  SCRIPT ANSWERS  Relationship to Patient?  Self

## 2022-03-14 ENCOUNTER — HOSPITAL ENCOUNTER (OUTPATIENT)
Dept: LAB | Age: 62
Discharge: HOME | End: 2022-03-14
Payer: MEDICARE

## 2022-03-14 LAB
CHOLESTEROL, TOTAL: 181 MG/DL (ref 0–199)
HDLC SERPL-MCNC: 67 MG/DL (ref 40–59)
LDL CHOLESTEROL CALCULATED: 91 MG/DL (ref 0–129)
TRIGL SERPL-MCNC: 116 MG/DL (ref 0–150)

## 2022-03-14 PROCEDURE — 80061 LIPID PANEL: CPT

## 2022-03-17 ENCOUNTER — OFFICE VISIT (OUTPATIENT)
Dept: FAMILY MEDICINE CLINIC | Age: 62
End: 2022-03-17
Payer: MEDICARE

## 2022-03-17 VITALS
SYSTOLIC BLOOD PRESSURE: 120 MMHG | OXYGEN SATURATION: 95 % | HEART RATE: 77 BPM | TEMPERATURE: 97.9 F | HEIGHT: 62 IN | RESPIRATION RATE: 16 BRPM | BODY MASS INDEX: 24.84 KG/M2 | WEIGHT: 135 LBS | DIASTOLIC BLOOD PRESSURE: 82 MMHG

## 2022-03-17 DIAGNOSIS — E78.00 PURE HYPERCHOLESTEROLEMIA: ICD-10-CM

## 2022-03-17 DIAGNOSIS — K62.5 RECTAL BLEEDING: ICD-10-CM

## 2022-03-17 DIAGNOSIS — J44.9 CHRONIC OBSTRUCTIVE PULMONARY DISEASE, UNSPECIFIED COPD TYPE (HCC): ICD-10-CM

## 2022-03-17 DIAGNOSIS — I65.23 BILATERAL CAROTID ARTERY STENOSIS: Primary | ICD-10-CM

## 2022-03-17 PROCEDURE — 3023F SPIROM DOC REV: CPT | Performed by: FAMILY MEDICINE

## 2022-03-17 PROCEDURE — 1036F TOBACCO NON-USER: CPT | Performed by: FAMILY MEDICINE

## 2022-03-17 PROCEDURE — 3017F COLORECTAL CA SCREEN DOC REV: CPT | Performed by: FAMILY MEDICINE

## 2022-03-17 PROCEDURE — G8482 FLU IMMUNIZE ORDER/ADMIN: HCPCS | Performed by: FAMILY MEDICINE

## 2022-03-17 PROCEDURE — G8420 CALC BMI NORM PARAMETERS: HCPCS | Performed by: FAMILY MEDICINE

## 2022-03-17 PROCEDURE — G8427 DOCREV CUR MEDS BY ELIG CLIN: HCPCS | Performed by: FAMILY MEDICINE

## 2022-03-17 PROCEDURE — 99214 OFFICE O/P EST MOD 30 MIN: CPT | Performed by: FAMILY MEDICINE

## 2022-03-17 SDOH — ECONOMIC STABILITY: FOOD INSECURITY: WITHIN THE PAST 12 MONTHS, THE FOOD YOU BOUGHT JUST DIDN'T LAST AND YOU DIDN'T HAVE MONEY TO GET MORE.: NEVER TRUE

## 2022-03-17 SDOH — ECONOMIC STABILITY: FOOD INSECURITY: WITHIN THE PAST 12 MONTHS, YOU WORRIED THAT YOUR FOOD WOULD RUN OUT BEFORE YOU GOT MONEY TO BUY MORE.: NEVER TRUE

## 2022-03-17 ASSESSMENT — ENCOUNTER SYMPTOMS: HEMATOCHEZIA: 1

## 2022-03-17 ASSESSMENT — PATIENT HEALTH QUESTIONNAIRE - PHQ9
1. LITTLE INTEREST OR PLEASURE IN DOING THINGS: 0
SUM OF ALL RESPONSES TO PHQ QUESTIONS 1-9: 0
2. FEELING DOWN, DEPRESSED OR HOPELESS: 0
SUM OF ALL RESPONSES TO PHQ QUESTIONS 1-9: 0
SUM OF ALL RESPONSES TO PHQ QUESTIONS 1-9: 0
SUM OF ALL RESPONSES TO PHQ9 QUESTIONS 1 & 2: 0
SUM OF ALL RESPONSES TO PHQ QUESTIONS 1-9: 0

## 2022-03-17 ASSESSMENT — SOCIAL DETERMINANTS OF HEALTH (SDOH): HOW HARD IS IT FOR YOU TO PAY FOR THE VERY BASICS LIKE FOOD, HOUSING, MEDICAL CARE, AND HEATING?: NOT HARD AT ALL

## 2022-03-17 NOTE — PROGRESS NOTES
Subjective:      Patient ID: Grupo Dupree is a 59 y.o. female    Rectal Bleeding   The current episode started 5 to 7 days ago. Hyperlipidemia  This is a chronic problem. The current episode started more than 1 year ago. The problem is uncontrolled. Chaperone accepted -ma  Here in follow up for lipids and carotid disease. Admits diet not good over the winter. Activity level was very low. Weight up 10 pounds since last time  Did have some rectal bleeding about 4 days ago. Not had colonoscopy done in over 10 years. Did have some rectal pain. Bright red blood with wiping. No bleeding last 2 days. Review of Systems   Constitutional: Negative for chills. Gastrointestinal: Positive for hematochezia. Neurological: Negative for weakness. Reviewed allergy, medical, social, surgical, family and med list changes and updated   Files--reviewed blood work with elevated lipids. Social History     Socioeconomic History    Marital status: Single     Spouse name: Not on file    Number of children: Not on file    Years of education: Not on file    Highest education level: Not on file   Occupational History    Not on file   Tobacco Use    Smoking status: Former Smoker     Packs/day: 0.50     Years: 30.00     Pack years: 15.00     Quit date: 2015     Years since quittin.2    Smokeless tobacco: Never Used   Substance and Sexual Activity    Alcohol use: No    Drug use: No    Sexual activity: Not on file   Other Topics Concern    Not on file   Social History Narrative    Not on file     Social Determinants of Health     Financial Resource Strain: Low Risk     Difficulty of Paying Living Expenses: Not hard at all   Food Insecurity: No Food Insecurity    Worried About [address] in the Last Year: Never true    [address] in the Last Year: Never true   Transportation Needs:     Lack of Transportation (Medical): Not on file    Lack of Transportation (Non-Medical):  Not on file Physical Activity:     Days of Exercise per Week: Not on file    Minutes of Exercise per Session: Not on file   Stress:     Feeling of Stress : Not on file   Social Connections:     Frequency of Communication with Friends and Family: Not on file    Frequency of Social Gatherings with Friends and Family: Not on file    Attends Oriental orthodox Services: Not on file    Active Member of [address] [place] [address] or Organizations: Not on file    Attends Club or Organization Meetings: Not on file    Marital Status: Not on file   Intimate Partner Violence:     Fear of Current or Ex-Partner: Not on file    Emotionally Abused: Not on file    Physically Abused: Not on file    Sexually Abused: Not on file   Housing Stability:     Unable to Pay for Housing in the Last Year: Not on file    Number of [place] in the Last Year: Not on file    Unstable Housing in the Last Year: Not on file     Current Outpatient Medications   Medication Sig Dispense Refill    albuterol (PROVENTIL) (2.5 MG/3ML) 0.083% nebulizer solution USE 1 VIAL IN NEBULIZER EVERY 4 HOURS AS NEEDED FOR WHEEZING 300 mL 0    ipratropium (ATROVENT) 0.02 % nebulizer solution INHALE 1 VIAL VIA NEBUILZER 4 TIMES A  mL 0    Respiratory Therapy Supplies (NEBULIZER AIR TUBE/PLUGS) MISC Nebulizer supply, cup and tubing   2 reusable nebulizer cup 1 each 5    Arformoterol Tartrate (BROVANA) 15 MCG/2ML NEBU Take 2 mLs by nebulization 2 times daily use 1 vial in nebulizer twice a day DX COPD J44.9 360 mL 3    albuterol sulfate  (90 Base) MCG/ACT inhaler Inhale 2 puffs into the lungs every 6 hours as needed for Wheezing 1 each 5    hydrocortisone 2.5 % cream Apply topically 2 times daily x 2 weeks. 3.5 g 0    ketoconazole (NIZORAL) 2 % cream Apply topically daily x 2 weeks.  30 g 1    B Complex Vitamins (VITAMIN-B COMPLEX PO) Take 1 capsule by mouth daily      Handicap Placard MISC by Does not apply route 5 yrs 1 each 0    Lifitegrast (XIIDRA) 5 % SOLN Apply to eye      albuterol sulfate HFA (PROVENTIL HFA) 108 (90 Base) MCG/ACT inhaler Inhale 2 puffs into the lungs every 6 hours as needed for Wheezing 1 Inhaler 3    Nebulizers (AIRIAL COMPACT MINI NEBULIZER) MISC PLEASE PROVIDE PATIENT WITH INNOSPIRE PORTABLE NEBULIZER UNIT. HEALTH CARE SOLUTIONS 1 each 0    Carboxymethylcell-Hypromellose (GENTEAL OP) Apply to eye      POTASSIUM PO Take by mouth      vitamin E 1000 units capsule Take 1,000 Units by mouth daily      magnesium 30 MG tablet Take 30 mg by mouth daily      vitamin B-12 (CYANOCOBALAMIN) 100 MCG tablet Take 50 mcg by mouth daily       No current facility-administered medications for this visit. Family History   Problem Relation Age of Onset    Heart Disease Father     High Blood Pressure Father      Past Medical History:   Diagnosis Date    Acute respiratory failure ([address]     Arthritis     general joints    Cataracts, bilateral 05/03/2017    COPD (chronic obstructive pulmonary disease) (HCC)     Fracture of coccyx (HCC)     History of non-ST elevation myocardial infarction (NSTEMI) 3/17/2016    History of tobacco abuse     past smoker / quit > 2 yrs    HTN (hypertension)     past trx    Lung disease     Tendinitis of wrist     BIALTERAL     Objective:   /82   Pulse 77   Temp 97.9 °F (36.6 °C)   Resp 16   Ht 5' 2\" (1.575 m)   Wt 135 lb (61.2 kg)   SpO2 95%   BMI 24.69 kg/m²     Physical Exam  Neck:no carotid bruits. No masses. No adenopathy. No thyroid asymmetry. Lungs:clear and equal breath sounds. No wheezes or rales. Heart:rate reg. No murmur. No gallops   Pulses:Radials 2+ equal               D.P 1+ equal   Extremities: trace edema of both legs   Gen: In no acute distress  Abdomen; B.S present. Soft  Non tender. No hepatosplenomegaly. No masses   Rectal;       No visible fissures or external hemorrhoids. No palpable masses. Stool brown   Assessment:           Diagnosis Orders   1.  Bilateral carotid artery stenosis  US CAROTID ARTERY BILATERAL   2. Pure hypercholesterolemia  Lipid Panel    Comprehensive Metabolic Panel    CBC with Auto Differential   3. Rectal bleeding     4.  Chronic obstructive pulmonary disease, unspecified COPD type ([address]          Plan:    copd fairly stable and followed by pulmonary-continue current tx   Orders Placed This Encounter   Procedures    US CAROTID ARTERY BILATERAL     Standing Status:   Future     Standing Expiration Date:   3/17/2023    Lipid Panel     Standing Status:   Future     Standing Expiration Date:   3/17/2023     Order Specific Question:   Is Patient Fasting?/# of Hours     Answer:   9    Comprehensive Metabolic Panel     Standing Status:   Future     Standing Expiration Date:   3/17/2023    CBC with Auto Differential     Standing Status:   Future     Standing Expiration Date:   3/17/2023   Ang Arriaga MD, Gastroenterology, [place]     Referral Priority:   Routine     Referral Type:   Eval and Treat     Referral Reason:   Specialty Services Required     Referred to Provider:   Angelo Meléndez MD     Requested Specialty:   Gastroenterology     Number of Visits Requested:   1   fasting blood work in July and f/u after done -patient would like to work on diet and activity before starting lipid lowering agent continue current management and present  medications ,cardiology consult called

## 2022-04-07 ENCOUNTER — HOSPITAL ENCOUNTER (OUTPATIENT)
Dept: CT IMAGING | Age: 62
Discharge: HOME | End: 2022-04-09
Payer: MEDICARE

## 2022-04-07 DIAGNOSIS — R91.1 LUNG NODULE: ICD-10-CM

## 2022-04-07 PROCEDURE — 71250 CT THORAX DX C-: CPT

## 2022-04-21 ENCOUNTER — TELEPHONE (OUTPATIENT)
Dept: FAMILY MEDICINE CLINIC | Age: 62
End: 2022-04-21

## 2022-04-21 DIAGNOSIS — Z12.31 ENCOUNTER FOR SCREENING MAMMOGRAM FOR MALIGNANT NEOPLASM OF BREAST: Primary | ICD-10-CM

## 2022-04-21 NOTE — TELEPHONE ENCOUNTER
Pt inquired if she should have an ultrasound on her breasts since she doesn't do the mammogram and requests an order if possible. Statement Selected

## 2022-04-22 NOTE — TELEPHONE ENCOUNTER
Can have bilateral breast u/s but not very good screening tool and not sure if insurance will cover this

## 2022-04-28 ENCOUNTER — OFFICE VISIT (OUTPATIENT)
Dept: PULMONOLOGY | Age: 62
End: 2022-04-28
Payer: MEDICARE

## 2022-04-28 VITALS
BODY MASS INDEX: 23.92 KG/M2 | WEIGHT: 130 LBS | HEIGHT: 62 IN | HEART RATE: 61 BPM | DIASTOLIC BLOOD PRESSURE: 69 MMHG | TEMPERATURE: 98.2 F | OXYGEN SATURATION: 97 % | SYSTOLIC BLOOD PRESSURE: 127 MMHG

## 2022-04-28 DIAGNOSIS — J44.9 CHRONIC OBSTRUCTIVE PULMONARY DISEASE, UNSPECIFIED COPD TYPE (HCC): ICD-10-CM

## 2022-04-28 DIAGNOSIS — R91.1 NODULE OF RIGHT LUNG: Primary | ICD-10-CM

## 2022-04-28 PROCEDURE — G8427 DOCREV CUR MEDS BY ELIG CLIN: HCPCS | Performed by: INTERNAL MEDICINE

## 2022-04-28 PROCEDURE — 1036F TOBACCO NON-USER: CPT | Performed by: INTERNAL MEDICINE

## 2022-04-28 PROCEDURE — 99214 OFFICE O/P EST MOD 30 MIN: CPT | Performed by: INTERNAL MEDICINE

## 2022-04-28 PROCEDURE — 3023F SPIROM DOC REV: CPT | Performed by: INTERNAL MEDICINE

## 2022-04-28 PROCEDURE — 3017F COLORECTAL CA SCREEN DOC REV: CPT | Performed by: INTERNAL MEDICINE

## 2022-04-28 PROCEDURE — G8420 CALC BMI NORM PARAMETERS: HCPCS | Performed by: INTERNAL MEDICINE

## 2022-04-28 ASSESSMENT — ENCOUNTER SYMPTOMS
VOICE CHANGE: 0
EYE ITCHING: 0
TROUBLE SWALLOWING: 0
DIARRHEA: 0
CHEST TIGHTNESS: 0
ABDOMINAL PAIN: 0
RHINORRHEA: 0
NAUSEA: 0
SHORTNESS OF BREATH: 1
VOMITING: 0
EYE DISCHARGE: 0
SORE THROAT: 0
SINUS PRESSURE: 0
WHEEZING: 1
COUGH: 1

## 2022-04-28 NOTE — PROGRESS NOTES
C3 nurse attempted to contact patient. No answer. The following message was left for the patient to return the call:  Good afternnoon  I am a nurse calling on behalf of [facility] from the [facility].  This is a Transitional Care Call for Mina Jo . When you have a moment please contact us at [phone] or [phone] Monday through Friday, between the hours of 8 am to 4 pm. We look forward to speaking with you. On behalf of [facility] have a nice day.    The patient has a scheduled HOSFU appointment with Jaelyn Huerta on 10\5\17 @ 1000. Message sent to Physician staff.       Subjective:     Gustavo Manzano is a 59 y.o. female who complains today of:     Chief Complaint   Patient presents with    COPD     6 month f/u       HPI  Ct chest done and came for f/u   NO SIGNIFICANT CHANGE IN NONSPECIFIC AREA ROUND GLASS LESION IN THE RIGHT UPPER LOBE. SUSPICION REMAINS HIGH FOR MALIGNANCY. CONTINUED FOLLOW-UP IS STRONGLY RECOMMENDED. She is using Brovana nebulizer twice a day , not using all the time. and nebulizer with albuterol and ipratropium 4 times a day when necessary and proair HFA 2 puffs when necessary. C/o shortness of breath with exertion. Occasional Wheezing. No Cough  No Hemoptysis. No Chest tightness. No Chest pain with radiation or pleuritic pain. No  leg edema. No orthopnea. No Fever or chills. No Rhinorrhea and postnasal drip. Allergies:  Pcn [penicillins], Corticosteroids, Lidocaine, Contrast [iodides], Glycerin, Naprosyn [naproxen], and Valium [diazepam]  Past Medical History:   Diagnosis Date    Acute respiratory failure ([address]     Arthritis     general joints    Cataracts, bilateral 05/03/2017    COPD (chronic obstructive pulmonary disease) (HCC)     Fracture of coccyx (HCC)     History of non-ST elevation myocardial infarction (NSTEMI) 3/17/2016    History of tobacco abuse     past smoker / quit > 2 yrs    HTN (hypertension)     past trx    Lung disease     Tendinitis of wrist     BIALTERAL     Past Surgical History:   Procedure Laterality Date    BREAST SURGERY      bilateral implants    CARDIAC CATHETERIZATION  3/2016    CATARACT REMOVAL Left 10/12/15     DR. SANTIAGO    CATARACT REMOVAL WITH IMPLANT Right 11/2/15       [facility]    CHOLECYSTECTOMY, LAPAROSCOPIC N/A 7/14/2017    CHOLECYSTECTOMY LAPAROSCOPIC - CHOLANGIOGRAM POSS OPEN performed by Marco Antonio Gutierrez MD at [facility]  2005    EYE SURGERY      [place] with IOL OU    TUBAL LIGATION       Family History   Problem Relation Age of Onset    Heart Disease Father     High Blood Pressure Father      Social History     Socioeconomic History    Marital status: Single     Spouse name: Not on file    Number of children: Not on file    Years of education: Not on file    Highest education level: Not on file   Occupational History    Not on file   Tobacco Use    Smoking status: Former Smoker     Packs/day: 0.50     Years: 30.00     Pack years: 15.00     Quit date: 2015     Years since quittin.4    Smokeless tobacco: Never Used   Substance and Sexual Activity    Alcohol use: No    Drug use: No    Sexual activity: Not on file   Other Topics Concern    Not on file   Social History Narrative    Not on file     Social Determinants of Health     Financial Resource Strain: Low Risk     Difficulty of Paying Living Expenses: Not hard at all   Food Insecurity: No Food Insecurity    Worried About [address] in the Last Year: Never true    [address] [employer] in the Last Year: Never true   Transportation Needs:     Lack of Transportation (Medical): Not on file    Lack of Transportation (Non-Medical):  Not on file   Physical Activity:     Days of Exercise per Week: Not on file    Minutes of Exercise per Session: Not on file   Stress:     Feeling of Stress : Not on file   Social Connections:     Frequency of Communication with Friends and Family: Not on file    Frequency of Social Gatherings with Friends and Family: Not on file    Attends Synagogue Services: Not on file    Active Member of Clubs or Organizations: Not on file    Attends Club or Organization Meetings: Not on file    Marital Status: Not on file   Intimate Partner Violence:     Fear of Current or Ex-Partner: Not on file    Emotionally Abused: Not on file    Physically Abused: Not on file    Sexually Abused: Not on file   Housing Stability:     Unable to Pay for Housing in the Last Year: Not on file    Number of [place] in the Last Year: Not on file    Unstable Housing in the Last Year: Not on file         Review of Systems   Constitutional: Negative for chills, diaphoresis, fatigue and fever. HENT: Negative for congestion, mouth sores, nosebleeds, postnasal drip, rhinorrhea, sinus pressure, sneezing, sore throat, trouble swallowing and voice change. Eyes: Negative for discharge, itching and visual disturbance. Respiratory: Positive for cough, shortness of breath and wheezing. Negative for chest tightness. Cardiovascular: Negative for chest pain, palpitations and leg swelling. Gastrointestinal: Negative for abdominal pain, diarrhea, nausea and vomiting. Genitourinary: Negative for difficulty urinating and hematuria. Musculoskeletal: Negative for arthralgias, joint swelling and myalgias. Skin: Negative for rash. Allergic/Immunologic: Negative for environmental allergies and food allergies. Neurological: Negative for dizziness, tremors, weakness and headaches. Psychiatric/Behavioral: Negative for behavioral problems and sleep disturbance.         :     Vitals:    04/28/22 0847   BP: 127/69   Pulse: 61   Temp: 98.2 °F (36.8 °C)   SpO2: 97%   Weight: 130 lb (59 kg)   Height: 5' 2\" (1.575 m)     Wt Readings from Last 3 Encounters:   04/28/22 130 lb (59 kg)   03/17/22 135 lb (61.2 kg)   10/28/21 128 lb 3.2 oz (58.2 kg)         Physical Exam  Constitutional:       General: She is not in acute distress. Appearance: She is well-developed. She is not diaphoretic. HENT:      Head: Normocephalic and atraumatic. Nose: Nose normal.   Eyes:      Pupils: Pupils are equal, round, and reactive to light. Neck:      Thyroid: No thyromegaly. Vascular: No JVD. Trachea: No tracheal deviation. Cardiovascular:      Rate and Rhythm: Normal rate and regular rhythm. Heart sounds: No murmur heard. No friction rub. No gallop. Pulmonary:      Effort: No respiratory distress. Breath sounds: No wheezing or rales.       Comments: diminished Breath sound bilaterally. Chest:      Chest wall: No tenderness. Abdominal:      General: There is no distension. Tenderness: There is no abdominal tenderness. There is no rebound. Musculoskeletal:         General: Normal range of motion. Lymphadenopathy:      Cervical: No cervical adenopathy. Skin:     General: Skin is warm and dry. Neurological:      Mental Status: She is alert and oriented to person, place, and time. Coordination: Coordination normal.         Current Outpatient Medications   Medication Sig Dispense Refill    albuterol (PROVENTIL) (2.5 MG/3ML) 0.083% nebulizer solution USE 1 VIAL IN NEBULIZER EVERY 4 HOURS AS NEEDED FOR WHEEZING 300 mL 0    ipratropium (ATROVENT) 0.02 % nebulizer solution INHALE 1 VIAL VIA NEBUILZER 4 TIMES A  mL 0    Respiratory Therapy Supplies (NEBULIZER AIR TUBE/PLUGS) MISC Nebulizer supply, cup and tubing   2 reusable nebulizer cup 1 each 5    Arformoterol Tartrate (BROVANA) 15 MCG/2ML NEBU Take 2 mLs by nebulization 2 times daily use 1 vial in nebulizer twice a day DX COPD J44.9 360 mL 3    hydrocortisone 2.5 % cream Apply topically 2 times daily x 2 weeks. 3.5 g 0    ketoconazole (NIZORAL) 2 % cream Apply topically daily x 2 weeks. 30 g 1    B Complex Vitamins (VITAMIN-B COMPLEX PO) Take 1 capsule by mouth daily      Handicap Placard [facility] by Does not apply route 5 yrs 1 each 0    Lifitegrast (XIIDRA) 5 % SOLN Apply to eye      albuterol sulfate HFA (PROVENTIL HFA) 108 (90 Base) MCG/ACT inhaler Inhale 2 puffs into the lungs every 6 hours as needed for Wheezing 1 Inhaler 3    Nebulizers (AIRIAL COMPACT MINI NEBULIZER) MISC PLEASE PROVIDE PATIENT WITH [employer] PORTABLE NEBULIZER UNIT.   HEALTH CARE SOLUT[employer] 1 each 0    Carboxymethylcell-Hypromellose (GENTEAL OP) Apply to eye      POTASSIUM PO Take by mouth      magnesium 30 MG tablet Take 30 mg by mouth daily      vitamin B-12 (CYANOCOBALAMIN) 100 MCG tablet Take 50 mcg by mouth daily      vitamin E 1000 units capsule Take 1,000 Units by mouth daily (Patient not taking: Reported on 4/28/2022)       No current facility-administered medications for this visit. No results found for this or any previous visit.  ]  Results for orders placed during the hospital encounter of 05/03/17    XR Chest Portable    Narrative  PORTABLE CHEST: 5/3/2017    CLINICAL HISTORY: Shortness of breath. COMPARISON: 12/3/2015, and chest CT 1/17/2017. A portable upright AP radiograph of the chest was obtained. FINDINGS:    A shallow inspiration is present without significant infiltrate identified. The cardiac and mediastinal silhouettes appear within normal limits for the patient's age group and technique. There is no significant pleural effusion, vascular congestion, pneumothorax, or displaced fractures identified. Calcified prosthetic breast implants are again noted. Impression  POOR INSPIRATION WITHOUT EVIDENCE OF ACTIVE CARDIOPULMONARY DISEASE.  ]  No results found for this or any previous visit.  ]  Results for orders placed during the hospital encounter of 04/07/22    CT CHEST WO CONTRAST    Narrative  EXAMINATION: CT CHEST WO CONTRAST    DATE:4/7/2022 7:56 AM    CLINICAL HISTORY: ANTERIOR CHEST PAIN. SHORTNESS OF BREATH. R91.1 LUNG NODULE ICD10    COMPARISON:  OCTOBER 7, 2021, OCTOBER 15, 2020, AND OCTOBER 10, 2019. TECHNIQUE: Helical CT was performed through the chest without IV contrast.    , All CT scans at this facility use dose modulation, iterative reconstruction, and/or weight based dosing when appropriate to reduce radiation dose to as low as reasonably achievable. Some of this report was completed using [employer]ribe 053  [id number] and may include unintended errors with respect to translation of words, typographical errors or grammatical errors which may not have been identified prior to the finalization of this report.       FINDINGS:    Lungs: Persistent area of groundglass opacity with a minimal solid component of less than 6 mm, (this is considered subsolid) this lesion now measures approximately 2.0 x 2.4 cm compared to 1.9 x 2.1 cm without any significant solid component. While  this lesion is relatively stable without statistically significant change when compared to the most recent CT from October 7, 2021 there has been definite increase in size when compared to the scans of October 10, 2019. While the absence of a definite  solid component is somewhat reassuring, close follow-up of this lesion is recommended. Growth of this type of lesion can be relatively slow and occur over several years. The primary differential for this lesion includes an inflammatory nodule or  granuloma which typically would improve on follow-up imaging, versus atypical adenomatous hyperplasia or early stage of adenocarcinoma. Consider CT follow-up in 6 months. 4 mm perifissural nodule again noted at the left base. Lungs are otherwise clear. Pleura:Normal. No effusion or thickening    Mediastinum :Mediastinum and marivel are unremarkable. Vessels:Thoracic aorta is intact. Bones:No acute osseous abnormalities. Other:None    Impression  NO SIGNIFICANT CHANGE IN NONSPECIFIC AREA ROUND GLASS LESION IN THE RIGHT UPPER LOBE. SUSPICION REMAINS HIGH FOR MALIGNANCY. CONTINUED FOLLOW-UP IS STRONGLY RECOMMENDED      Results for orders placed during the hospital encounter of 10/07/21    CT CHEST WO CONTRAST    Narrative  EXAMINATION: CT CHEST WITHOUT CONTRAST    CLINICAL HISTORY:R91.1 Lung nodule ICD10 , follow-up groundglass opacity right apex. COMPARISONS: None available. TECHNIQUE: TECHNIQUE: Contiguous axial images were obtained through the chest without contrast. Coronal and sagittal reformations were made. FINDINGS:  Lungs: There is a ground glass opacity in the right apex which measures approximately 16 x 19 x 21 mm mm in diameter and previously measured 13 x 17 x 18 mm.  This is slightly increased in size but less than 30 mm in diameter. There is a small solid  component measuring 6 mm in diameter, unchanged. This is probably benign. Recommend follow up exam in 6 months. There is a 3.5 mm nodule in the lingula, adjacent to the major fissure, series 3 image 136. No other suspicious lung nodule. The ascending thoracic aorta measures 3.8 cm in diameter, upper limits of normal, unchanged. There are atherosclerotic calcifications in the aortic arch and descending thoracic aorta. No significant mediastinal or hilar or axillary lymphadenopathy. No  pericardial effusion. No pleural effusion. No acute infiltrate. No pneumothorax. Bilateral breast implants which are peripherally calcified. Suspect at least partial bilateral breast implant rupture. Again seen is an 8 mm coarse calcification in the dome of the right lobe liver, unchanged. Bones are intact. Impression  MILDLY ENLARGING GROUND GLASS OPACITY RIGHT UPPER LOBE WITH A 6 MM SOLID COMPONENT. THIS IS PROBABLY BENIGN. RECOMMEND SIX-MONTH FOLLOW-UP EXAM.    NO ACUTE PROCESS. All CT scans at this facility use dose modulation, iterative reconstruction, and/or weight based dosing when appropriate to reduce radiation dose to as low as reasonably achievable. Results for orders placed during the hospital encounter of 10/15/20    CT CHEST WO CONTRAST    Narrative  EXAMINATION: CT CHEST WITHOUT CONTRAST    CLINICAL HISTORY:R91.1 Lung nodule ICD10, follow-up groundglass opacity right apex. COMPARISONS: None available. TECHNIQUE: TECHNIQUE: Contiguous axial images were obtained through the chest without contrast. Coronal and sagittal reformations were made. FINDINGS:  Lungs: There is a stable groundglass opacity in the right apex measuring approximately 13 mm. No significant interval change since 8/18. Recommend continued surveillance with CT in one to 2 years.     There is a 3.5 mm nodule in the lingula, adjacent to the major fissure, series 3, image 133. This also is unchanged. No other lung nodule or acute infiltrate. Ascending thoracic aorta is at the upper limits of normal in size measuring 3.9 cm in transverse diameter, unchanged. There are atherosclerotic calcifications in the aorta and coronary arteries. No significant mediastinal or hilar or axillary  lymphadenopathy. No pericardial effusion. No pleural effusion. No cardiac enlargement. No pneumothorax. 8 mm coarse calcification with peripheral calcification within the right lobe liver near the dome of the liver. This is unchanged dating back to 8/18. Bones are intact. Peripherally calcified bilateral breast implants. Impression  STABLE 1.3 CM GROUNDGLASS OPACITY RIGHT APEX. STABLE 3.5 MM NODULE LINGULA. NO ACUTE PROCESS. RECOMMEND FOLLOW UP CT SCAN IN 1 TO 2 YEARS. All CT scans at this facility use dose modulation, iterative reconstruction, and/or weight based dosing when appropriate to reduce radiation dose to as low as reasonably achievable. Assessment/Plan:     1. Nodule of right lung  Ct chest done and came for f/u. CT chest shows no significant change in nonspecific area of groundglass lesion in the right upper lobe. Suspicious remain high for malignancy continue follow-up with strongly recommended. She is aware about the results and I request PET scan and follow-up CT chest    - CT CHEST WO CONTRAST; Future  - PET CT Skull Base To Mid Thigh; Future    2. Chronic obstructive pulmonary disease, unspecified COPD type ([address]  She is using Brovana nebulizer twice a day , not using all the time. and nebulizer with albuterol and ipratropium 4 times a day when necessary and proair HFA 2 puffs when necessary. C/o shortness of breath with exertion. Occasional Wheezing. No Cough.   Continue bronchodilator therapy as before        Need to change appointment if PET is positive    Return in about 6 months (around 10/28/2022) for Ct chest f/u.      Hari Dawson MD

## 2022-04-29 ENCOUNTER — TELEPHONE (OUTPATIENT)
Dept: FAMILY MEDICINE CLINIC | Age: 62
End: 2022-04-29

## 2022-04-29 NOTE — TELEPHONE ENCOUNTER
PT is requesting a breast ultrasound. PT also stated that she is putting the gastroenterology on hold, because of her nerves. PT will call back to check up on the ultrasound for her breasts.

## 2022-06-02 DIAGNOSIS — J44.9 CHRONIC OBSTRUCTIVE PULMONARY DISEASE, UNSPECIFIED COPD TYPE (HCC): Primary | ICD-10-CM

## 2022-06-02 RX ORDER — ALBUTEROL SULFATE 2.5 MG/3ML
SOLUTION RESPIRATORY (INHALATION)
Qty: 300 ML | Refills: 0 | Status: SHIPPED | OUTPATIENT
Start: 2022-06-02 | End: 2022-08-07

## 2022-06-02 NOTE — TELEPHONE ENCOUNTER
Rx requested:  Requested Prescriptions     Pending Prescriptions Disp Refills    albuterol (PROVENTIL) (2.5 MG/3ML) 0.083% nebulizer solution [Pharmacy Med Name: Albuterol Sulfate (2.5 MG/3ML) 0.083% Inhalation Nebulization Solution] 300 mL 0     Sig: USE 1 VIAL IN NEBULIZER EVERY 4 HOURS AS NEEDED FOR WHEEZING    ipratropium (ATROVENT) 0.02 % nebulizer solution [Pharmacy Med Name: Ipratropium Bromide 0.02 % Inhalation Solution] 300 mL 0     Sig: INHALE 1 VIAL VIA NEBUILZER 4 TIMES A DAY       Last Office Visit:   4/28/2022      Next Visit Date:  Future Appointments   Date Time Provider Mishel Huang   6/17/2022  8:30 AM [facility] ULTRASOUND ROOM [address] [place] [address]   6/17/2022  9:00 AM [facility] ULTRASOUND ROOM [address] [place] [address]   6/17/2022  9:30 AM [facility] ULTRASOUND ROOM [address] [place] [address] Dolph Res   7/15/2022  8:15 AM Randell Clemens MD [facility]   10/7/2022  8:00 AM Claire Dolph Res

## 2022-06-16 ENCOUNTER — NURSE TRIAGE (OUTPATIENT)
Dept: OTHER | Facility: CLINIC | Age: 62
End: 2022-06-16

## 2022-06-16 ENCOUNTER — TELEPHONE (OUTPATIENT)
Dept: FAMILY MEDICINE CLINIC | Age: 62
End: 2022-06-16

## 2022-06-16 NOTE — TELEPHONE ENCOUNTER
----- Message from Michell Haynes sent at 6/16/2022  9:16 AM EDT -----  Subject: Referral Request    QUESTIONS   Reason for referral request? [place] would like her uric acid levels   tested and she would like to be tested for gout   Has the physician seen you for this condition before? No   Preferred Specialist (if applicable)? Do you already have an appointment scheduled? No  Additional Information for Provider?   ---------------------------------------------------------------------------  --------------  CALL BACK INFO  What is the best way for the office to contact you? OK to leave message on   voicemail  Preferred Call Back Phone Number? [phone]  ---------------------------------------------------------------------------  --------------  SCRIPT ANSWERS  Relationship to Patient?  Self

## 2022-06-16 NOTE — TELEPHONE ENCOUNTER
----- Message from Otoniel Hackett sent at 6/16/2022  9:17 AM EDT -----  Subject: Message to Provider    QUESTIONS  Information for Provider? Nisha Victoria stated she has a nodule located on   her throat. She is wondering if she should reschedule her test corroded   artery for tomorrow? Please give her a call to advise.  ---------------------------------------------------------------------------  --------------  CALL BACK INFO  What is the best way for the office to contact you? OK to leave message on   voicemail  Preferred Call Back Phone Number? [phone]  ---------------------------------------------------------------------------  --------------  SCRIPT ANSWERS  Relationship to Patient?  Self

## 2022-06-16 NOTE — TELEPHONE ENCOUNTER
Received call from Josue Michael at [facility] with [employer]. Subjective: Caller states \"I have a lump on outside of the throat that is causing constriction on the inside\"     Current Symptoms: Lump on the outside of the throat  Lymph node swelling  Mosquito bites that has turned in a lump  Severe itching  Inside throat, it \"felt like throat was sticking together\"    Onset: 3-4 days ago    Pain Severity: 0/10    Temperature: Has sweats which she states is normal     What has been tried: Does not use OTC's because of the chemicals    History related to the current reason for call: Denies and I see not     Recommended disposition: See in Office Today    Care advice provided, patient verbalizes understanding; denies any other questions or concerns; instructed to call back for any new or worsening symptoms. Patient/Caller agrees with recommended disposition; writer provided warm transfer to [employer] at [facility] for appointment scheduling     Attention Provider: Thank you for allowing me to participate in the care of your patient. The patient was connected to triage in response to information provided to the E[facility]. Please do not respond through this encounter as the response is not directed to a shared pool.       Reason for Disposition   Red or very tender (to touch) area and started over 24 hours after the bite    Protocols used: MOSQUITO BITE-ADULT-[place]

## 2022-06-16 NOTE — TELEPHONE ENCOUNTER
I know it's already ordered. She wants to know if she should cancel it because of a nodule on her throat?

## 2022-06-17 ENCOUNTER — HOSPITAL ENCOUNTER (OUTPATIENT)
Dept: ULTRASOUND IMAGING | Age: 62
Discharge: HOME | End: 2022-06-19
Payer: MEDICARE

## 2022-06-17 DIAGNOSIS — I65.23 BILATERAL CAROTID ARTERY STENOSIS: ICD-10-CM

## 2022-06-17 DIAGNOSIS — M10.9 ACUTE GOUT, UNSPECIFIED CAUSE, UNSPECIFIED SITE: ICD-10-CM

## 2022-06-17 DIAGNOSIS — M10.10 LEAD-INDUCED GOUT, UNSPECIFIED CHRONICITY, UNSPECIFIED SITE, INITIAL ENCOUNTER: ICD-10-CM

## 2022-06-17 DIAGNOSIS — T56.0X1A LEAD-INDUCED GOUT, UNSPECIFIED CHRONICITY, UNSPECIFIED SITE, INITIAL ENCOUNTER: ICD-10-CM

## 2022-06-17 DIAGNOSIS — E78.00 PURE HYPERCHOLESTEROLEMIA: ICD-10-CM

## 2022-06-17 DIAGNOSIS — Z12.31 ENCOUNTER FOR SCREENING MAMMOGRAM FOR MALIGNANT NEOPLASM OF BREAST: ICD-10-CM

## 2022-06-17 DIAGNOSIS — M10.9 ACUTE GOUT, UNSPECIFIED CAUSE, UNSPECIFIED SITE: Primary | ICD-10-CM

## 2022-06-17 LAB — URIC ACID, SERUM: 6 MG/DL (ref 2.4–5.7)

## 2022-06-17 PROCEDURE — 76641 ULTRASOUND BREAST COMPLETE: CPT

## 2022-07-15 ENCOUNTER — TELEPHONE (OUTPATIENT)
Dept: FAMILY MEDICINE CLINIC | Age: 62
End: 2022-07-15

## 2022-07-15 NOTE — TELEPHONE ENCOUNTER
Pt request uric acid and lipid labs. Pt request the lipid labs to be for NMR Lipo Profile, Lipoprint VAP LPP, and the hs-CRP C Reactive Protein, documents scanned to pt's chart for pcp to view. Pt will call later in week to see if labs have been ordered.

## 2022-07-19 DIAGNOSIS — M10.9 ACUTE GOUT, UNSPECIFIED CAUSE, UNSPECIFIED SITE: ICD-10-CM

## 2022-07-19 DIAGNOSIS — E78.00 PURE HYPERCHOLESTEROLEMIA: Primary | ICD-10-CM

## 2022-07-25 ENCOUNTER — HOSPITAL ENCOUNTER (OUTPATIENT)
Dept: ULTRASOUND IMAGING | Age: 62
Discharge: HOME | End: 2022-07-27
Payer: MEDICARE

## 2022-07-25 PROCEDURE — 93880 EXTRACRANIAL BILAT STUDY: CPT

## 2022-07-29 ENCOUNTER — TELEPHONE (OUTPATIENT)
Dept: FAMILY MEDICINE CLINIC | Age: 62
End: 2022-07-29

## 2022-07-31 NOTE — TELEPHONE ENCOUNTER
"              After Visit Summary   4/9/2018    Steffany Altamirano    MRN: [id number]           Patient Information     Date Of Birth          1956        Visit Information        Provider Department      4/9/2018 12:30 PM Cece Dozier MD [facility]        Today's Diagnoses     Rash    -  1    Chronic dermatitis of hands        Mixed hyperlipidemia        Hypothyroidism, unspecified type        Benign essential hypertension        Migraine without aura and without status migrainosus, not intractable           Follow-ups after your visit        Who to contact     If you have questions or need follow up information about today's clinic visit or your schedule please contact [facility] directly at [phone].  Normal or non-critical lab and imaging results will be communicated to you by Imsys[employer], letter or phone within 4 business days after the clinic has received the results. If you do not hear from us within 7 days, please contact the clinic through Imsyshart or phone. If you have a critical or abnormal lab result, we will notify you by phone as soon as possible.  Submit refill requests through [facility] or call your pharmacy and they will forward the refill request to us. Please allow 3 business days for your refill to be completed.          Additional Information About Your Visit        MyChart Information     [facility] lets you send messages to your doctor, view your test results, renew your prescriptions, schedule appointments and more. To sign up, go to [email or web address] . Click on \"Log in\" on the left side of the screen, which will take you to the Welcome page. Then click on \"Sign up Now\" on the right side of the page.     You will be asked to enter the access code listed below, as well as some personal information. Please follow the directions to create your username and password.     Your access code is: [email or web address]  Expires: 5/27/2018  5:05 " Saw that patient does have referral. Zac Beat to call her to notify her of this. Phone kept ringing with No VM set up. PM     Your access code will  in 90 days. If you need help or a new code, please call your [place] clinic or [phone].        Care EveryWhere ID     This is your Care EveryWhere ID. This could be used by other organizations to access your [place] medical records  [id number]        Your Vitals Were     Pulse BMI (Body Mass Index)                64 35.39 kg/m2           Blood Pressure from Last 3 Encounters:   18 122/74   18 128/80   17 118/80    Weight from Last 3 Encounters:   18 190 lb 6.4 oz (86.4 kg)   18 186 lb 5 oz (84.5 kg)   17 195 lb (88.5 kg)              Today, you had the following     No orders found for display         Today's Medication Changes          These changes are accurate as of 18 11:59 PM.  If you have any questions, ask your nurse or doctor.               Start taking these medicines.        Dose/Directions    clotrimazole-betamethasone cream   Commonly known as:  LOTRISONE   Used for:  Rash   Started by:  Cece Dozier MD        Apply topically 2 times daily   Quantity:  15 g   Refills:  1         These medicines have changed or have updated prescriptions.        Dose/Directions    lisinopril-hydrochlorothiazide 20-25 MG per tablet   Commonly known as:  PRINZIDE/ZESTORETIC   This may have changed:  Another medication with the same name was removed. Continue taking this medication, and follow the directions you see here.   Used for:  Benign essential hypertension   Changed by:  Cece Dozier MD        Dose:  1 tablet   Take 1 tablet by mouth daily   Quantity:  90 tablet   Refills:  3         Stop taking these medicines if you haven't already. Please contact your care team if you have questions.     amitriptyline 10 MG tablet   Commonly known as:  ELAVIL   Stopped by:  Cece Dozier MD                Where to get your medicines      These medications were sent to [id number] -  [place], [place] - [address] .  [address] [facility] [address]     Phone:  [phone]     clotrimazole-betamethasone cream    levothyroxine 50 MCG tablet    lisinopril-hydrochlorothiazide 20-25 MG per tablet    potassium chloride SA 20 MEQ CR tablet                Primary Care Provider Office Phone # Fax #    Cece De La Cruz [phone]       [address] [facility] [address]        Equal Access to Services     [facility] : Hadii aad ku hadasho Soomaali, waaxda luqadaha, qaybta kaalmada adeegyamarito, chirag weathers . So [facility] [phone].    ATENCIÓN: Si habla español, tiene a clemens disposición servicios gratuitos de asistencia lingüística. [facility] [phone].    We comply with applicable federal civil rights laws and [place] laws. We do not discriminate on the basis of race, color, national origin, age, disability, sex, sexual orientation, or gender identity.            Thank you!     Thank you for choosing [facility] AND [facility]  for your care. Our goal is always to provide you with excellent care. Hearing back from our patients is one way we can continue to improve our services. Please take a few minutes to complete the written survey that you may receive in the mail after your visit with us. Thank you!             Your Updated Medication List - Protect others around you: Learn how to safely use, store and throw away your medicines at www.disposemymeds.org.          This list is accurate as of 4/9/18 11:59 PM.  Always use your most recent med list.                   Brand Name Dispense Instructions for use Diagnosis    clotrimazole-betamethasone cream    LOTRISONE    15 g    Apply topically 2 times daily    Rash       levothyroxine 50 MCG tablet    SYNTHROID/LEVOTHROID    90 tablet    Take 1 tablet (50 mcg) by mouth every morning (before breakfast)    Hypothyroidism, unspecified type        lisinopril-hydrochlorothiazide 20-25 MG per tablet    PRINZIDE/ZESTORETIC    90 tablet    Take 1 tablet by mouth daily    Benign essential hypertension       magnesium oxide 400 MG tablet    MAG-OX     Take 1 tablet by mouth daily        potassium chloride SA 20 MEQ CR tablet    K-DUR/KLOR-CON M    90 tablet    Take 1 tablet (20 mEq) by mouth daily with food    Benign essential hypertension       rizatriptan 5 MG ODT tab    MAXALT-MLT     Place 1 tablet under the tongue 2 times daily as needed for migraine Give at minimum 2 hrs apart. Max Dose: 30 mg per 24 hrs

## 2022-08-01 NOTE — TELEPHONE ENCOUNTER
Patient called office back and states that her [employer] insurance is telling her they do not have any referral on file dated for 6/15/2022 for a prior authorization and this is a causing a price discrepancy. Please advise.     [phone]

## 2022-08-02 ENCOUNTER — OFFICE VISIT (OUTPATIENT)
Dept: FAMILY MEDICINE CLINIC | Age: 62
End: 2022-08-02
Payer: MEDICARE

## 2022-08-02 VITALS
HEIGHT: 62 IN | RESPIRATION RATE: 14 BRPM | WEIGHT: 128.6 LBS | BODY MASS INDEX: 23.66 KG/M2 | OXYGEN SATURATION: 94 % | TEMPERATURE: 97.9 F | HEART RATE: 64 BPM | DIASTOLIC BLOOD PRESSURE: 82 MMHG | SYSTOLIC BLOOD PRESSURE: 120 MMHG

## 2022-08-02 DIAGNOSIS — I65.23 BILATERAL CAROTID ARTERY STENOSIS: ICD-10-CM

## 2022-08-02 DIAGNOSIS — H53.9 VISION CHANGES: ICD-10-CM

## 2022-08-02 DIAGNOSIS — M20.41 HAMMER TOE OF RIGHT FOOT: ICD-10-CM

## 2022-08-02 DIAGNOSIS — R07.0 THROAT PAIN: ICD-10-CM

## 2022-08-02 DIAGNOSIS — E78.00 PURE HYPERCHOLESTEROLEMIA: Primary | ICD-10-CM

## 2022-08-02 PROCEDURE — 1036F TOBACCO NON-USER: CPT | Performed by: FAMILY MEDICINE

## 2022-08-02 PROCEDURE — 3017F COLORECTAL CA SCREEN DOC REV: CPT | Performed by: FAMILY MEDICINE

## 2022-08-02 PROCEDURE — 99214 OFFICE O/P EST MOD 30 MIN: CPT | Performed by: FAMILY MEDICINE

## 2022-08-02 PROCEDURE — G8420 CALC BMI NORM PARAMETERS: HCPCS | Performed by: FAMILY MEDICINE

## 2022-08-02 PROCEDURE — G8427 DOCREV CUR MEDS BY ELIG CLIN: HCPCS | Performed by: FAMILY MEDICINE

## 2022-08-02 ASSESSMENT — ENCOUNTER SYMPTOMS: VOMITING: 0

## 2022-08-02 NOTE — TELEPHONE ENCOUNTER
Patient came in for her appointment at 8:45 and asked about the status of her referral being faxed over to [facility]. I checked her chart to see that multiple messages have been documented regarding this referral.  Patient wants to make sure the referral is faxed over to [facility].

## 2022-08-02 NOTE — PROGRESS NOTES
Subjective:      Patient ID: Jose Meng is a 59 y.o. female    Hyperlipidemia  This is a chronic problem. The current episode started more than 1 year ago. The problem is uncontrolled. Pertinent negatives include no chest pain. Other  This is a chronic problem. The current episode started more than 1 year ago. Pertinent negatives include no chest pain, chills, fever, vomiting or weakness. Here in follow up for lipids and carotid disease and blood work. Did not get lipid panel done-wants particle break down. No fever or chills. Has had intermittent pain of right of foot over the last few months. Has felt some throat pain on occasion over the last few months. No swallowing problems. No emesis. Review of Systems   Constitutional:  Negative for chills and fever. Cardiovascular:  Negative for chest pain. Gastrointestinal:  Negative for vomiting. Neurological:  Negative for weakness. Reviewed allergy, medical, social, surgical, family and med list changes and updated   Files--reviewed blood work with minimal elevated uric acid.   And carotid u/s with bilateral mild narrowing      Social History     Socioeconomic History    Marital status: Single   Tobacco Use    Smoking status: Former     Packs/day: 0.50     Years: 30.00     Pack years: 15.00     Types: Cigarettes     Quit date: 2015     Years since quittin.6    Smokeless tobacco: Never   Substance and Sexual Activity    Alcohol use: No    Drug use: No     Social Determinants of Health     Financial Resource Strain: Low Risk     Difficulty of Paying Living Expenses: Not hard at all   Food Insecurity: No Food Insecurity    Worried About Running Out of Food in the Last Year: Never true    Ran Out of Food in the Last Year: Never true     Current Outpatient Medications   Medication Sig Dispense Refill    Coenzyme Q10 (CO Q 10 PO) Take by mouth      albuterol (PROVENTIL) (2.5 MG/3ML) 0.083% nebulizer solution USE 1 VIAL IN NEBULIZER EVERY 4 HOURS AS NEEDED FOR WHEEZING 300 mL 0    ipratropium (ATROVENT) 0.02 % nebulizer solution INHALE 1 VIAL VIA NEBUILZER 4 TIMES A  mL 0    Respiratory Therapy Supplies (NEBULIZER AIR TUBE/PLUGS) MISC Nebulizer supply, cup and tubing   2 reusable nebulizer cup 1 each 5    Arformoterol Tartrate (BROVANA) 15 MCG/2ML NEBU Take 2 mLs by nebulization 2 times daily use 1 vial in nebulizer twice a day DX COPD J44.9 360 mL 3    hydrocortisone 2.5 % cream Apply topically 2 times daily x 2 weeks. 3.5 g 0    ketoconazole (NIZORAL) 2 % cream Apply topically daily x 2 weeks. 30 g 1    B Complex Vitamins (VITAMIN-B COMPLEX PO) Take 1 capsule by mouth daily      Handicap Placard MISC by Does not apply route 5 yrs 1 each 0    Lifitegrast (XIIDRA) 5 % SOLN Apply to eye      albuterol sulfate HFA (PROVENTIL HFA) 108 (90 Base) MCG/ACT inhaler Inhale 2 puffs into the lungs every 6 hours as needed for Wheezing 1 Inhaler 3    Nebulizers (AIRIAL COMPACT MINI NEBULIZER) MISC PLEASE PROVIDE PATIENT WITH INNOSPIRE PORTABLE NEBULIZER UNIT. HEALTH CARE SOLUTIONS 1 each 0    Carboxymethylcell-Hypromellose (GENTEAL OP) Apply to eye      POTASSIUM PO Take by mouth      vitamin E 1000 units capsule Take 1,000 Units by mouth in the morning.      magnesium 30 MG tablet Take 30 mg by mouth daily      vitamin B-12 (CYANOCOBALAMIN) 100 MCG tablet Take 50 mcg by mouth daily       No current facility-administered medications for this visit.      Family History   Problem Relation Age of Onset    Heart Disease Father     High Blood Pressure Father      Past Medical History:   Diagnosis Date    Acute respiratory failure ([facility] [address]     Arthritis     general joints    Cataracts, bilateral 05/03/2017    COPD (chronic obstructive pulmonary disease) (HCC)     Fracture of coccyx (HCC)     History of non-ST elevation myocardial infarction (NSTEMI) 3/17/2016    History of tobacco abuse     past smoker / quit > 2 yrs    HTN (hypertension)     past trx    Lung disease Tendinitis of wrist     BIALTERAL     Objective:   /82   Pulse 64   Temp 97.9 °F (36.6 °C)   Resp 14   Ht 5' 2\" (1.575 m)   Wt 128 lb 9.6 oz (58.3 kg)   SpO2 94%   BMI 23.52 kg/m²     Physical Exam  Heent oral cavity normal.  No tongue lesions   Neck:no carotid bruits. No masses. No adenopathy. No thyroid asymmetry. Lungs:few rhonchi bilaterally . No wheezes or rales. Heart:rate reg. No murmur. No gallops   Pulses:Radials 2+ equal               D. 1+ equal  Extremities:no edema in either leg. Hammer toe deformities of 2nd toes of both feet. No palpable masses. No erythema. Gen:   In no acute distress  Abdomen; B.S present. Soft  Non tender. No hepatosplenomegaly. No masses    Assessment:          Diagnosis Orders   1. Pure hypercholesterolemia        2. Bilateral carotid artery stenosis        3. Hammer toe of right foot  External Referral to Podiatry      4. Throat pain  External Referral to ENT      5.  Vision changes  External Referral to Ophthalmology             Plan:      Orders Placed This Encounter   Procedures    External Referral to ENT     Referral Priority:   Routine     Referral Type:   Eval and Treat     Referral Reason:   Specialty Services Required     Requested Specialty:   Otolaryngology     Number of Visits Requested:   1    External Referral to Podiatry     Referral Priority:   Routine     Referral Type:   Eval and Treat     Referral Reason:   Specialty Services Required     Requested Specialty:   Podiatry     Number of Visits Requested:   1    External Referral to Ophthalmology     Referral Priority:   Routine     Referral Type:   Eval and Treat     Referral Reason:   Specialty Services Required     Requested Specialty:   Ophthalmology     Number of Visits Requested:   1    Continue current meds for now   Will check with lab concerning particle break down   F/u dependent on above

## 2022-08-03 DIAGNOSIS — J44.9 CHRONIC OBSTRUCTIVE PULMONARY DISEASE, UNSPECIFIED COPD TYPE (HCC): ICD-10-CM

## 2022-08-07 RX ORDER — ALBUTEROL SULFATE 2.5 MG/3ML
SOLUTION RESPIRATORY (INHALATION)
Qty: 300 ML | Refills: 0 | Status: SHIPPED | OUTPATIENT
Start: 2022-08-07 | End: 2022-08-09 | Stop reason: SDUPTHER

## 2022-08-09 DIAGNOSIS — J44.9 CHRONIC OBSTRUCTIVE PULMONARY DISEASE, UNSPECIFIED COPD TYPE (HCC): ICD-10-CM

## 2022-08-09 RX ORDER — ALBUTEROL SULFATE 2.5 MG/3ML
SOLUTION RESPIRATORY (INHALATION)
Qty: 300 ML | Refills: 1 | Status: SHIPPED | OUTPATIENT
Start: 2022-08-09 | End: 2022-10-18 | Stop reason: SDUPTHER

## 2022-08-09 NOTE — TELEPHONE ENCOUNTER
Please approve or deny this request.    Rx requested:  Requested Prescriptions     Pending Prescriptions Disp Refills    albuterol (PROVENTIL) (2.5 MG/3ML) 0.083% nebulizer solution 300 mL 1     Sig: USE 1 VIAL IN NEBULIZER EVERY 4 HOURS AS NEEDED FOR WHEEZING         Last Office Visit:   4/28/2022      Next Visit Date:  Future Appointments   Date Time Provider Mishel Huang   10/7/2022  8:00 AM [facility] ROOM 1 [facility] [place]   10/18/2022  9:15 AM Pratima Randle MD [address] [place] [address]

## 2022-08-10 DIAGNOSIS — J44.9 CHRONIC OBSTRUCTIVE PULMONARY DISEASE, UNSPECIFIED COPD TYPE (HCC): ICD-10-CM

## 2022-08-15 ENCOUNTER — COMMUNITY OUTREACH (OUTPATIENT)
Dept: INTERNAL MEDICINE CLINIC | Facility: CLINIC | Age: 62
End: 2022-08-15

## 2022-08-15 NOTE — PROGRESS NOTES
Patient's HM shows they are current for Colorectal Screening. [employer] and  files searched with success. Results attached to order and HM updated.      Patient is overdue for Mammogram Screening

## 2022-09-09 ENCOUNTER — TELEPHONE (OUTPATIENT)
Dept: FAMILY MEDICINE CLINIC | Age: 62
End: 2022-09-09

## 2022-09-09 DIAGNOSIS — Z12.31 ENCOUNTER FOR SCREENING MAMMOGRAM FOR MALIGNANT NEOPLASM OF BREAST: Primary | ICD-10-CM

## 2022-10-07 ENCOUNTER — HOSPITAL ENCOUNTER (OUTPATIENT)
Dept: CT IMAGING | Age: 62
Discharge: HOME | End: 2022-10-09
Payer: MEDICARE

## 2022-10-07 DIAGNOSIS — R91.1 NODULE OF RIGHT LUNG: ICD-10-CM

## 2022-10-07 PROCEDURE — 71250 CT THORAX DX C-: CPT

## 2022-10-18 ENCOUNTER — OFFICE VISIT (OUTPATIENT)
Dept: PULMONOLOGY | Age: 62
End: 2022-10-18
Payer: MEDICARE

## 2022-10-18 ENCOUNTER — TELEPHONE (OUTPATIENT)
Dept: PULMONOLOGY | Age: 62
End: 2022-10-18

## 2022-10-18 VITALS
SYSTOLIC BLOOD PRESSURE: 136 MMHG | OXYGEN SATURATION: 98 % | WEIGHT: 135 LBS | BODY MASS INDEX: 24.69 KG/M2 | HEART RATE: 79 BPM | DIASTOLIC BLOOD PRESSURE: 70 MMHG

## 2022-10-18 DIAGNOSIS — J44.9 CHRONIC OBSTRUCTIVE PULMONARY DISEASE, UNSPECIFIED COPD TYPE (HCC): ICD-10-CM

## 2022-10-18 DIAGNOSIS — R91.1 NODULE OF RIGHT LUNG: Primary | ICD-10-CM

## 2022-10-18 PROCEDURE — 3023F SPIROM DOC REV: CPT | Performed by: INTERNAL MEDICINE

## 2022-10-18 PROCEDURE — G8484 FLU IMMUNIZE NO ADMIN: HCPCS | Performed by: INTERNAL MEDICINE

## 2022-10-18 PROCEDURE — G8420 CALC BMI NORM PARAMETERS: HCPCS | Performed by: INTERNAL MEDICINE

## 2022-10-18 PROCEDURE — 1036F TOBACCO NON-USER: CPT | Performed by: INTERNAL MEDICINE

## 2022-10-18 PROCEDURE — G8427 DOCREV CUR MEDS BY ELIG CLIN: HCPCS | Performed by: INTERNAL MEDICINE

## 2022-10-18 PROCEDURE — 99214 OFFICE O/P EST MOD 30 MIN: CPT | Performed by: INTERNAL MEDICINE

## 2022-10-18 PROCEDURE — 3017F COLORECTAL CA SCREEN DOC REV: CPT | Performed by: INTERNAL MEDICINE

## 2022-10-18 RX ORDER — ALBUTEROL SULFATE 2.5 MG/3ML
SOLUTION RESPIRATORY (INHALATION)
Qty: 300 ML | Refills: 3 | Status: SHIPPED | OUTPATIENT
Start: 2022-10-18

## 2022-10-18 RX ORDER — ARFORMOTEROL TARTRATE 15 UG/2ML
15 SOLUTION RESPIRATORY (INHALATION) 2 TIMES DAILY
Qty: 360 ML | Refills: 1 | Status: SHIPPED | OUTPATIENT
Start: 2022-10-18

## 2022-10-18 RX ORDER — ARFORMOTEROL TARTRATE 15 UG/2ML
15 SOLUTION RESPIRATORY (INHALATION) 2 TIMES DAILY
Qty: 360 ML | Refills: 1 | Status: SHIPPED | OUTPATIENT
Start: 2022-10-18 | End: 2022-10-18

## 2022-10-18 ASSESSMENT — ENCOUNTER SYMPTOMS
SHORTNESS OF BREATH: 1
CHEST TIGHTNESS: 0
VOMITING: 0
ABDOMINAL PAIN: 0
EYE ITCHING: 0
VOICE CHANGE: 0
SORE THROAT: 0
EYE DISCHARGE: 0
DIARRHEA: 0
COUGH: 0
WHEEZING: 0
TROUBLE SWALLOWING: 0
RHINORRHEA: 0
SINUS PRESSURE: 0
NAUSEA: 0

## 2022-10-18 NOTE — PROGRESS NOTES
Subjective:     Cecilia Clark is a 57 y.o. female who complains today of:     Chief Complaint   Patient presents with    Follow-up     6m f/u - Nodule of right lung, CT scan       HPI  Ct chest done on 10/7/22 and came for f/u   There is no evidence of acute consolidation or infiltrate. No  active pleural disease is seen. There has been mild interval enlargement of  the previously noted ground-glass opacity in the right upper lobe. It  measures 2.6 x 2.0 cm. It previously measured 1.9 x 1.4 cm. PET/CT is  recommended for further evaluation. No other pulmonary parenchymal nodule or  mass is noted. Refused PET scan and refused Biopsy even if Ct chest show Rt. Upper lung increased in size  of lesion in rt. Upper , she is only agreeable for CT chest in 6 month. She is using Brovana nebulizer twice a day , not using all the time. and Nebulizer with albuterol and ipratropium 4 times a day when necessary and proair HFA 2 puffs when necessary. C/o shortness of breath with exertion. No  Wheezing. No Cough. No Hemoptysis. No Chest tightness. No Chest pain with radiation or pleuritic pain. No  leg edema. No orthopnea. No Fever or chills. No Rhinorrhea and postnasal drip.     Allergies:  Pcn [penicillins], Corticosteroids, Lidocaine, Contrast [iodides], Glycerin, Naprosyn [naproxen], and Valium [diazepam]  Past Medical History:   Diagnosis Date    Acute respiratory failure ([address]     Arthritis     general joints    Cataracts, bilateral 05/03/2017    COPD (chronic obstructive pulmonary disease) ([address]     Fracture of coccyx ([address]     History of non-ST elevation myocardial infarction (NSTEMI) 3/17/2016    History of tobacco abuse     past smoker / quit > 2 yrs    HTN (hypertension)     past trx    Lung disease     Tendinitis of wrist     BIALTERAL     Past Surgical History:   Procedure Laterality Date    BREAST SURGERY      bilateral implants    CARDIAC CATHETERIZATION  3/2016    CATARACT EXTRACTION W/  INTRAOCULAR LENS IMPLANT Right 11/2/15     DR. SANTIAGO    CATARACT REMOVAL Left 10/12/15     DR. Yeh [id number]    CHOLECYSTECTOMY, LAPAROSCOPIC N/A 2017    CHOLECYSTECTOMY LAPAROSCOPIC - CHOLANGIOGRAM POSS OPEN performed by Yossi Bell MD at [address] [facility]      EYE SURGERY      [place] with IOL OU    TUBAL LIGATION       Family History   Problem Relation Age of Onset    Heart Disease Father     High Blood Pressure Father      Social History     Socioeconomic History    Marital status: Single     Spouse name: Not on file    Number of children: Not on file    Years of education: Not on file    Highest education level: Not on file   Occupational History    Not on file   Tobacco Use    Smoking status: Former     Packs/day: 0.50     Years: 30.00     Pack years: 15.00     Types: Cigarettes     Quit date: 2015     Years since quittin.8    Smokeless tobacco: Never   Substance and Sexual Activity    Alcohol use: No    Drug use: No    Sexual activity: Not on file   Other Topics Concern    Not on file   Social History Narrative    Not on file     Social Determinants of Health     Financial Resource Strain: Low Risk     Difficulty of Paying Living Expenses: Not hard at all   Food Insecurity: No Food Insecurity    Worried About Running Out of Food in the Last Year: Never true    Ran Out of Food in the Last Year: Never true   Transportation Needs: Not on file   Physical Activity: Not on file   Stress: Not on file   Social Connections: Not on file   Intimate Partner Violence: Not on file   Housing Stability: Not on file         Review of Systems   Constitutional:  Negative for chills, diaphoresis, fatigue and fever. HENT:  Negative for congestion, mouth sores, nosebleeds, postnasal drip, rhinorrhea, sinus pressure, sneezing, sore throat, trouble swallowing and voice change. Eyes:  Negative for discharge, itching and visual disturbance. Respiratory:  Positive for shortness of breath.  Negative for cough, chest tightness and wheezing. Cardiovascular:  Negative for chest pain, palpitations and leg swelling. Gastrointestinal:  Negative for abdominal pain, diarrhea, nausea and vomiting. Genitourinary:  Negative for difficulty urinating and hematuria. Musculoskeletal:  Negative for arthralgias, joint swelling and myalgias. Skin:  Negative for rash. Allergic/Immunologic: Negative for environmental allergies and food allergies. Neurological:  Negative for dizziness, tremors, weakness and headaches. Psychiatric/Behavioral:  Negative for behavioral problems and sleep disturbance.        :     Vitals:    10/18/22 1000   BP: 136/70   Site: Left Upper Arm   Position: Sitting   Cuff Size: Medium Adult   Pulse: 79   SpO2: 98%   Weight: 135 lb (61.2 kg)     Wt Readings from Last 3 Encounters:   10/18/22 135 lb (61.2 kg)   08/02/22 128 lb 9.6 oz (58.3 kg)   04/28/22 130 lb (59 kg)         Physical Exam  Constitutional:       General: She is not in acute distress. Appearance: She is well-developed. She is not diaphoretic. HENT:      Head: Normocephalic and atraumatic. Nose: Nose normal.   Eyes:      Pupils: Pupils are equal, round, and reactive to light. Neck:      Thyroid: No thyromegaly. Vascular: No JVD. Trachea: No tracheal deviation. Cardiovascular:      Rate and Rhythm: Normal rate and regular rhythm. Heart sounds: No murmur heard. No friction rub. No gallop. Pulmonary:      Effort: No respiratory distress. Breath sounds: No wheezing or rales. Comments: diminished Breath sound bilaterally. Chest:      Chest wall: No tenderness. Abdominal:      General: There is no distension. Tenderness: There is no abdominal tenderness. There is no rebound. Musculoskeletal:         General: Normal range of motion. Lymphadenopathy:      Cervical: No cervical adenopathy. Skin:     General: Skin is warm and dry.    Neurological:      Mental Status: She is alert and oriented to person, place, and time. Coordination: Coordination normal.   Psychiatric:         Mood and Affect: Mood normal.         Behavior: Behavior normal.       Current Outpatient Medications   Medication Sig Dispense Refill    ipratropium (ATROVENT) 0.02 % nebulizer solution Take 2.5 mLs by nebulization 4 times daily 300 mL 3    albuterol (PROVENTIL) (2.5 MG/3ML) 0.083% nebulizer solution USE 1 VIAL IN NEBULIZER EVERY 4 HOURS AS NEEDED FOR WHEEZING 300 mL 3    Arformoterol Tartrate (BROVANA) 15 MCG/2ML NEBU Take 2 mLs by nebulization in the morning and 2 mLs in the evening. use 1 vial in nebulizer twice a day DX COPD J44.9. 360 mL 1    Coenzyme Q10 (CO Q 10 PO) Take by mouth      Respiratory Therapy Supplies (NEBULIZER AIR TUBE/PLUGS) MISC Nebulizer supply, cup and tubing   2 reusable nebulizer cup 1 each 5    hydrocortisone 2.5 % cream Apply topically 2 times daily x 2 weeks. 3.5 g 0    ketoconazole (NIZORAL) 2 % cream Apply topically daily x 2 weeks. 30 g 1    B Complex Vitamins (VITAMIN-B COMPLEX PO) Take 1 capsule by mouth daily      Handicap Placard MISC by Does not apply route 5 yrs 1 each 0    Lifitegrast (XIIDRA) 5 % SOLN Apply to eye      albuterol sulfate HFA (PROVENTIL HFA) 108 (90 Base) MCG/ACT inhaler Inhale 2 puffs into the lungs every 6 hours as needed for Wheezing 1 Inhaler 3    Nebulizers (AIRIAL COMPACT MINI NEBULIZER) MISC PLEASE PROVIDE PATIENT WITH INNOSPIRE PORTABLE NEBULIZER UNIT. HEALTH CARE SOLUTIONS 1 each 0    Carboxymethylcell-Hypromellose (GENTEAL OP) Apply to eye      POTASSIUM PO Take by mouth      vitamin E 1000 units capsule Take 1,000 Units by mouth in the morning.      magnesium 30 MG tablet Take 30 mg by mouth daily      vitamin B-12 (CYANOCOBALAMIN) 100 MCG tablet Take 50 mcg by mouth daily       No current facility-administered medications for this visit.        No results found for this or any previous visit.  ]  Results for orders placed during the hospital encounter of 05/03/17    XR Chest Portable    Narrative  PORTABLE CHEST: 5/3/2017    CLINICAL HISTORY: Shortness of breath. COMPARISON: 12/3/2015, and chest CT 1/17/2017. A portable upright AP radiograph of the chest was obtained. FINDINGS:    A shallow inspiration is present without significant infiltrate identified. The cardiac and mediastinal silhouettes appear within normal limits for the patient's age group and technique. There is no significant pleural effusion, vascular congestion, pneumothorax, or displaced fractures identified. Calcified prosthetic breast implants are again noted. Impression  POOR INSPIRATION WITHOUT EVIDENCE OF ACTIVE CARDIOPULMONARY DISEASE.  ]  No results found for this or any previous visit.  ]  Results for orders placed during the hospital encounter of 10/07/22    CT CHEST WO CONTRAST    Narrative  EXAMINATION:  CT OF THE CHEST WITHOUT CONTRAST 10/7/2022 8:03 am    TECHNIQUE:  CT of the chest was performed without the administration of intravenous  contrast. Multiplanar reformatted images are provided for review. Automated  exposure control, iterative reconstruction, and/or weight based adjustment of  the mA/kV was utilized to reduce the radiation dose to as low as reasonably  achievable. COMPARISON:  The previous study performed 04/07/2022. HISTORY:  ORDERING SYSTEM PROVIDED HISTORY: Nodule of right lung  TECHNOLOGIST PROVIDED HISTORY:  Reason for exam:->ground glass nodule rt. lung  What reading provider will be dictating this exam?->CRC    FINDINGS:  Mediastinum: No axillary, hilar, or mediastinal lymphadenopathy is  identified. The thoracic aorta is again atherosclerotic. Calcifications are  again noted involving the aortic valve. The remainder of the visualized  pulmonary vasculature is unremarkable for a non IV contrast study. The heart  is within normal limits in size. The thyroid gland, esophagus, and trachea  are unremarkable.     Lungs/pleura: There is no evidence of acute consolidation or infiltrate. No  active pleural disease is seen. There has been mild interval enlargement of  the previously noted ground-glass opacity in the right upper lobe. It  measures 2.6 x 2.0 cm. It previously measured 1.9 x 1.4 cm. PET/CT is  recommended for further evaluation. No other pulmonary parenchymal nodule or  mass is noted. Upper Abdomen: A calcified granuloma is again seen in the right lobe of the  liver. A right adrenal gland adenoma is again identified. Small accessory  spleens are again present. Soft Tissues/Bones: Again seen are densely and peripherally calcified  bilateral breast implants. There is again exaggeration of the normal  thoracic kyphosis. Osteo-degenerative changes are again noted involving the  thoracic spine. Impression  1. Mild interval enlargement of the previously noted ground-glass opacity in  the right upper lobe, when compared to the previous study performed  04/07/2022. PET/CT is recommended. 2.  No other significant interval change, as described above. Results for orders placed during the hospital encounter of 04/07/22    CT CHEST WO CONTRAST    Narrative  EXAMINATION: CT CHEST WO CONTRAST    DATE:4/7/2022 7:56 AM    CLINICAL HISTORY: ANTERIOR CHEST PAIN. SHORTNESS OF BREATH. R91.1 LUNG NODULE ICD10    COMPARISON:  OCTOBER 7, 2021, OCTOBER 15, 2020, AND OCTOBER 10, 2019. TECHNIQUE: Helical CT was performed through the chest without IV contrast.    , All CT scans at this facility use dose modulation, iterative reconstruction, and/or weight based dosing when appropriate to reduce radiation dose to as low as reasonably achievable.  Some of this report was completed using Power Scribe 211  [id number] and may include unintended errors with respect to translation of words, typographical errors or grammatical errors which may not have been identified prior to the finalization of this report. FINDINGS:    Lungs: Persistent area of groundglass opacity with a minimal solid component of less than 6 mm, (this is considered subsolid) this lesion now measures approximately 2.0 x 2.4 cm compared to 1.9 x 2.1 cm without any significant solid component. While  this lesion is relatively stable without statistically significant change when compared to the most recent CT from October 7, 2021 there has been definite increase in size when compared to the scans of October 10, 2019. While the absence of a definite  solid component is somewhat reassuring, close follow-up of this lesion is recommended. Growth of this type of lesion can be relatively slow and occur over several years. The primary differential for this lesion includes an inflammatory nodule or  granuloma which typically would improve on follow-up imaging, versus atypical adenomatous hyperplasia or early stage of adenocarcinoma. Consider CT follow-up in 6 months. 4 mm perifissural nodule again noted at the left base. Lungs are otherwise clear. Pleura:Normal. No effusion or thickening    Mediastinum :Mediastinum and marivel are unremarkable. Vessels:Thoracic aorta is intact. Bones:No acute osseous abnormalities. Other:None    Impression  NO SIGNIFICANT CHANGE IN NONSPECIFIC AREA ROUND GLASS LESION IN THE RIGHT UPPER LOBE. SUSPICION REMAINS HIGH FOR MALIGNANCY. CONTINUED FOLLOW-UP IS STRONGLY RECOMMENDED      Results for orders placed during the hospital encounter of 10/07/21    CT CHEST WO CONTRAST    Narrative  EXAMINATION: CT CHEST WITHOUT CONTRAST    CLINICAL HISTORY:R91.1 Lung nodule ICD10 , follow-up groundglass opacity right apex. COMPARISONS: None available. TECHNIQUE: TECHNIQUE: Contiguous axial images were obtained through the chest without contrast. Coronal and sagittal reformations were made. FINDINGS:  Lungs:  There is a ground glass opacity in the right apex which measures approximately 16 x 19 x 21 mm mm in diameter and previously measured 13 x 17 x 18 mm. This is slightly increased in size but less than 30 mm in diameter. There is a small solid  component measuring 6 mm in diameter, unchanged. This is probably benign. Recommend follow up exam in 6 months. There is a 3.5 mm nodule in the lingula, adjacent to the major fissure, series 3 image 136. No other suspicious lung nodule. The ascending thoracic aorta measures 3.8 cm in diameter, upper limits of normal, unchanged. There are atherosclerotic calcifications in the aortic arch and descending thoracic aorta. No significant mediastinal or hilar or axillary lymphadenopathy. No  pericardial effusion. No pleural effusion. No acute infiltrate. No pneumothorax. Bilateral breast implants which are peripherally calcified. Suspect at least partial bilateral breast implant rupture. Again seen is an 8 mm coarse calcification in the dome of the right lobe liver, unchanged. Bones are intact. Impression  MILDLY ENLARGING GROUND GLASS OPACITY RIGHT UPPER LOBE WITH A 6 MM SOLID COMPONENT. THIS IS PROBABLY BENIGN. RECOMMEND SIX-MONTH FOLLOW-UP EXAM.    NO ACUTE PROCESS. All CT scans at this facility use dose modulation, iterative reconstruction, and/or weight based dosing when appropriate to reduce radiation dose to as low as reasonably achievable.  ]  No results found for this or any previous visit.  ]    Assessment/Plan:     1. Nodule of right lung  CT chest done on 10/7/22 and came for f/u . There is no evidence of acute consolidation or infiltrate. No active pleural disease is seen. There has been mild interval enlargement of the previously noted ground-glass opacity in the right upper lobe. It measures 2.6 x 2.0 cm. It previously measured 1.9 x 1.4 cm. PET/CT is  recommended for further evaluation. No other pulmonary parenchymal nodule or  mass is noted.     Patient is aware that malignancy can not be r/o  and if malignancy can spread other other area of body and sometimes nothing can be done  she os aware about it but Refused to have PET scan or Biopsy to r/o malignancy  even if Ct chest show Rt. Upper lung lesion increased in size, she is only agreeable for CT chest in 6 month. - CT CHEST WO CONTRAST; Future    2. Chronic obstructive pulmonary disease, unspecified COPD type ([facility]  She is using Brovana nebulizer twice a day , not using all the time. and Nebulizer with albuterol and ipratropium 4 times a day when necessary and proair HFA 2 puffs when necessary. C/o shortness of breath with exertion. No  Wheezing. No Cough. No Hemoptysis. Continue same     - ipratropium (ATROVENT) 0.02 % nebulizer solution; Take 2.5 mLs by nebulization 4 times daily  Dispense: 300 mL; Refill: 3  - albuterol (PROVENTIL) (2.5 MG/3ML) 0.083% nebulizer solution; USE 1 VIAL IN NEBULIZER EVERY 4 HOURS AS NEEDED FOR WHEEZING  Dispense: 300 mL; Refill: 3  - Arformoterol Tartrate (BROVANA) 15 MCG/2ML NEBU; Take 2 mLs by nebulization in the morning and 2 mLs in the evening. use 1 vial in nebulizer twice a day DX COPD J44.9.   Dispense: 360 mL; Refill: 1    She does not want to make f/u appointment , she said she wall call office after CT chest done for f/u visit     Deonna Castro MD

## 2022-10-18 NOTE — TELEPHONE ENCOUNTER
PATIENT WANTS US TO HAVE THIS INFORMATION ON FILE:    Yamile Capone 30 - [phone]    FAX - [phone]

## 2022-12-13 ENCOUNTER — TELEPHONE (OUTPATIENT)
Dept: INTERNAL MEDICINE CLINIC | Age: 62
End: 2022-12-13

## 2022-12-13 DIAGNOSIS — N60.01 BREAST CYST, RIGHT: Primary | ICD-10-CM

## 2022-12-13 NOTE — TELEPHONE ENCOUNTER
For the right breast they called her back for a follow up so they just need an order for the right breast. Appt is 12/14 at 730am

## 2022-12-13 NOTE — TELEPHONE ENCOUNTER
Jennifer calling to see if provider can send in another order in for her jennifer that she is coming in for today she is a 6 months follow up from a July visit and they called back for a 6 month for the right breast

## 2022-12-14 ENCOUNTER — HOSPITAL ENCOUNTER (OUTPATIENT)
Dept: ULTRASOUND IMAGING | Age: 62
Discharge: HOME | End: 2022-12-16
Payer: MEDICARE

## 2022-12-14 DIAGNOSIS — N60.01 BREAST CYST, RIGHT: ICD-10-CM

## 2022-12-14 PROCEDURE — 76642 ULTRASOUND BREAST LIMITED: CPT

## 2023-01-27 ENCOUNTER — TELEPHONE (OUTPATIENT)
Dept: FAMILY MEDICINE CLINIC | Age: 63
End: 2023-01-27

## 2023-02-06 ENCOUNTER — TELEPHONE (OUTPATIENT)
Dept: FAMILY MEDICINE CLINIC | Age: 63
End: 2023-02-06

## 2023-02-06 DIAGNOSIS — H53.9 VISION CHANGES: Primary | ICD-10-CM

## 2023-02-06 NOTE — TELEPHONE ENCOUNTER
Patient is calling to request a referral to Dr. Masha Tavera. Patient is saying that she needs to have a referral in place at all times incase she needs to go at a moment's notice. Please advise, thank you.

## 2023-02-27 ENCOUNTER — TELEPHONE (OUTPATIENT)
Dept: FAMILY MEDICINE CLINIC | Age: 63
End: 2023-02-27

## 2023-02-27 NOTE — TELEPHONE ENCOUNTER
Patient calling into the office to get 3 new referrals. Patient has not seen provider since August of last year. She needs the first one for PT for her leg swelling at the [address] [place] [address]. The 2nd one is to a Dr. Sharonda Galvin a dermatologist at the [address] [place] [address]. She was seen for a rash. The 3rd referral is to see Dr. Prema Parekh cardio Medicine at the [address] [place] [address]. She is seeing him for a possible blood clot in her leg. Please advise patient know if referrals will be done or if she needs to be seen.

## 2023-02-28 DIAGNOSIS — R21 RASH: Primary | ICD-10-CM

## 2023-02-28 DIAGNOSIS — M79.89 LEG SWELLING: ICD-10-CM

## 2023-04-05 DIAGNOSIS — J44.9 CHRONIC OBSTRUCTIVE PULMONARY DISEASE, UNSPECIFIED COPD TYPE (HCC): ICD-10-CM

## 2023-04-06 RX ORDER — ARFORMOTEROL TARTRATE 15 UG/2ML
SOLUTION RESPIRATORY (INHALATION)
Qty: 120 ML | Refills: 0 | Status: SHIPPED | OUTPATIENT
Start: 2023-04-06

## 2023-04-18 ENCOUNTER — HOSPITAL ENCOUNTER (OUTPATIENT)
Dept: CT IMAGING | Age: 63
Discharge: HOME | End: 2023-04-20
Payer: MEDICARE

## 2023-04-18 ENCOUNTER — OFFICE VISIT (OUTPATIENT)
Dept: PULMONOLOGY | Age: 63
End: 2023-04-18
Payer: MEDICARE

## 2023-04-18 VITALS
DIASTOLIC BLOOD PRESSURE: 78 MMHG | HEART RATE: 70 BPM | WEIGHT: 130 LBS | SYSTOLIC BLOOD PRESSURE: 132 MMHG | BODY MASS INDEX: 23.78 KG/M2 | TEMPERATURE: 97.3 F | OXYGEN SATURATION: 100 %

## 2023-04-18 DIAGNOSIS — J44.9 CHRONIC OBSTRUCTIVE PULMONARY DISEASE, UNSPECIFIED COPD TYPE (HCC): ICD-10-CM

## 2023-04-18 DIAGNOSIS — R91.1 NODULE OF RIGHT LUNG: ICD-10-CM

## 2023-04-18 DIAGNOSIS — R91.1 NODULE OF RIGHT LUNG: Primary | ICD-10-CM

## 2023-04-18 PROCEDURE — 99214 OFFICE O/P EST MOD 30 MIN: CPT | Performed by: INTERNAL MEDICINE

## 2023-04-18 PROCEDURE — 3017F COLORECTAL CA SCREEN DOC REV: CPT | Performed by: INTERNAL MEDICINE

## 2023-04-18 PROCEDURE — 3078F DIAST BP <80 MM HG: CPT | Performed by: INTERNAL MEDICINE

## 2023-04-18 PROCEDURE — 71250 CT THORAX DX C-: CPT

## 2023-04-18 PROCEDURE — 3023F SPIROM DOC REV: CPT | Performed by: INTERNAL MEDICINE

## 2023-04-18 PROCEDURE — G8427 DOCREV CUR MEDS BY ELIG CLIN: HCPCS | Performed by: INTERNAL MEDICINE

## 2023-04-18 PROCEDURE — 1036F TOBACCO NON-USER: CPT | Performed by: INTERNAL MEDICINE

## 2023-04-18 PROCEDURE — 3074F SYST BP LT 130 MM HG: CPT | Performed by: INTERNAL MEDICINE

## 2023-04-18 PROCEDURE — G8420 CALC BMI NORM PARAMETERS: HCPCS | Performed by: INTERNAL MEDICINE

## 2023-04-18 RX ORDER — TRIAMCINOLONE ACETONIDE 1 MG/G
CREAM TOPICAL
COMMUNITY
Start: 2023-02-21

## 2023-04-18 RX ORDER — UREA 200 MG/G
GEL TOPICAL DAILY PRN
COMMUNITY
Start: 2023-03-02

## 2023-04-18 RX ORDER — AMMONIUM LACTATE 12 G/100G
CREAM TOPICAL
COMMUNITY
Start: 2023-03-02

## 2023-04-18 ASSESSMENT — ENCOUNTER SYMPTOMS
RHINORRHEA: 0
SHORTNESS OF BREATH: 1
TROUBLE SWALLOWING: 0
EYE ITCHING: 0
CHEST TIGHTNESS: 0
COUGH: 0
VOMITING: 0
SINUS PRESSURE: 0
NAUSEA: 0
WHEEZING: 0
DIARRHEA: 0
SORE THROAT: 0
VOICE CHANGE: 0
EYE DISCHARGE: 0
ABDOMINAL PAIN: 0

## 2023-04-18 NOTE — PROGRESS NOTES
this type of lesion can be relatively slow and occur over several years. The primary differential for this lesion includes an inflammatory nodule or  granuloma which typically would improve on follow-up imaging, versus atypical adenomatous hyperplasia or early stage of adenocarcinoma. Consider CT follow-up in 6 months. 4 mm perifissural nodule again noted at the left base. Lungs are otherwise clear. Pleura:Normal. No effusion or thickening    Mediastinum :Mediastinum and marivel are unremarkable. Vessels:Thoracic aorta is intact. Bones:No acute osseous abnormalities. Other:None    Impression  NO SIGNIFICANT CHANGE IN NONSPECIFIC AREA ROUND GLASS LESION IN THE RIGHT UPPER LOBE. SUSPICION REMAINS HIGH FOR MALIGNANCY. CONTINUED FOLLOW-UP IS STRONGLY RECOMMENDED      Assessment/Plan:     1. Nodule of right lung  CT chest done today 4/18/23 and came for f/u, CT chest film reviewed with her. No significant oval change in area of sub solid nodularity or masslike appearance of irregular opacifications in the right lung apex up to around  2.7 cm as seen on prior without definitive progression. No acute pulmonary process otherwise. Refused PET scan  or Biopsy for further evaluation     - CT CHEST WO CONTRAST; Future    2. Chronic obstructive pulmonary disease, unspecified COPD type ([address]  She is using Brovana nebulizer twice a day , not using all the time. She also has  Nebulizer with albuterol and ipratropium 2 times a day when necessary and proair HFA 2 puffs when necessary. C/o shortness of breath with exertion. No  Wheezing. No Cough. No Chest tightness. No Chest pain with radiation or pleuritic pain. .  Continue bronchodilator therapy as before. Return in about 6 months (around 10/18/2023) for COPD, Ct chest f/u.       Tom Dubois MD

## 2023-05-03 ENCOUNTER — OFFICE VISIT (OUTPATIENT)
Dept: FAMILY MEDICINE CLINIC | Age: 63
End: 2023-05-03

## 2023-05-03 VITALS
HEART RATE: 70 BPM | HEIGHT: 62 IN | SYSTOLIC BLOOD PRESSURE: 120 MMHG | DIASTOLIC BLOOD PRESSURE: 82 MMHG | WEIGHT: 133.8 LBS | RESPIRATION RATE: 14 BRPM | BODY MASS INDEX: 24.62 KG/M2 | OXYGEN SATURATION: 97 %

## 2023-05-03 DIAGNOSIS — R19.8 GUM SYMPTOMS: ICD-10-CM

## 2023-05-03 DIAGNOSIS — E55.9 VITAMIN D DEFICIENCY: ICD-10-CM

## 2023-05-03 DIAGNOSIS — K62.5 RECTAL BLEEDING: ICD-10-CM

## 2023-05-03 DIAGNOSIS — R30.0 DYSURIA: ICD-10-CM

## 2023-05-03 DIAGNOSIS — R92.8 ABNORMAL ULTRASOUND OF BREAST: ICD-10-CM

## 2023-05-03 DIAGNOSIS — E78.00 PURE HYPERCHOLESTEROLEMIA: ICD-10-CM

## 2023-05-03 DIAGNOSIS — E78.00 PURE HYPERCHOLESTEROLEMIA: Primary | ICD-10-CM

## 2023-05-03 DIAGNOSIS — I65.23 BILATERAL CAROTID ARTERY STENOSIS: ICD-10-CM

## 2023-05-03 DIAGNOSIS — R94.31 ABNORMAL EKG: ICD-10-CM

## 2023-05-03 DIAGNOSIS — R22.31 MASS OF WRIST, RIGHT: ICD-10-CM

## 2023-05-03 LAB
ALBUMIN SERPL-MCNC: 3.7 G/DL (ref 3.5–4.6)
ALP SERPL-CCNC: 72 U/L (ref 40–130)
ALT SERPL-CCNC: 25 U/L (ref 0–33)
ANION GAP SERPL CALCULATED.3IONS-SCNC: 9 MEQ/L (ref 9–15)
AST SERPL-CCNC: 44 U/L (ref 0–35)
BASOPHILS # BLD: 0 K/UL (ref 0–0.2)
BASOPHILS NFR BLD: 0.9 %
BILIRUB SERPL-MCNC: 0.5 MG/DL (ref 0.2–0.7)
BUN SERPL-MCNC: 10 MG/DL (ref 8–23)
CALCIUM SERPL-MCNC: 8.7 MG/DL (ref 8.5–9.9)
CHLORIDE SERPL-SCNC: 97 MEQ/L (ref 95–107)
CHOLEST SERPL-MCNC: 109 MG/DL (ref 0–199)
CO2 SERPL-SCNC: 29 MEQ/L (ref 20–31)
CREAT SERPL-MCNC: 0.85 MG/DL (ref 0.5–0.9)
EOSINOPHIL # BLD: 0.2 K/UL (ref 0–0.7)
EOSINOPHIL NFR BLD: 3.8 %
ERYTHROCYTE [DISTWIDTH] IN BLOOD BY AUTOMATED COUNT: 15.1 % (ref 11.5–14.5)
GLOBULIN SER CALC-MCNC: 2.7 G/DL (ref 2.3–3.5)
GLUCOSE SERPL-MCNC: 74 MG/DL (ref 70–99)
HCT VFR BLD AUTO: 35.7 % (ref 37–47)
HDLC SERPL-MCNC: 73 MG/DL (ref 40–59)
HGB BLD-MCNC: 12.1 G/DL (ref 12–16)
LDLC SERPL CALC-MCNC: 29 MG/DL (ref 0–129)
LYMPHOCYTES # BLD: 2 K/UL (ref 1–4.8)
LYMPHOCYTES NFR BLD: 46.9 %
MCH RBC QN AUTO: 32.7 PG (ref 27–31.3)
MCHC RBC AUTO-ENTMCNC: 33.8 % (ref 33–37)
MCV RBC AUTO: 96.7 FL (ref 79.4–94.8)
MONOCYTES # BLD: 0.4 K/UL (ref 0.2–0.8)
MONOCYTES NFR BLD: 8.6 %
NEUTROPHILS # BLD: 1.7 K/UL (ref 1.4–6.5)
NEUTS SEG NFR BLD: 39.8 %
PLATELET # BLD AUTO: 167 K/UL (ref 130–400)
POTASSIUM SERPL-SCNC: 3.6 MEQ/L (ref 3.4–4.9)
PROT SERPL-MCNC: 6.4 G/DL (ref 6.3–8)
RBC # BLD AUTO: 3.69 M/UL (ref 4.2–5.4)
SODIUM SERPL-SCNC: 135 MEQ/L (ref 135–144)
TRIGL SERPL-MCNC: 37 MG/DL (ref 0–150)
WBC # BLD AUTO: 4.3 K/UL (ref 4.8–10.8)

## 2023-05-03 SDOH — ECONOMIC STABILITY: HOUSING INSECURITY
IN THE LAST 12 MONTHS, WAS THERE A TIME WHEN YOU DID NOT HAVE A STEADY PLACE TO SLEEP OR SLEPT IN A SHELTER (INCLUDING NOW)?: NO

## 2023-05-03 SDOH — ECONOMIC STABILITY: FOOD INSECURITY: WITHIN THE PAST 12 MONTHS, YOU WORRIED THAT YOUR FOOD WOULD RUN OUT BEFORE YOU GOT MONEY TO BUY MORE.: NEVER TRUE

## 2023-05-03 SDOH — ECONOMIC STABILITY: FOOD INSECURITY: WITHIN THE PAST 12 MONTHS, THE FOOD YOU BOUGHT JUST DIDN'T LAST AND YOU DIDN'T HAVE MONEY TO GET MORE.: NEVER TRUE

## 2023-05-03 SDOH — ECONOMIC STABILITY: INCOME INSECURITY: HOW HARD IS IT FOR YOU TO PAY FOR THE VERY BASICS LIKE FOOD, HOUSING, MEDICAL CARE, AND HEATING?: NOT HARD AT ALL

## 2023-05-03 ASSESSMENT — PATIENT HEALTH QUESTIONNAIRE - PHQ9
SUM OF ALL RESPONSES TO PHQ QUESTIONS 1-9: 0
2. FEELING DOWN, DEPRESSED OR HOPELESS: 0
SUM OF ALL RESPONSES TO PHQ QUESTIONS 1-9: 0
1. LITTLE INTEREST OR PLEASURE IN DOING THINGS: 0
SUM OF ALL RESPONSES TO PHQ9 QUESTIONS 1 & 2: 0
SUM OF ALL RESPONSES TO PHQ QUESTIONS 1-9: 0
SUM OF ALL RESPONSES TO PHQ QUESTIONS 1-9: 0

## 2023-05-03 NOTE — PROGRESS NOTES
Subjective:      Patient ID: Adela Marie is a 57 y.o. female    Oral Pain   Pertinent negatives include no fever. Wrist Pain   The current episode started 1 to 4 weeks ago. Pertinent negatives include no fever. Hyperlipidemia  The current episode started more than 1 year ago. Pertinent negatives include no chest pain. Current antihyperlipidemic treatment includes diet change. Here for physical.  Has had some right wrist pain over the last week or so. No injury. Has noted rectal bleeding recently. No abdominal pain  Has also had some soreness of gums recentlt . ---   Wondering about yeast infection in mouth. Would like ekg done -no chest pain. Also with occasional dysuria-none toda   Review of Systems   Constitutional:  Negative for fever and unexpected weight change. Cardiovascular:  Negative for chest pain. Gastrointestinal:  Positive for blood in stool. Musculoskeletal:  Positive for arthralgias. Skin:  Negative for rash. Neurological:  Negative for weakness.    Reviewed allergy, medical, social, surgical, family and med list changes and updated   Files     Social History     Socioeconomic History    Marital status: Single   Tobacco Use    Smoking status: Former     Packs/day: 0.50     Years: 30.00     Pack years: 15.00     Types: Cigarettes     Quit date: 2015     Years since quittin.4    Smokeless tobacco: Never   Substance and Sexual Activity    Alcohol use: No    Drug use: No     Social Determinants of Health     Financial Resource Strain: Low Risk     Difficulty of Paying Living Expenses: Not hard at all   Food Insecurity: No Food Insecurity    Worried About Running Out of Food in the Last Year: Never true    [address] in the Last Year: Never true   Transportation Needs: Unknown    Lack of Transportation (Non-Medical): No   Housing Stability: Unknown    Unstable Housing in the Last Year: No     Current Outpatient Medications   Medication Sig Dispense Refill    [employer]

## 2023-05-04 LAB — VITAMIN D 25-HYDROXY: 59.6 NG/ML

## 2023-05-04 ASSESSMENT — ENCOUNTER SYMPTOMS: BLOOD IN STOOL: 1

## 2023-05-05 LAB — BACTERIA UR CULT: NORMAL

## 2023-05-06 LAB — BACTERIA THROAT AEROBE CULT: NORMAL

## 2023-05-12 DIAGNOSIS — J44.9 CHRONIC OBSTRUCTIVE PULMONARY DISEASE, UNSPECIFIED COPD TYPE (HCC): ICD-10-CM

## 2023-05-12 NOTE — TELEPHONE ENCOUNTER
Rx requested:  Requested Prescriptions     Pending Prescriptions Disp Refills    ipratropium (ATROVENT) 0.02 % nebulizer solution 300 mL 3     Sig: Take 2.5 mLs by nebulization 4 times daily       Last Office Visit:   4/18/2023      Next Visit Date:  Future Appointments   Date Time Provider Mishel Huang   6/21/2023  7:30 AM [facility] ROOM [address] [place] [address]   6/21/2023  8:30 AM [address] [place] [address]   7/26/2023  7:30 AM [address] [facility]   10/19/2023  8:00 AM [facility] ROOM 1 [facility]   10/23/2023  9:30 AM [address], [address] [facility]

## 2023-06-19 DIAGNOSIS — J44.9 CHRONIC OBSTRUCTIVE PULMONARY DISEASE, UNSPECIFIED COPD TYPE (HCC): Primary | ICD-10-CM

## 2023-06-20 ENCOUNTER — TELEPHONE (OUTPATIENT)
Dept: FAMILY MEDICINE CLINIC | Age: 63
End: 2023-06-20

## 2023-06-21 ENCOUNTER — HOSPITAL ENCOUNTER (OUTPATIENT)
Dept: ULTRASOUND IMAGING | Age: 63
Discharge: HOME | End: 2023-06-23
Payer: MEDICARE

## 2023-06-21 ENCOUNTER — TELEPHONE (OUTPATIENT)
Dept: FAMILY MEDICINE CLINIC | Age: 63
End: 2023-06-21

## 2023-06-21 DIAGNOSIS — R92.8 ABNORMAL ULTRASOUND OF BREAST: ICD-10-CM

## 2023-06-21 PROCEDURE — 76641 ULTRASOUND BREAST COMPLETE: CPT

## 2023-07-10 DIAGNOSIS — J44.9 CHRONIC OBSTRUCTIVE PULMONARY DISEASE, UNSPECIFIED COPD TYPE (HCC): ICD-10-CM

## 2023-07-10 NOTE — TELEPHONE ENCOUNTER
Rx requested:  Requested Prescriptions     Pending Prescriptions Disp Refills    ipratropium (ATROVENT) 0.02 % nebulizer solution 300 mL 3     Sig: Take 2.5 mLs by nebulization 4 times daily       Last Office Visit:   4/18/2023      Next Visit Date:  Future Appointments   Date Time Provider [address]   7/26/2023  7:30 AM [facility] ROOM [address] [facility]   10/19/2023  8:00 AM [facility] ROOM 1 [facility]   10/23/2023  9:30 AM [facility], [address] [facility]

## 2023-07-26 ENCOUNTER — HOSPITAL ENCOUNTER (OUTPATIENT)
Dept: ULTRASOUND IMAGING | Age: 63
Discharge: HOME | End: 2023-07-28
Payer: MEDICARE

## 2023-07-26 DIAGNOSIS — I65.23 BILATERAL CAROTID ARTERY STENOSIS: ICD-10-CM

## 2023-07-26 PROCEDURE — 93880 EXTRACRANIAL BILAT STUDY: CPT

## 2023-08-01 ENCOUNTER — TELEPHONE (OUTPATIENT)
Dept: FAMILY MEDICINE CLINIC | Age: 63
End: 2023-08-01

## 2023-08-01 DIAGNOSIS — H57.89 EYE HEMORRHAGE: Primary | ICD-10-CM

## 2023-08-01 NOTE — TELEPHONE ENCOUNTER
PT called needs a referral for   [facility]  [facility] [address]  [address] [facility]  [phone]    PT has an appt today, needs a referral in place for today's date in case Insurance requests one. PT has a bleeder in the right eye.

## 2023-08-22 DIAGNOSIS — J44.9 CHRONIC OBSTRUCTIVE PULMONARY DISEASE, UNSPECIFIED COPD TYPE (HCC): ICD-10-CM

## 2023-08-22 RX ORDER — NEBULIZER ACCESSORIES
EACH MISCELLANEOUS
Qty: 1 EACH | Refills: 5 | Status: SHIPPED | OUTPATIENT
Start: 2023-08-22

## 2023-08-22 NOTE — TELEPHONE ENCOUNTER
PT NEEDS RX TO BE FAXED TO COMPANY SO SHE CAN GET NEBULIZER SUPPLIES CHEAPER.          PLEASE FAX RX TO [phone]        Rx requested:  Requested Prescriptions     Pending Prescriptions Disp Refills    Respiratory Therapy Supplies (NEBULIZER AIR TUBE/PLUGS) MISC 1 each 5     Sig: Nebulizer supply, cup and tubing   2 reusable nebulizer cup       Last Office Visit:   4/18/2023      Next Visit Date:  Future Appointments   Date Time Provider [facility] [address]   10/19/2023  8:00 AM [facility] ROOM 1 [facility]   10/23/2023  9:30 AM [address] Kanika Boogie MD [facility]

## 2023-09-07 ENCOUNTER — OFFICE VISIT (OUTPATIENT)
Dept: FAMILY MEDICINE CLINIC | Age: 63
End: 2023-09-07
Payer: MEDICARE

## 2023-09-07 ENCOUNTER — NURSE TRIAGE (OUTPATIENT)
Dept: OTHER | Facility: CLINIC | Age: 63
End: 2023-09-07

## 2023-09-07 VITALS
BODY MASS INDEX: 21.9 KG/M2 | WEIGHT: 119 LBS | OXYGEN SATURATION: 97 % | HEART RATE: 67 BPM | TEMPERATURE: 98 F | DIASTOLIC BLOOD PRESSURE: 62 MMHG | HEIGHT: 62 IN | SYSTOLIC BLOOD PRESSURE: 118 MMHG

## 2023-09-07 DIAGNOSIS — L30.9 DERMATITIS: Primary | ICD-10-CM

## 2023-09-07 PROCEDURE — 99213 OFFICE O/P EST LOW 20 MIN: CPT | Performed by: NURSE PRACTITIONER

## 2023-09-07 PROCEDURE — G8420 CALC BMI NORM PARAMETERS: HCPCS | Performed by: NURSE PRACTITIONER

## 2023-09-07 PROCEDURE — 3017F COLORECTAL CA SCREEN DOC REV: CPT | Performed by: NURSE PRACTITIONER

## 2023-09-07 PROCEDURE — 3074F SYST BP LT 130 MM HG: CPT | Performed by: NURSE PRACTITIONER

## 2023-09-07 PROCEDURE — 3078F DIAST BP <80 MM HG: CPT | Performed by: NURSE PRACTITIONER

## 2023-09-07 PROCEDURE — G8427 DOCREV CUR MEDS BY ELIG CLIN: HCPCS | Performed by: NURSE PRACTITIONER

## 2023-09-07 PROCEDURE — 1036F TOBACCO NON-USER: CPT | Performed by: NURSE PRACTITIONER

## 2023-09-07 RX ORDER — TRIAMCINOLONE ACETONIDE 1 MG/G
CREAM TOPICAL
Qty: 60 G | Refills: 0 | Status: SHIPPED | OUTPATIENT
Start: 2023-09-07

## 2023-09-07 ASSESSMENT — ENCOUNTER SYMPTOMS
COUGH: 0
RESPIRATORY NEGATIVE: 1
BLOOD IN STOOL: 0
WHEEZING: 0
SHORTNESS OF BREATH: 0

## 2023-09-07 NOTE — PROGRESS NOTES
[address] [facility] PRIMARY AND SPECIALTY CARE  [address] [address] [facility] [address]  Dept: [phone]  Dept Fax: [phone]  Loc: [facility] (: 1960) is a 57 y.o. female, Established patient, here for evaluation of the following chief complaint(s):  Skin Problem (Itchy/burning rash on bottom of jaw/And back of knee and inner thigh area/Has tried OTC creams)      PCP:  Gualberto Thibodeaux MD      Skin Problem  This is a new problem. The current episode started yesterday. The problem has been gradually worsening since onset. The affected locations include the face, groin, left upper leg and right upper leg (bilateral legs are near compression stockings at the top). The rash is characterized by itchiness. She was exposed to nothing. Pertinent negatives include no cough, fatigue, fever or shortness of breath. Past treatments include anti-itch cream. The treatment provided mild relief. Review of Systems   Constitutional: Negative. Negative for fatigue and fever. Respiratory: Negative. Negative for cough, shortness of breath and wheezing. Cardiovascular: Negative. Negative for chest pain, palpitations and leg swelling. Gastrointestinal:  Negative for blood in stool. Psychiatric/Behavioral: Negative. Negative for behavioral problems. The patient is not nervous/anxious.       Past Medical History:   Diagnosis Date    Acute respiratory failure (HCC)     Arthritis     general joints    Cataracts, bilateral 2017    COPD (chronic obstructive pulmonary disease) (HCC)     Fracture of coccyx (HCC)     History of non-ST elevation myocardial infarction (NSTEMI) 3/17/2016    History of tobacco abuse     past smoker / quit > 2 yrs    HTN (hypertension)     past trx    Lung disease     Tendinitis of wrist     BIALTERAL     Past Surgical History:   Procedure Laterality Date    BREAST SURGERY

## 2023-09-08 ENCOUNTER — TELEPHONE (OUTPATIENT)
Dept: FAMILY MEDICINE CLINIC | Age: 63
End: 2023-09-08

## 2023-09-15 ENCOUNTER — TELEPHONE (OUTPATIENT)
Dept: FAMILY MEDICINE CLINIC | Age: 63
End: 2023-09-15

## 2023-09-28 ENCOUNTER — TELEPHONE (OUTPATIENT)
Dept: FAMILY MEDICINE CLINIC | Age: 63
End: 2023-09-28

## 2023-10-19 ENCOUNTER — HOSPITAL ENCOUNTER (OUTPATIENT)
Dept: CT IMAGING | Age: 63
Discharge: HOME | End: 2023-10-21
Payer: MEDICARE

## 2023-10-19 DIAGNOSIS — R91.1 NODULE OF RIGHT LUNG: ICD-10-CM

## 2023-10-19 PROCEDURE — 71250 CT THORAX DX C-: CPT

## 2023-10-23 ENCOUNTER — TELEPHONE (OUTPATIENT)
Dept: PRIMARY CARE CLINIC | Age: 63
End: 2023-10-23

## 2023-10-23 ENCOUNTER — OFFICE VISIT (OUTPATIENT)
Dept: PULMONOLOGY | Age: 63
End: 2023-10-23
Payer: MEDICARE

## 2023-10-23 VITALS
OXYGEN SATURATION: 97 % | SYSTOLIC BLOOD PRESSURE: 138 MMHG | TEMPERATURE: 97.7 F | DIASTOLIC BLOOD PRESSURE: 88 MMHG | HEART RATE: 71 BPM | WEIGHT: 131 LBS | BODY MASS INDEX: 23.96 KG/M2

## 2023-10-23 DIAGNOSIS — R91.1 NODULE OF RIGHT LUNG: ICD-10-CM

## 2023-10-23 DIAGNOSIS — J44.9 CHRONIC OBSTRUCTIVE PULMONARY DISEASE, UNSPECIFIED COPD TYPE (HCC): Primary | ICD-10-CM

## 2023-10-23 PROCEDURE — G8484 FLU IMMUNIZE NO ADMIN: HCPCS | Performed by: INTERNAL MEDICINE

## 2023-10-23 PROCEDURE — 3023F SPIROM DOC REV: CPT | Performed by: INTERNAL MEDICINE

## 2023-10-23 PROCEDURE — 3017F COLORECTAL CA SCREEN DOC REV: CPT | Performed by: INTERNAL MEDICINE

## 2023-10-23 PROCEDURE — 1123F ACP DISCUSS/DSCN MKR DOCD: CPT | Performed by: INTERNAL MEDICINE

## 2023-10-23 PROCEDURE — 1036F TOBACCO NON-USER: CPT | Performed by: INTERNAL MEDICINE

## 2023-10-23 PROCEDURE — G8427 DOCREV CUR MEDS BY ELIG CLIN: HCPCS | Performed by: INTERNAL MEDICINE

## 2023-10-23 PROCEDURE — G8420 CALC BMI NORM PARAMETERS: HCPCS | Performed by: INTERNAL MEDICINE

## 2023-10-23 PROCEDURE — 3075F SYST BP GE 130 - 139MM HG: CPT | Performed by: INTERNAL MEDICINE

## 2023-10-23 PROCEDURE — 1090F PRES/ABSN URINE INCON ASSESS: CPT | Performed by: INTERNAL MEDICINE

## 2023-10-23 PROCEDURE — 3079F DIAST BP 80-89 MM HG: CPT | Performed by: INTERNAL MEDICINE

## 2023-10-23 PROCEDURE — 99214 OFFICE O/P EST MOD 30 MIN: CPT | Performed by: INTERNAL MEDICINE

## 2023-10-23 PROCEDURE — G8400 PT W/DXA NO RESULTS DOC: HCPCS | Performed by: INTERNAL MEDICINE

## 2023-10-23 ASSESSMENT — ENCOUNTER SYMPTOMS
EYE DISCHARGE: 0
ABDOMINAL PAIN: 0
SINUS PRESSURE: 0
SORE THROAT: 0
RHINORRHEA: 0
VOICE CHANGE: 0
VOMITING: 0
NAUSEA: 0
WHEEZING: 1
SHORTNESS OF BREATH: 1
CHEST TIGHTNESS: 0
EYE ITCHING: 0
TROUBLE SWALLOWING: 0
COUGH: 1
DIARRHEA: 0

## 2023-10-23 NOTE — PROGRESS NOTES
distension. Tenderness: There is no abdominal tenderness. There is no rebound. Musculoskeletal:         General: Normal range of motion. Lymphadenopathy:      Cervical: No cervical adenopathy. Skin:     General: Skin is warm and dry. Neurological:      Mental Status: She is alert and oriented to person, place, and time. Coordination: Coordination normal.   Psychiatric:         Mood and Affect: Mood normal.         Behavior: Behavior normal.         Current Outpatient Medications   Medication Sig Dispense Refill    triamcinolone (KENALOG) 0.1 % cream Apply topically 2 times daily prn rash 60 g 0    Respiratory Therapy Supplies (NEBULIZER AIR TUBE/PLUGS) MISC Nebulizer supply, cup and tubing   2 reusable nebulizer cup 1 each 5    ipratropium (ATROVENT) 0.02 % nebulizer solution Take 2.5 mLs by nebulization 4 times daily 300 mL 3    Handicap Placard MISC by Does not apply route 5 yrs starting 6-19-23 to 6-19-28 1 each 0    Elastic Bandages & Supports (MEDICAL COMPRESSION STOCKINGS) MISC 1 each by Does not apply route daily as needed 30 -40      ammonium lactate (AMLACTIN) 12 % cream       triamcinolone (KENALOG) 0.1 % cream       urea (CARMOL) 20 % cream Apply topically daily as needed      arformoterol tartrate (BROVANA) 15 MCG/2ML NEBU INHALE THE CONTENTS OF 1 VIAL VIA NEBULIZER TWICE DAILY FOR COPD 120 mL 0    albuterol (PROVENTIL) (2.5 MG/3ML) 0.083% nebulizer solution USE 1 VIAL IN NEBULIZER EVERY 4 HOURS AS NEEDED FOR WHEEZING 300 mL 3    Coenzyme Q10 (CO Q 10 PO) Take by mouth      hydrocortisone 2.5 % cream Apply topically 2 times daily x 2 weeks. 3.5 g 0    ketoconazole (NIZORAL) 2 % cream Apply topically daily x 2 weeks.  30 g 1    B Complex Vitamins (VITAMIN-B COMPLEX PO) Take 1 capsule by mouth daily      Lifitegrast (XIIDRA) 5 % SOLN Apply to eye      albuterol sulfate HFA (PROVENTIL HFA) 108 (90 Base) MCG/ACT inhaler Inhale 2 puffs into the lungs every 6 hours as needed for Wheezing 1

## 2023-11-01 DIAGNOSIS — J44.9 CHRONIC OBSTRUCTIVE PULMONARY DISEASE, UNSPECIFIED COPD TYPE (HCC): ICD-10-CM

## 2023-11-01 NOTE — TELEPHONE ENCOUNTER
Rx requested:      PATIENT WOULD LIKE A PRINTED SCRIPT SO SHE CAN PICK IT UP. PLEASE CALL PT WHEN SCRIPT IS READY. Requested Prescriptions     Pending Prescriptions Disp Refills    Nebulizers (AIRIAL COMPACT MINI NEBULIZER) MISC 1 each 0     Sig: PLEASE PROVIDE PATIENT WITH INNOSPIRE PORTABLE NEBULIZER UNIT.   HEALTH CARE SOLUTIONS       Last Office Visit:   10/23/2023      Next Visit Date:  Future Appointments   Date Time Provider [address]   4/23/2024  8:30 AM Gerri Hammer MD [facility]

## 2023-11-07 ENCOUNTER — TELEPHONE (OUTPATIENT)
Dept: FAMILY MEDICINE CLINIC | Age: 63
End: 2023-11-07

## 2023-11-15 DIAGNOSIS — J44.9 CHRONIC OBSTRUCTIVE PULMONARY DISEASE, UNSPECIFIED COPD TYPE (HCC): ICD-10-CM

## 2023-11-15 RX ORDER — ALBUTEROL SULFATE 2.5 MG/3ML
SOLUTION RESPIRATORY (INHALATION)
Qty: 300 ML | Refills: 3 | Status: SHIPPED | OUTPATIENT
Start: 2023-11-15

## 2023-11-15 RX ORDER — ALBUTEROL SULFATE 90 UG/1
2 AEROSOL, METERED RESPIRATORY (INHALATION) EVERY 6 HOURS PRN
Qty: 1 EACH | Refills: 3 | Status: SHIPPED | OUTPATIENT
Start: 2023-11-15

## 2024-01-15 ENCOUNTER — TELEPHONE (OUTPATIENT)
Dept: FAMILY MEDICINE CLINIC | Age: 64
End: 2024-01-15

## 2024-01-15 DIAGNOSIS — H53.9 VISION CHANGES: Primary | ICD-10-CM

## 2024-01-15 DIAGNOSIS — H57.89 EYE HEMORRHAGE: ICD-10-CM

## 2024-01-15 NOTE — TELEPHONE ENCOUNTER
Patient states her insurance company informed her due to the new year, she will need new referrals placed to Ophthalmology.    [facility] Dr. Ignacia Gray and the other to [facility] - Dr. Kelton Hatch

## 2024-01-31 ENCOUNTER — TELEPHONE (OUTPATIENT)
Dept: FAMILY MEDICINE CLINIC | Age: 64
End: 2024-01-31

## 2024-01-31 NOTE — TELEPHONE ENCOUNTER
Pt is asking for a new referral for her two Ophthalmologists. Jamey is asking for the referral to say Out of  since both eye doctors are not associated with [facility].   The eye doctors the pt is trying to get seen by are:    [facility]  [phone]           &  Dr. Douglas Quach  [phone]

## 2024-02-05 NOTE — PROGRESS NOTES
2.27 Subjective:      Patient ID: Reza Calzada is a 56 y.o. female who presents today for:     Chief Complaint   Patient presents with    Skin Problem     [facility] and cracked skin.  Health Maintenance     Patient declines any HM today. HPI Pt report that for years she has been having an issue with skin on hands cracking and opening up. Currently she has a crack on left middle and left ring finger. She has them wrapped and they are currently healing well, but she would like to f/u with derm to see about long term treatment. Denies any drainage. It is extremely itchy when it happens. She also reports skin tag near rectal area. Declines evaluation today, but will speak to derm about it. She also reports she will likely be following up with podiatry for insoles for shoes and may need a referral, but will let us know. Past Medical History:   Diagnosis Date    Acute respiratory failure ([facility] [address]     Arthritis     general joints    Cataracts, bilateral 05/03/2017    COPD (chronic obstructive pulmonary disease) (HCC)     Fracture of coccyx (HCC)     History of non-ST elevation myocardial infarction (NSTEMI) 3/17/2016    History of tobacco abuse     past smoker / quit > 2 yrs    HTN (hypertension)     past trx    Lung disease     Tendinitis of wrist     BIALTERAL     Past Surgical History:   Procedure Laterality Date    BREAST SURGERY      bilateral implants    CARDIAC CATHETERIZATION  3/2016    CATARACT REMOVAL Left 10/12/15     DR. SANTIAGO    CATARACT REMOVAL WITH IMPLANT Right 11/2/15     DR. Foster [facility]    CHOLECYSTECTOMY, LAPAROSCOPIC N/A 7/14/2017    CHOLECYSTECTOMY LAPAROSCOPIC - CHOLANGIOGRAM POSS OPEN performed by Brian R[facility]  2005    EYE SURGERY      Phaco with IOL OU    TUBAL LIGATION       Family History   Problem Relation Age of Onset    Heart Disease Father     High Blood Inhaler 3    Nebulizers (AIRIAL COMPACT MINI NEBULIZER) MISC PLEASE PROVIDE PATIENT WITH INNOSPIRE PORTABLE NEBULIZER UNIT. HEALTH CARE SOLUTIONS 1 each 0    Carboxymethylcell-Hypromellose (GENTEAL OP) Apply to eye      Respiratory Therapy Supplies (NEBULIZER AIR TUBE/PLUGS) MISC Nebulizer supply, cup and tubing 1 each 0    POTASSIUM PO Take by mouth      vitamin E 1000 units capsule Take 1,000 Units by mouth daily      magnesium 30 MG tablet Take 30 mg by mouth daily      vitamin B-12 (CYANOCOBALAMIN) 100 MCG tablet Take 50 mcg by mouth daily      ipratropium (ATROVENT) 0.02 % nebulizer solution INHALE 1 VIAL BY NEBULIZER 4 TIMES DAILY (Patient not taking: Reported on 10/8/2020) 300 mL 0    Arformoterol Tartrate (BROVANA) 15 MCG/2ML NEBU Take 2 mLs by nebulization 2 times daily use 1 vial in nebulizer twice a day DX COPD J44.9 (Patient not taking: Reported on 10/8/2020) 360 mL 3     No current facility-administered medications on file prior to visit. Allergies:  Pcn [penicillins]; Corticosteroids; Lidocaine; Contrast [iodides]; Glycerin; Naprosyn [naproxen]; and Valium [diazepam]    Review of Systems   Skin: Positive for wound. Objective:   /66 (Site: Left Upper Arm, Position: Sitting, Cuff Size: Medium Adult)   Pulse 83   Temp 98.4 °F (36.9 °C) (Temporal)   Ht 5' 2\" (1.575 m)   Wt 132 lb 9.6 oz (60.1 kg)   SpO2 96%   Breastfeeding No   BMI 24.25 kg/m²     Physical Exam  Constitutional:       Appearance: She is well-developed. HENT:      Head: Normocephalic. Right Ear: External ear normal.      Left Ear: External ear normal.      Nose: Nose normal.      Mouth/Throat:      Mouth: Mucous membranes are moist.      Pharynx: Oropharynx is clear. Eyes:      General:         Right eye: No discharge. Left eye: No discharge. Conjunctiva/sclera: Conjunctivae normal.   Cardiovascular:      Rate and Rhythm: Normal rate.    Pulmonary:      Effort: Pulmonary effort is normal. No respiratory distress. Musculoskeletal:      Left hand: She exhibits no tenderness, no laceration and no swelling. Hands:    Skin:     General: Skin is warm and dry. Neurological:      Mental Status: She is alert and oriented to person, place, and time. Psychiatric:         Mood and Affect: Mood normal.         Behavior: Behavior normal.         Assessment:          Diagnosis Orders   1. Hand dermatitis  External Referral to Dermatology   2. History of open hand wound  External Referral to Dermatology   3. Itch  External Referral to Dermatology       Plan:      Orders Placed This Encounter   Procedures    External Referral to Dermatology     Referral Priority:   Routine     Referral Type:   Eval and Treat     Referral Reason:   Specialty Services Required     Requested Specialty:   Dermatology     Number of Visits Requested:   1        No orders of the defined types were placed in this encounter. Return if symptoms worsen or fail to improve. Per pt report tends to be chronic issue with weather changes. She declines topical steroid due to previous reaction. Will refer to derm for further evaluation. Reviewed with the patient: current clinicalstatus, medications, activities and diet. Side effects, adverse effects of the medication prescribedtoday, as well as treatment plan/ rationale and result expectations have been discussedwith the patient who expresses understanding and desires to proceed. Close follow upto evaluate treatment results and for coordination of care. I have reviewedthe patient's medical history in detail and updated the computerized patient record.     LIMA De La Garza - CNP 2.39 2.27 2.39 2.27 2.21

## 2024-02-19 ENCOUNTER — TELEPHONE (OUTPATIENT)
Dept: FAMILY MEDICINE CLINIC | Age: 64
End: 2024-02-19

## 2024-02-28 ENCOUNTER — TELEPHONE (OUTPATIENT)
Dept: FAMILY MEDICINE CLINIC | Age: 64
End: 2024-02-28

## 2024-02-28 NOTE — TELEPHONE ENCOUNTER
Pt wanted to let her PCP know that she went to go see an eye doctor on 02/27/2024. In case her insurance calls and asks.    [address]  [place] [id number]  [phone]  02/27/2024 Dr. Hatch  4pm appt

## 2024-02-28 NOTE — TELEPHONE ENCOUNTER
GILBERTO I have tried calling [employer] at patient's request [phone]. Can not get through it requires a code. Also tried refaxing forms to [phone] will not go through.

## 2024-04-11 ENCOUNTER — HOSPITAL ENCOUNTER (OUTPATIENT)
Dept: CT IMAGING | Age: 64
Discharge: HOME | End: 2024-04-13
Payer: MEDICARE

## 2024-04-11 DIAGNOSIS — R91.1 NODULE OF RIGHT LUNG: ICD-10-CM

## 2024-04-11 PROCEDURE — 71250 CT THORAX DX C-: CPT

## 2024-04-23 ENCOUNTER — TELEPHONE (OUTPATIENT)
Dept: FAMILY MEDICINE CLINIC | Age: 64
End: 2024-04-23

## 2024-04-23 ENCOUNTER — OFFICE VISIT (OUTPATIENT)
Dept: PULMONOLOGY | Age: 64
End: 2024-04-23
Payer: MEDICARE

## 2024-04-23 VITALS
WEIGHT: 147 LBS | OXYGEN SATURATION: 96 % | BODY MASS INDEX: 26.89 KG/M2 | HEART RATE: 101 BPM | SYSTOLIC BLOOD PRESSURE: 126 MMHG | DIASTOLIC BLOOD PRESSURE: 82 MMHG

## 2024-04-23 DIAGNOSIS — J44.9 CHRONIC OBSTRUCTIVE PULMONARY DISEASE, UNSPECIFIED COPD TYPE (HCC): Primary | ICD-10-CM

## 2024-04-23 DIAGNOSIS — R91.1 NODULE OF RIGHT LUNG: ICD-10-CM

## 2024-04-23 PROCEDURE — 3074F SYST BP LT 130 MM HG: CPT | Performed by: INTERNAL MEDICINE

## 2024-04-23 PROCEDURE — 99214 OFFICE O/P EST MOD 30 MIN: CPT | Performed by: INTERNAL MEDICINE

## 2024-04-23 PROCEDURE — 3079F DIAST BP 80-89 MM HG: CPT | Performed by: INTERNAL MEDICINE

## 2024-04-23 PROCEDURE — G8400 PT W/DXA NO RESULTS DOC: HCPCS | Performed by: INTERNAL MEDICINE

## 2024-04-23 PROCEDURE — G8427 DOCREV CUR MEDS BY ELIG CLIN: HCPCS | Performed by: INTERNAL MEDICINE

## 2024-04-23 PROCEDURE — 1123F ACP DISCUSS/DSCN MKR DOCD: CPT | Performed by: INTERNAL MEDICINE

## 2024-04-23 PROCEDURE — 1036F TOBACCO NON-USER: CPT | Performed by: INTERNAL MEDICINE

## 2024-04-23 PROCEDURE — 3023F SPIROM DOC REV: CPT | Performed by: INTERNAL MEDICINE

## 2024-04-23 PROCEDURE — G8419 CALC BMI OUT NRM PARAM NOF/U: HCPCS | Performed by: INTERNAL MEDICINE

## 2024-04-23 PROCEDURE — 1090F PRES/ABSN URINE INCON ASSESS: CPT | Performed by: INTERNAL MEDICINE

## 2024-04-23 PROCEDURE — 3017F COLORECTAL CA SCREEN DOC REV: CPT | Performed by: INTERNAL MEDICINE

## 2024-04-23 ASSESSMENT — ENCOUNTER SYMPTOMS
CHEST TIGHTNESS: 0
WHEEZING: 0
VOMITING: 0
NAUSEA: 0
ABDOMINAL PAIN: 0
DIARRHEA: 0
VOICE CHANGE: 0
COUGH: 0
SORE THROAT: 0
EYE ITCHING: 0
SINUS PRESSURE: 0
RHINORRHEA: 0
SHORTNESS OF BREATH: 0
EYE DISCHARGE: 0
TROUBLE SWALLOWING: 0

## 2024-04-23 NOTE — TELEPHONE ENCOUNTER
Patient came into office today asking for U/S for mammogram     Also put in another order for a Vascular duplex carotid bilateral same date as before 1 year later 7/26/24    Patient asking for a referral to Podiatry to cut toe nails for faciatis     Patient also asking for a RX of BETADINE liquid   Please call patient to come  RX at  [phone]     Thank you

## 2024-04-23 NOTE — PROGRESS NOTES
am    TECHNIQUE:  CT of the chest was performed without the administration of intravenous  contrast. Multiplanar reformatted images are provided for review. Automated  exposure control, iterative reconstruction, and/or weight based adjustment of  the mA/kV was utilized to reduce the radiation dose to as low as reasonably  achievable.    COMPARISON:  CT chest dated 04/18/2023, CT chest dated 10/07/2022    HISTORY:  ORDERING SYSTEM PROVIDED HISTORY: Nodule of right lung  TECHNOLOGIST PROVIDED HISTORY:  What reading provider will be dictating this exam?->CRC    FINDINGS:  Mediastinum: Thyroid is homogeneous in attenuation. No bulky mediastinal  adenopathy. Central airways are patent. Esophagus with normal course and  caliber.  Cardiac size enlarged without pericardial effusion.    Lungs/pleura: Lungs are grossly clear from consolidation with right apical  irregular subsolid appearing ground-glass density again noted at 2.7 x 1.8 cm  unchanged from prior without progression.  No new nodule mass.  No pleural  effusion or pleural process.    Upper Abdomen: Visualized portions of the upper abdomen unremarkable.    Soft Tissues/Bones: No acute osseous or soft tissue findings. No aggressive  osseous lesion.    Impression  No interval change in partially sub solid ground-glass appearing area right  lung apex around 2.7 cm maximum similar to 04/18/2023 comparison.  No new  nodule or mass.      Results for orders placed during the hospital encounter of 04/18/23    CT CHEST WO CONTRAST    Narrative  EXAMINATION:  CT OF THE CHEST WITHOUT CONTRAST 4/18/2023 8:17 am    TECHNIQUE:  CT of the chest was performed without the administration of intravenous  contrast. Multiplanar reformatted images are provided for review. Automated  exposure control, iterative reconstruction, and/or weight based adjustment of  the mA/kV was utilized to reduce the radiation dose to as low as reasonably  achievable.    COMPARISON:  CT chest dated

## 2024-04-24 ENCOUNTER — OFFICE VISIT (OUTPATIENT)
Dept: FAMILY MEDICINE CLINIC | Age: 64
End: 2024-04-24
Payer: MEDICARE

## 2024-04-24 VITALS
WEIGHT: 152 LBS | TEMPERATURE: 97.8 F | DIASTOLIC BLOOD PRESSURE: 80 MMHG | HEART RATE: 78 BPM | BODY MASS INDEX: 27.97 KG/M2 | HEIGHT: 62 IN | OXYGEN SATURATION: 99 % | SYSTOLIC BLOOD PRESSURE: 122 MMHG

## 2024-04-24 DIAGNOSIS — L60.2 NAIL THICKENING: ICD-10-CM

## 2024-04-24 DIAGNOSIS — R92.8 ABNORMAL ULTRASOUND OF BREAST: ICD-10-CM

## 2024-04-24 DIAGNOSIS — E78.00 PURE HYPERCHOLESTEROLEMIA: ICD-10-CM

## 2024-04-24 DIAGNOSIS — E55.9 VITAMIN D DEFICIENCY: ICD-10-CM

## 2024-04-24 DIAGNOSIS — M21.619 BUNION: ICD-10-CM

## 2024-04-24 DIAGNOSIS — I65.23 BILATERAL CAROTID ARTERY STENOSIS: Primary | ICD-10-CM

## 2024-04-24 PROCEDURE — G8419 CALC BMI OUT NRM PARAM NOF/U: HCPCS | Performed by: FAMILY MEDICINE

## 2024-04-24 PROCEDURE — 3017F COLORECTAL CA SCREEN DOC REV: CPT | Performed by: FAMILY MEDICINE

## 2024-04-24 PROCEDURE — 1036F TOBACCO NON-USER: CPT | Performed by: FAMILY MEDICINE

## 2024-04-24 PROCEDURE — G8427 DOCREV CUR MEDS BY ELIG CLIN: HCPCS | Performed by: FAMILY MEDICINE

## 2024-04-24 PROCEDURE — G8400 PT W/DXA NO RESULTS DOC: HCPCS | Performed by: FAMILY MEDICINE

## 2024-04-24 PROCEDURE — 1123F ACP DISCUSS/DSCN MKR DOCD: CPT | Performed by: FAMILY MEDICINE

## 2024-04-24 PROCEDURE — 3079F DIAST BP 80-89 MM HG: CPT | Performed by: FAMILY MEDICINE

## 2024-04-24 PROCEDURE — 99214 OFFICE O/P EST MOD 30 MIN: CPT | Performed by: FAMILY MEDICINE

## 2024-04-24 PROCEDURE — 3074F SYST BP LT 130 MM HG: CPT | Performed by: FAMILY MEDICINE

## 2024-04-24 PROCEDURE — 1090F PRES/ABSN URINE INCON ASSESS: CPT | Performed by: FAMILY MEDICINE

## 2024-04-24 RX ORDER — ACETAMINOPHEN 650 MG
TABLET, EXTENDED RELEASE ORAL
Status: CANCELLED | OUTPATIENT
Start: 2024-04-24 | End: 2024-05-01

## 2024-04-24 RX ORDER — ACETAMINOPHEN 650 MG
TABLET, EXTENDED RELEASE ORAL
Qty: 118 ML | Refills: 0 | Status: SHIPPED | OUTPATIENT
Start: 2024-04-24 | End: 2024-05-01

## 2024-04-24 ASSESSMENT — PATIENT HEALTH QUESTIONNAIRE - PHQ9
SUM OF ALL RESPONSES TO PHQ9 QUESTIONS 1 & 2: 0
2. FEELING DOWN, DEPRESSED OR HOPELESS: NOT AT ALL
SUM OF ALL RESPONSES TO PHQ QUESTIONS 1-9: 0
1. LITTLE INTEREST OR PLEASURE IN DOING THINGS: NOT AT ALL
SUM OF ALL RESPONSES TO PHQ QUESTIONS 1-9: 0

## 2024-04-24 NOTE — PROGRESS NOTES
nebulization 4 times daily 300 mL 3    albuterol (PROVENTIL) (2.5 MG/3ML) 0.083% nebulizer solution USE 1 VIAL IN NEBULIZER EVERY 4 HOURS AS NEEDED FOR WHEEZING 300 mL 3    albuterol sulfate HFA (PROVENTIL HFA) 108 (90 Base) MCG/ACT inhaler Inhale 2 puffs into the lungs every 6 hours as needed for Wheezing 1 each 3    Nebulizers (AIRIAL COMPACT MINI NEBULIZER) MISC PLEASE PROVIDE PATIENT WITH INNOSPIRE PORTABLE NEBULIZER UNIT.  HEALTH CARE SOLUTIONS 1 each 0    triamcinolone (KENALOG) 0.1 % cream Apply topically 2 times daily prn rash 60 g 0    Respiratory Therapy Supplies (NEBULIZER AIR TUBE/PLUGS) MISC Nebulizer supply, cup and tubing   2 reusable nebulizer cup 1 each 5    Handicap Placard MISC by Does not apply route 5 yrs starting 6-19-23 to 6-19-28 1 each 0    Elastic Bandages & Supports (MEDICAL COMPRESSION STOCKINGS) MISC 1 each by Does not apply route daily as needed 30 -40      triamcinolone (KENALOG) 0.1 % cream       urea (CARMOL) 20 % cream Apply topically daily as needed      arformoterol tartrate (BROVANA) 15 MCG/2ML NEBU INHALE THE CONTENTS OF 1 VIAL VIA NEBULIZER TWICE DAILY FOR COPD 120 mL 0    Coenzyme Q10 (CO Q 10 PO) Take by mouth      hydrocortisone 2.5 % cream Apply topically 2 times daily x 2 weeks. 3.5 g 0    ketoconazole (NIZORAL) 2 % cream Apply topically daily x 2 weeks. 30 g 1    B Complex Vitamins (VITAMIN-B COMPLEX PO) Take 1 capsule by mouth daily      Lifitegrast (XIIDRA) 5 % SOLN Apply to eye      Carboxymethylcell-Hypromellose (GENTEAL OP) Apply to eye      POTASSIUM PO Take by mouth      vitamin E 1000 units capsule Take 1 capsule by mouth daily      magnesium 30 MG tablet Take 1 tablet by mouth daily      vitamin B-12 (CYANOCOBALAMIN) 100 MCG tablet Take 0.5 tablets by mouth daily       No current facility-administered medications for this visit.     Family History   Problem Relation Age of Onset    Heart Disease Father     High Blood Pressure Father      Past Medical History:

## 2024-04-25 DIAGNOSIS — J44.9 CHRONIC OBSTRUCTIVE PULMONARY DISEASE, UNSPECIFIED COPD TYPE (HCC): ICD-10-CM

## 2024-04-25 PROBLEM — M21.619 BUNION: Status: ACTIVE | Noted: 2024-04-25

## 2024-04-25 RX ORDER — ALBUTEROL SULFATE 2.5 MG/3ML
SOLUTION RESPIRATORY (INHALATION)
Qty: 300 ML | Refills: 3 | Status: SHIPPED | OUTPATIENT
Start: 2024-04-25

## 2024-04-25 NOTE — TELEPHONE ENCOUNTER
Rx requested:  Requested Prescriptions     Pending Prescriptions Disp Refills    albuterol (PROVENTIL) (2.5 MG/3ML) 0.083% nebulizer solution 300 mL 3     Sig: USE 1 VIAL IN NEBULIZER EVERY 4 HOURS AS NEEDED FOR WHEEZING    ipratropium (ATROVENT) 0.02 % nebulizer solution 300 mL 3     Sig: Take 2.5 mLs by nebulization 4 times daily       Last Office Visit:   4/23/2024      Next Visit Date:  Future Appointments   Date Time Provider Department Center   4/25/2025  9:15 AM Paulo Mittal MD [facility]

## 2024-05-02 ENCOUNTER — HOSPITAL ENCOUNTER (OUTPATIENT)
Dept: ULTRASOUND IMAGING | Age: 64
Discharge: HOME | End: 2024-05-04
Payer: MEDICARE

## 2024-05-02 DIAGNOSIS — R92.8 ABNORMAL ULTRASOUND OF BREAST: ICD-10-CM

## 2024-05-02 PROCEDURE — 76641 ULTRASOUND BREAST COMPLETE: CPT

## 2024-05-09 ENCOUNTER — HOSPITAL ENCOUNTER (OUTPATIENT)
Dept: LAB | Age: 64
Discharge: HOME | End: 2024-05-09
Payer: MEDICARE

## 2024-05-09 LAB
ALBUMIN SERPL-MCNC: 4.2 G/DL (ref 3.5–4.6)
ALP SERPL-CCNC: 56 U/L (ref 40–130)
ALT SERPL-CCNC: 17 U/L (ref 0–33)
ANION GAP SERPL CALCULATED.3IONS-SCNC: 12 MEQ/L (ref 9–15)
AST SERPL-CCNC: 29 U/L (ref 0–35)
BASOPHILS # BLD: 0 K/UL (ref 0–0.2)
BASOPHILS NFR BLD: 0.6 %
BILIRUB SERPL-MCNC: 0.6 MG/DL (ref 0.2–0.7)
BUN SERPL-MCNC: 7 MG/DL (ref 8–23)
CALCIUM SERPL-MCNC: 9.7 MG/DL (ref 8.5–9.9)
CHLORIDE SERPL-SCNC: 100 MEQ/L (ref 95–107)
CHOLEST SERPL-MCNC: 140 MG/DL (ref 0–199)
CO2 SERPL-SCNC: 26 MEQ/L (ref 20–31)
CREAT SERPL-MCNC: 0.75 MG/DL (ref 0.5–0.9)
EOSINOPHIL # BLD: 0.2 K/UL (ref 0–0.7)
EOSINOPHIL NFR BLD: 3.7 %
ERYTHROCYTE [DISTWIDTH] IN BLOOD BY AUTOMATED COUNT: 14.3 % (ref 11.5–14.5)
GLOBULIN SER CALC-MCNC: 3 G/DL (ref 2.3–3.5)
GLUCOSE SERPL-MCNC: 86 MG/DL (ref 70–99)
HCT VFR BLD AUTO: 39.3 % (ref 37–47)
HDLC SERPL-MCNC: 69 MG/DL (ref 40–59)
HGB BLD-MCNC: 12.9 G/DL (ref 12–16)
LDLC SERPL CALC-MCNC: 60 MG/DL (ref 0–129)
LYMPHOCYTES # BLD: 1.7 K/UL (ref 1–4.8)
LYMPHOCYTES NFR BLD: 34.7 %
MCH RBC QN AUTO: 30.5 PG (ref 27–31.3)
MCHC RBC AUTO-ENTMCNC: 32.8 % (ref 33–37)
MCV RBC AUTO: 92.9 FL (ref 79.4–94.8)
MONOCYTES # BLD: 0.4 K/UL (ref 0.2–0.8)
MONOCYTES NFR BLD: 8.7 %
NEUTROPHILS # BLD: 2.6 K/UL (ref 1.4–6.5)
NEUTS SEG NFR BLD: 52.1 %
PLATELET # BLD AUTO: 210 K/UL (ref 130–400)
POTASSIUM SERPL-SCNC: 3.8 MEQ/L (ref 3.4–4.9)
PROT SERPL-MCNC: 7.2 G/DL (ref 6.3–8)
RBC # BLD AUTO: 4.23 M/UL (ref 4.2–5.4)
SODIUM SERPL-SCNC: 138 MEQ/L (ref 135–144)
TRIGL SERPL-MCNC: 55 MG/DL (ref 0–150)
WBC # BLD AUTO: 4.9 K/UL (ref 4.8–10.8)

## 2024-05-09 PROCEDURE — 85025 COMPLETE CBC W/AUTO DIFF WBC: CPT

## 2024-05-09 PROCEDURE — 80053 COMPREHEN METABOLIC PANEL: CPT

## 2024-05-09 PROCEDURE — 82306 VITAMIN D 25 HYDROXY: CPT

## 2024-05-09 PROCEDURE — 36415 COLL VENOUS BLD VENIPUNCTURE: CPT

## 2024-05-09 PROCEDURE — 80061 LIPID PANEL: CPT

## 2024-05-10 LAB — VITAMIN D 25-HYDROXY: 71.4 NG/ML (ref 30–100)

## 2024-05-15 ENCOUNTER — TELEPHONE (OUTPATIENT)
Dept: PAIN MANAGEMENT | Age: 64
End: 2024-05-15

## 2024-05-15 NOTE — TELEPHONE ENCOUNTER
Patient called and stated that she received a letter in the mail from her insurance stating that her consult with Dr. Denise will not be covered through them unless it is a 45 minute appointment. She is inquiring about possibly extending her June 12 appt by 15 minutes so that she may keep the same date or will she have to reschedule to accommodate her insurance request.

## 2024-05-16 NOTE — TELEPHONE ENCOUNTER
Patient was updated per last message. Stated that she will call back on Tuesday for an update as she is unable to receive calls at this time and does not have an alternate number to offer.

## 2024-05-17 NOTE — TELEPHONE ENCOUNTER
Unable to leave a voicemail for pt. I wanted to let the patient to be aware. The appointment on 6/12/24 is for Consultation and she may have to come back for procedures.

## 2024-06-12 ENCOUNTER — INITIAL CONSULT (OUTPATIENT)
Dept: PODIATRY | Age: 64
End: 2024-06-12

## 2024-06-12 ENCOUNTER — TELEPHONE (OUTPATIENT)
Dept: PODIATRY | Age: 64
End: 2024-06-12

## 2024-06-12 VITALS — TEMPERATURE: 97 F | HEIGHT: 62 IN | BODY MASS INDEX: 27.8 KG/M2

## 2024-06-12 DIAGNOSIS — M79.674 PAIN IN TOES OF BOTH FEET: Primary | ICD-10-CM

## 2024-06-12 DIAGNOSIS — M79.675 PAIN IN TOES OF BOTH FEET: Primary | ICD-10-CM

## 2024-06-12 DIAGNOSIS — R60.0 LOWER EXTREMITY EDEMA: ICD-10-CM

## 2024-06-12 DIAGNOSIS — I89.0 LYMPHEDEMA: ICD-10-CM

## 2024-06-12 DIAGNOSIS — B35.1 DERMATOPHYTOSIS OF NAIL: ICD-10-CM

## 2024-06-12 DIAGNOSIS — L60.8 INCURVATED NAIL: ICD-10-CM

## 2024-06-13 ASSESSMENT — ENCOUNTER SYMPTOMS
NAUSEA: 0
VOMITING: 0
SHORTNESS OF BREATH: 0
BACK PAIN: 1

## 2024-06-13 NOTE — PROGRESS NOTES
[facility] [place] SPECIALTY CARE, [facility] PODIATRY  [address]  [place] [address]  Dept: [phone]  Loc: [phone]       Leni Regan  (1960)    6/13/24    Subjective     Leni Regan is 63 y.o. female who complains today of:    Chief Complaint   Patient presents with    Nail Problem       HPI: Patient presents with complaint of painful toenails to both feet.  Patient states that she stopped attempting to trim her own toenails due to repeated injury to the skin.  Patient reports significant difficulty cutting the toenails due to the thickness and deformity of the nail plates.  She states she last injured her left great toe a few weeks ago.  She states that she cut a large section of the tip of the toe off causing bleeding, she did not observe signs of infection.  Patient denies a history of diabetes.  Patient does report history of lymphedema, she currently uses compression stockings, she attended lymphedema clinic in the past.    Review of Systems   Constitutional:  Negative for chills and fever.   HENT:  Negative for hearing loss.    Respiratory:  Negative for shortness of breath.    Cardiovascular:  Negative for chest pain.   Gastrointestinal:  Negative for nausea and vomiting.   Genitourinary:  Negative for difficulty urinating.   Musculoskeletal:  Positive for back pain and gait problem.   Skin:  Positive for wound.   Neurological:  Positive for numbness.   Hematological:  Does not bruise/bleed easily.   Psychiatric/Behavioral:  Negative for sleep disturbance.        The patient is not a diabetic.   PCP/ Endocrinologist: Eduardo Wick MD   Date last seen: 4/24/24    Allergies:  Pcn [penicillins], Bextra [valdecoxib], Corticosteroids, Lidocaine, Contrast [iodides], Glycerin, Naprosyn [naproxen], and Valium [diazepam]    Current Outpatient Medications on File Prior to Visit   Medication Sig Dispense Refill    albuterol (PROVENTIL) (2.5

## 2024-07-24 ENCOUNTER — HOSPITAL ENCOUNTER (OUTPATIENT)
Dept: ULTRASOUND IMAGING | Age: 64
Discharge: HOME | End: 2024-07-26
Payer: MEDICARE

## 2024-07-24 DIAGNOSIS — I65.23 BILATERAL CAROTID ARTERY STENOSIS: ICD-10-CM

## 2024-07-24 PROCEDURE — 93880 EXTRACRANIAL BILAT STUDY: CPT

## 2024-08-22 ENCOUNTER — TELEPHONE (OUTPATIENT)
Dept: FAMILY MEDICINE CLINIC | Age: 64
End: 2024-08-22

## 2024-08-22 NOTE — TELEPHONE ENCOUNTER
PT called needs a referral for Dr Prince    [phone]- needs to have a skin tag removed from rectal area    She did have a referral previousy but rn into other health issues ad did not see Dr Prince.    Please let PT know once referral is placed

## 2024-08-23 DIAGNOSIS — K64.4 SKIN TAG OF RECTUM: Primary | ICD-10-CM

## 2024-08-27 ENCOUNTER — OFFICE VISIT (OUTPATIENT)
Dept: SURGERY | Age: 64
End: 2024-08-27
Payer: MEDICARE

## 2024-08-27 VITALS
TEMPERATURE: 97.4 F | WEIGHT: 147 LBS | BODY MASS INDEX: 27.05 KG/M2 | OXYGEN SATURATION: 96 % | HEIGHT: 62 IN | HEART RATE: 88 BPM

## 2024-08-27 DIAGNOSIS — K64.4 ANAL SKIN TAG: Primary | ICD-10-CM

## 2024-08-27 PROCEDURE — 3017F COLORECTAL CA SCREEN DOC REV: CPT | Performed by: COLON & RECTAL SURGERY

## 2024-08-27 PROCEDURE — 1036F TOBACCO NON-USER: CPT | Performed by: COLON & RECTAL SURGERY

## 2024-08-27 PROCEDURE — 1123F ACP DISCUSS/DSCN MKR DOCD: CPT | Performed by: COLON & RECTAL SURGERY

## 2024-08-27 PROCEDURE — G8400 PT W/DXA NO RESULTS DOC: HCPCS | Performed by: COLON & RECTAL SURGERY

## 2024-08-27 PROCEDURE — 99203 OFFICE O/P NEW LOW 30 MIN: CPT | Performed by: COLON & RECTAL SURGERY

## 2024-08-27 PROCEDURE — 1090F PRES/ABSN URINE INCON ASSESS: CPT | Performed by: COLON & RECTAL SURGERY

## 2024-08-27 PROCEDURE — G8427 DOCREV CUR MEDS BY ELIG CLIN: HCPCS | Performed by: COLON & RECTAL SURGERY

## 2024-08-27 PROCEDURE — G8419 CALC BMI OUT NRM PARAM NOF/U: HCPCS | Performed by: COLON & RECTAL SURGERY

## 2024-08-27 ASSESSMENT — ENCOUNTER SYMPTOMS
DIARRHEA: 1
COLOR CHANGE: 0
ABDOMINAL PAIN: 0
CHEST TIGHTNESS: 0
SHORTNESS OF BREATH: 0

## 2024-08-27 NOTE — PROGRESS NOTES
Subjective:      Patient ID: Leni Regan is a 63 y.o. female who presents for:  Chief Complaint   Patient presents with    New Patient       This is a 63-year-old female who has a skin tag of her anterior anus.  She believes it is growing in size.    Past medical and surgical history was reviewed.        Past Medical History:   Diagnosis Date    Acute respiratory failure (HCC)     Arthritis     general joints    Cataracts, bilateral 2017    COPD (chronic obstructive pulmonary disease) (HCC)     Fracture of coccyx (HCC)     History of non-ST elevation myocardial infarction (NSTEMI) 3/17/2016    History of tobacco abuse     past smoker / quit > 2 yrs    HTN (hypertension)     past trx    Lung disease     Tendinitis of wrist     BIALTERAL     Past Surgical History:   Procedure Laterality Date    BREAST SURGERY      bilateral implants    CARDIAC CATHETERIZATION  3/2016    CATARACT REMOVAL Left 10/12/15     DR. SANTIAGO    CATARACT REMOVAL WITH IMPLANT Right 11/2/15     DR. SANTIAGO     SECTION      CHOLECYSTECTOMY, LAPAROSCOPIC N/A 2017    CHOLECYSTECTOMY LAPAROSCOPIC - CHOLANGIOGRAM POSS OPEN performed by Hayden Juares MD at [facility]    COLONOSCOPY      EYE SURGERY      Phaco with IOL OU    TUBAL LIGATION       Social History     Socioeconomic History    Marital status: Single     Spouse name: Not on file    Number of children: Not on file    Years of education: Not on file    Highest education level: Not on file   Occupational History    Not on file   Tobacco Use    Smoking status: Former     Current packs/day: 0.00     Average packs/day: 0.5 packs/day for 30.0 years (15.0 ttl pk-yrs)     Types: Cigarettes     Start date: 1985     Quit date: 2015     Years since quittin.7    Smokeless tobacco: Never   Substance and Sexual Activity    Alcohol use: No    Drug use: No    Sexual activity: Not on file   Other Topics Concern    Not on file   Social History Narrative    Not on file  are equal, round, and reactive to light.   Cardiovascular:      Rate and Rhythm: Normal rate and regular rhythm.      Heart sounds: Normal heart sounds.   Pulmonary:      Effort: Pulmonary effort is normal. No respiratory distress.      Breath sounds: Normal breath sounds. No wheezing.   Abdominal:      General: There is no distension.      Palpations: There is no mass.      Tenderness: There is no abdominal tenderness. There is no guarding or rebound.      Hernia: No hernia is present.   Genitourinary:     Comments: 5 mm anterior anal skin tag nonthrombosed  Musculoskeletal:         General: Normal range of motion.      Cervical back: Normal range of motion and neck supple.   Skin:     General: Skin is warm and dry.      Coloration: Skin is not pale.      Findings: No erythema or rash.   Neurological:      Mental Status: She is alert and oriented to person, place, and time.   Psychiatric:         Behavior: Behavior normal.         Judgment: Judgment normal.     Procedure note: I discussed the risks and benefits of excising the skin tag for difficulties with perianal hygiene as well as concerns regarding growth.  Risks of infection bleeding and recurrence outlined.  She wished to proceed.  Consent obtained.  Concerns regarding lidocaine discussed    The area was prepped and draped with a Betadine containing solution.  1% lidocaine injected for local analgesia.  Scissors were used to excise the tag flush with skin.  Estimated blood loss 5 cc.  Dressings applied.  Specimen discarded.         Assessment/Plan:          Diagnosis Orders   1. Anal skin tag          Wound care and activity instructions were given.  Analgesia discussed    She will return back to see me in the office in the future as needed.            Please note this report has beenpartially produced using speech recognition software and may cause contain errors related to that system including grammar, punctuation and spelling as well as words and phrases

## 2024-09-06 ENCOUNTER — TELEPHONE (OUTPATIENT)
Dept: FAMILY MEDICINE CLINIC | Age: 64
End: 2024-09-06

## 2024-09-06 DIAGNOSIS — M79.642 LEFT HAND PAIN: Primary | ICD-10-CM

## 2024-09-13 ENCOUNTER — OFFICE VISIT (OUTPATIENT)
Dept: PODIATRY | Age: 64
End: 2024-09-13

## 2024-09-13 VITALS — TEMPERATURE: 97.1 F | BODY MASS INDEX: 27.05 KG/M2 | WEIGHT: 147 LBS | HEIGHT: 62 IN

## 2024-09-13 DIAGNOSIS — M79.674 PAIN IN TOES OF BOTH FEET: ICD-10-CM

## 2024-09-13 DIAGNOSIS — L84 CORN: ICD-10-CM

## 2024-09-13 DIAGNOSIS — M79.675 PAIN IN TOES OF BOTH FEET: ICD-10-CM

## 2024-09-13 DIAGNOSIS — B35.1 DERMATOPHYTOSIS OF NAIL: ICD-10-CM

## 2024-09-13 DIAGNOSIS — I89.0 LYMPHEDEMA: Primary | ICD-10-CM

## 2024-09-13 ASSESSMENT — ENCOUNTER SYMPTOMS
BACK PAIN: 0
NAUSEA: 0
VOMITING: 0
SHORTNESS OF BREATH: 0

## 2024-10-23 ENCOUNTER — TELEPHONE (OUTPATIENT)
Dept: FAMILY MEDICINE CLINIC | Age: 64
End: 2024-10-23

## 2024-10-23 DIAGNOSIS — E61.8 IODINE DEFICIENCY: Primary | ICD-10-CM

## 2024-10-23 NOTE — TELEPHONE ENCOUNTER
Patient called and stated that she would like to be tested for Percholate.  She stated there are symptoms, that she can't explain.  She stated she thinks her body is blocking iodine.    Are there any foods that she should avoid before she gets test done.  Also, she is asking that if there are any other preps for test.    She also received a FIT test in the mail, I don't show where one was ordered, she stated it was from a company she has never heard of.  She is asking that if she should disregard or do the test?

## 2024-10-24 ENCOUNTER — TELEPHONE (OUTPATIENT)
Dept: FAMILY MEDICINE CLINIC | Age: 64
End: 2024-10-24

## 2024-10-24 NOTE — TELEPHONE ENCOUNTER
Patient is requesting a referral for her Hamstring for Physical Therapy. Patient unable to provide name of Facility, Patient states it is on [facility] near [facility]. Patient states Dr. Wick referred her before so information will be in chart. Please advise.

## 2024-10-24 NOTE — TELEPHONE ENCOUNTER
Patient inquiring on update of Lab Order. Informed Patient we have not received answer from  yet. Patient will call back later for update, Patient's phone unable to receive calls.

## 2024-10-25 DIAGNOSIS — M79.606 PAIN OF LOWER EXTREMITY, UNSPECIFIED LATERALITY: Primary | ICD-10-CM

## 2024-10-25 NOTE — TELEPHONE ENCOUNTER
Carmen of [facility] called in stating that they do not take the patient's insurance. Patient will need to be referred to another place for her PT.

## 2024-10-25 NOTE — TELEPHONE ENCOUNTER
Spoke with , she is still looking into test that patient wants done. She is sending an email out to get more information.   Per Dr. Wick no FIT test needed if colonoscopy is current.

## 2024-10-25 NOTE — TELEPHONE ENCOUNTER
Patient stopped in on 10/25 to check on the status of if we have heard anything about where she can get this test done, and if there is any special prep.      She will call the office again later next week to see if we have had any information from the .      Patient also questioned the fit test she received in the mail (mentioned below).  Provided her with the information that was given by Dr. Wick that he wouldn't have her do the fit test if her colonoscopy is current.

## 2024-10-28 NOTE — TELEPHONE ENCOUNTER
Spoke with Radha about the lab test that patient is requesting (Perchclorate test). Radha also reached out to [facility] .  There isn't a reliable diagnostic test we offer here. Supervisor also checked with [facility], [facility], and [facility] tests directory's and did not see them offering one either. Tried calling patient to inform her, no answer will send letter of these findings as well.

## 2024-10-31 ENCOUNTER — HOSPITAL ENCOUNTER (OUTPATIENT)
Dept: LAB | Age: 64
Discharge: HOME | End: 2024-10-31
Payer: MEDICARE

## 2024-10-31 PROCEDURE — 83018 HEAVY METAL QUAN EACH NES: CPT

## 2024-10-31 PROCEDURE — 36415 COLL VENOUS BLD VENIPUNCTURE: CPT

## 2024-10-31 NOTE — TELEPHONE ENCOUNTER
Patient called back and was informed.    Because there are no locations that test for Percholate, she is inquiring if there is a blood test that can be ordered to check her Iodine levels.

## 2024-11-01 ENCOUNTER — TELEPHONE (OUTPATIENT)
Dept: FAMILY MEDICINE CLINIC | Age: 64
End: 2024-11-01

## 2024-11-01 NOTE — TELEPHONE ENCOUNTER
Patient called in stating she needs to  a paper order for a Percholate Test. Patient states that she can take this test at [facility]'s [facility] at their outpatient lab in [place].    Patient needs to  a physical copy to bring to the lab. Do not send electronically.    Patient aware the order might not be placed today as we are heading into the weekend.

## 2024-11-04 LAB — IODINE SERPL-MCNC: 74.4 UG/L (ref 40–92)

## 2024-11-15 ENCOUNTER — OFFICE VISIT (OUTPATIENT)
Dept: FAMILY MEDICINE CLINIC | Age: 64
End: 2024-11-15

## 2024-11-15 VITALS
TEMPERATURE: 97.6 F | DIASTOLIC BLOOD PRESSURE: 70 MMHG | HEIGHT: 62 IN | BODY MASS INDEX: 27.23 KG/M2 | WEIGHT: 148 LBS | HEART RATE: 83 BPM | SYSTOLIC BLOOD PRESSURE: 120 MMHG | OXYGEN SATURATION: 99 %

## 2024-11-15 DIAGNOSIS — H61.21 IMPACTED CERUMEN OF RIGHT EAR: ICD-10-CM

## 2024-11-15 DIAGNOSIS — H65.92 MIDDLE EAR EFFUSION, LEFT: ICD-10-CM

## 2024-11-15 DIAGNOSIS — J02.9 ACUTE PHARYNGITIS, UNSPECIFIED ETIOLOGY: Primary | ICD-10-CM

## 2024-11-15 SDOH — ECONOMIC STABILITY: FOOD INSECURITY: WITHIN THE PAST 12 MONTHS, YOU WORRIED THAT YOUR FOOD WOULD RUN OUT BEFORE YOU GOT MONEY TO BUY MORE.: NEVER TRUE

## 2024-11-15 SDOH — ECONOMIC STABILITY: FOOD INSECURITY: WITHIN THE PAST 12 MONTHS, THE FOOD YOU BOUGHT JUST DIDN'T LAST AND YOU DIDN'T HAVE MONEY TO GET MORE.: NEVER TRUE

## 2024-11-15 SDOH — ECONOMIC STABILITY: INCOME INSECURITY: HOW HARD IS IT FOR YOU TO PAY FOR THE VERY BASICS LIKE FOOD, HOUSING, MEDICAL CARE, AND HEATING?: NOT HARD AT ALL

## 2024-11-15 NOTE — PROGRESS NOTES
Culture, Throat      2. Middle ear effusion, left      Patient is hesitant to use antihistamines recommend warm compress steam elation      3. Impacted cerumen of right ear      rec d ebrox otc            Orders Placed This Encounter   Procedures    Culture, Throat     Standing Status:   Future     Number of Occurrences:   1     Standing Expiration Date:   11/15/2025    Amb External Referral To ENT     Referral Priority:   Routine     Referral Type:   Eval and Treat     Requested Specialty:   Otolaryngology     Number of Visits Requested:   1     No orders of the defined types were placed in this encounter.    Medications Discontinued During This Encounter   Medication Reason    Lifitegrast (XIIDRA) 5 % SOLN LIST CLEANUP     Return for follow up with your PCP as directed, call for results.    Lizz Naik PA-C

## 2024-11-17 LAB — BACTERIA THROAT AEROBE CULT: NORMAL

## 2024-11-18 ENCOUNTER — TELEPHONE (OUTPATIENT)
Dept: FAMILY MEDICINE CLINIC | Age: 64
End: 2024-11-18

## 2024-11-18 LAB — BACTERIA THROAT AEROBE CULT: NORMAL

## 2024-11-18 NOTE — TELEPHONE ENCOUNTER
Patient called in for the results of her throat swab but, as of now the results have not been released.    Patient also wanted BEORNICA Naik to know that the ear drops have been making her ears itch. Is unsure if its just irritation because it is an oil or if she is having a reaction to it.    States that the oil does not want to stay in her ears even when blocked off by a cotton ball. Will only use the oil for 4 days as it says on the packaging unless told otherwise.

## 2024-11-20 NOTE — TELEPHONE ENCOUNTER
Called patient to both phone numbers listed on patient's chart, both numbers just rings and rings.

## 2024-12-13 ENCOUNTER — OFFICE VISIT (OUTPATIENT)
Dept: PODIATRY | Age: 64
End: 2024-12-13
Payer: MEDICARE

## 2024-12-13 VITALS — WEIGHT: 149 LBS | BODY MASS INDEX: 27.42 KG/M2 | HEIGHT: 62 IN | TEMPERATURE: 97.1 F

## 2024-12-13 DIAGNOSIS — I89.0 LYMPHEDEMA: Primary | ICD-10-CM

## 2024-12-13 DIAGNOSIS — M79.675 PAIN IN TOES OF BOTH FEET: ICD-10-CM

## 2024-12-13 DIAGNOSIS — M79.674 PAIN IN TOES OF BOTH FEET: ICD-10-CM

## 2024-12-13 DIAGNOSIS — B35.1 DERMATOPHYTOSIS OF NAIL: ICD-10-CM

## 2024-12-13 PROCEDURE — 11721 DEBRIDE NAIL 6 OR MORE: CPT | Performed by: PODIATRIST

## 2024-12-13 PROCEDURE — 99999 PR OFFICE/OUTPT VISIT,PROCEDURE ONLY: CPT | Performed by: PODIATRIST

## 2024-12-13 ASSESSMENT — ENCOUNTER SYMPTOMS
NAUSEA: 0
VOMITING: 0
SHORTNESS OF BREATH: 0
BACK PAIN: 0

## 2024-12-13 NOTE — PROGRESS NOTES
[facility] PHYSICIANS [place] SPECIALTY CARE, [facility] PODIATRY  [address]  [place] [address]  Dept: [phone]  Loc: [phone]       Leni Regan  (1960)    12/13/24    Subjective     Leni Regan is 64 y.o. female who complains today of:    Chief Complaint   Patient presents with    Nail Problem     Both feet       HPI:  The patient presents for evaluation and treatment of painful digital nail deformities.  The patient has been unable to provide self nail care due to the severe nature of deformity which is causing pressure and limiting their ability to walk in shoe gear without pain.  Self debridement has been attempted with no success. This is a chronic problem.     Review of Systems   Constitutional:  Negative for chills and fever.   HENT:  Negative for hearing loss.    Respiratory:  Negative for shortness of breath.    Cardiovascular:  Negative for chest pain.   Gastrointestinal:  Negative for nausea and vomiting.   Genitourinary:  Negative for difficulty urinating.   Musculoskeletal:  Positive for gait problem. Negative for back pain.   Skin:  Negative for wound.   Neurological:  Positive for numbness.   Hematological:  Does not bruise/bleed easily.   Psychiatric/Behavioral:  Negative for sleep disturbance.        The patient is not a diabetic.   PCP/ Endocrinologist: Eduardo Wick MD   Date last seen: November 15, 2024 (Lizz Naik PA-C)    Allergies:  Pcn [penicillins], Bextra [valdecoxib], Corticosteroids, Lidocaine, Contrast [iodides], Glycerin, Naprosyn [naproxen], and Valium [diazepam]    Current Outpatient Medications on File Prior to Visit   Medication Sig Dispense Refill    albuterol (PROVENTIL) (2.5 MG/3ML) 0.083% nebulizer solution USE 1 VIAL IN NEBULIZER EVERY 4 HOURS AS NEEDED FOR WHEEZING 300 mL 3    ipratropium (ATROVENT) 0.02 % nebulizer solution Take 2.5 mLs by nebulization 4 times daily 300 mL 3    albuterol sulfate HFA

## 2024-12-20 ENCOUNTER — TELEPHONE (OUTPATIENT)
Age: 64
End: 2024-12-20

## 2024-12-20 DIAGNOSIS — Z91.89 AT RISK FOR ABNORMAL BILIRUBIN LEVEL: Primary | ICD-10-CM

## 2024-12-20 NOTE — TELEPHONE ENCOUNTER
Patient is requesting Lab Order for Bilirubin. Patient states that her mouth tastes like bile. Patient inquiring if labs will need to be done at our office or if she is able to go to another location. Please call Patient at [phone] to discuss. Patient states her incoming calls is having issues so she can return call.

## 2024-12-26 ENCOUNTER — HOSPITAL ENCOUNTER (OUTPATIENT)
Dept: LAB | Age: 64
Discharge: HOME | End: 2024-12-26
Payer: MEDICARE

## 2024-12-26 ENCOUNTER — TELEPHONE (OUTPATIENT)
Dept: PULMONOLOGY | Age: 64
End: 2024-12-26

## 2024-12-26 ENCOUNTER — TELEPHONE (OUTPATIENT)
Age: 64
End: 2024-12-26

## 2024-12-26 DIAGNOSIS — J44.9 CHRONIC OBSTRUCTIVE PULMONARY DISEASE, UNSPECIFIED COPD TYPE (HCC): Primary | ICD-10-CM

## 2024-12-26 DIAGNOSIS — J02.9 ACUTE PHARYNGITIS, UNSPECIFIED ETIOLOGY: Primary | ICD-10-CM

## 2024-12-26 LAB — BILIRUB SERPL-MCNC: 0.4 MG/DL (ref 0.2–0.7)

## 2024-12-26 PROCEDURE — 36415 COLL VENOUS BLD VENIPUNCTURE: CPT

## 2024-12-26 PROCEDURE — 82247 BILIRUBIN TOTAL: CPT

## 2025-01-02 DIAGNOSIS — J44.9 CHRONIC OBSTRUCTIVE PULMONARY DISEASE, UNSPECIFIED COPD TYPE (HCC): ICD-10-CM

## 2025-01-02 NOTE — TELEPHONE ENCOUNTER
Rx requested:  Requested Prescriptions     Pending Prescriptions Disp Refills    albuterol (PROVENTIL) (2.5 MG/3ML) 0.083% nebulizer solution 1080 mL 2     Sig: USE 1 VIAL IN NEBULIZER EVERY 4 HOURS AS NEEDED FOR WHEEZING       Last Office Visit:   4/23/2024      Next Visit Date:  Future Appointments   Date Time Provider Department Center   1/13/2025 11:00 AM Lencoski, Monik D, MD [facility]   3/12/2025  8:45 AM Collin Denise DPM [facility] OP POD [facility]   4/11/2025  8:00 AM [facility] ROOM 1 [facility]   4/25/2025  9:15 AM Paulo Mittal MD [facility]

## 2025-01-06 RX ORDER — ALBUTEROL SULFATE 0.83 MG/ML
SOLUTION RESPIRATORY (INHALATION)
Qty: 1080 ML | Refills: 2 | Status: SHIPPED | OUTPATIENT
Start: 2025-01-06

## 2025-01-07 ENCOUNTER — TELEPHONE (OUTPATIENT)
Dept: PULMONOLOGY | Age: 65
End: 2025-01-07

## 2025-01-07 NOTE — TELEPHONE ENCOUNTER
PAT[facility] IS STATING THAT INSURANCE USUALLY DOESN'T COVER THE ONE SHE WANTS. SHE IS ASKING FOR DR NAVAS TO CALL [employer] @ [phone] TO DO A PRIOR AUTH

## 2025-01-13 ENCOUNTER — OFFICE VISIT (OUTPATIENT)
Dept: OBGYN CLINIC | Age: 65
End: 2025-01-13
Payer: MEDICARE

## 2025-01-13 VITALS — HEIGHT: 62 IN | BODY MASS INDEX: 27.6 KG/M2 | WEIGHT: 150 LBS

## 2025-01-13 DIAGNOSIS — Z13.820 OSTEOPOROSIS SCREENING: ICD-10-CM

## 2025-01-13 DIAGNOSIS — Z01.419 WELL WOMAN EXAM WITH ROUTINE GYNECOLOGICAL EXAM: Primary | ICD-10-CM

## 2025-01-13 DIAGNOSIS — Z01.419 WELL WOMAN EXAM WITH ROUTINE GYNECOLOGICAL EXAM: ICD-10-CM

## 2025-01-13 DIAGNOSIS — R32 URINARY INCONTINENCE, UNSPECIFIED TYPE: ICD-10-CM

## 2025-01-13 DIAGNOSIS — Z12.4 ENCOUNTER FOR PAP SMEAR OF CERVIX WITH HPV DNA COTESTING: ICD-10-CM

## 2025-01-13 DIAGNOSIS — Z78.0 POSTMENOPAUSAL: ICD-10-CM

## 2025-01-13 DIAGNOSIS — Z12.31 BREAST CANCER SCREENING BY MAMMOGRAM: ICD-10-CM

## 2025-01-13 PROCEDURE — 99387 INIT PM E/M NEW PAT 65+ YRS: CPT | Performed by: OBSTETRICS & GYNECOLOGY

## 2025-01-13 PROCEDURE — 1159F MED LIST DOCD IN RCRD: CPT | Performed by: OBSTETRICS & GYNECOLOGY

## 2025-01-13 SDOH — ECONOMIC STABILITY: FOOD INSECURITY: WITHIN THE PAST 12 MONTHS, THE FOOD YOU BOUGHT JUST DIDN'T LAST AND YOU DIDN'T HAVE MONEY TO GET MORE.: NEVER TRUE

## 2025-01-13 SDOH — ECONOMIC STABILITY: FOOD INSECURITY: WITHIN THE PAST 12 MONTHS, YOU WORRIED THAT YOUR FOOD WOULD RUN OUT BEFORE YOU GOT MONEY TO BUY MORE.: NEVER TRUE

## 2025-01-13 ASSESSMENT — PATIENT HEALTH QUESTIONNAIRE - PHQ9
SUM OF ALL RESPONSES TO PHQ QUESTIONS 1-9: 0
SUM OF ALL RESPONSES TO PHQ9 QUESTIONS 1 & 2: 0
SUM OF ALL RESPONSES TO PHQ QUESTIONS 1-9: 0
2. FEELING DOWN, DEPRESSED OR HOPELESS: NOT AT ALL
1. LITTLE INTEREST OR PLEASURE IN DOING THINGS: NOT AT ALL
SUM OF ALL RESPONSES TO PHQ QUESTIONS 1-9: 0
SUM OF ALL RESPONSES TO PHQ QUESTIONS 1-9: 0

## 2025-01-13 NOTE — PROGRESS NOTES
SUBJECTIVE:   64 y.o.  female here for annual exam. Pt . Pt without routine screening or annual exam in >10yrs. Pt unable to have screening MMG due to issue with breast implants.     Review of Systems:  General ROS: negative  Psychological ROS: negative  ENT ROS: negative  Endocrine ROS: negative  Respiratory ROS: no cough, shortness of breath, or wheezing  Cardiovascular ROS: no chest pain or dyspnea on exertion  Gastrointestinal ROS: no abdominal pain, change in bowel habits, or black or bloody stools  Genito-Urinary ROS: no dysuria, trouble voiding, or hematuria  Musculoskeletal ROS: negative  Neurological ROS: no TIA or stroke symptoms  Dermatological ROS: negative    OBJECTIVE:   Ht 1.575 m (5' 2\")   Wt 68 kg (150 lb)   BMI 27.44 kg/m²     Physical Exam:  CONSTITUTIONAL: She appears well nourished and developed   NEUROLOGIC: Alert and oriented to time, place and person  NECK: no thyroidmegaly  BREASTS: Bilateral breasts with fixed implants and surgical scars  LUNGS: Clear to ascultation bilaterally  CVS: regular rate and rhythm  LYMPHATIC: No palpable lymph nodes  ABDOMEN: benign, soft, nontender, no masses. No liver or splenic organomegaly. No evidence of abdominal or inguinal hernia. No indication for occult blood testing  SKIN: normal texture and tone, no lesions  NEURO: normal tone, no hyperreflexia, 1+DTRs throughout    Pelvic Exam: pediatric speculum noted, limited exam due  EFG: normal external genitalia  URETHRAL MEATUS: normal size, no diverticula   URETHRA: normal appearing without diverticula or lesions  BLADDER:  No masses or tenderness  VAGINA: atrophic changes and stenotic os  CERVIX: limited exam due to pt discomfort  UTERUS: uterus is normal size, shape, consistency and nontender   ADNEXA: normal adnexa in size, nontender and no masses.  PERINEUM: normal appearing without lesions or masses  RECTUM: no hemorrhoids or rectal masses.     ASSESSMENT:   Routine gynecologic exam  Breast cancer

## 2025-01-14 ENCOUNTER — TELEPHONE (OUTPATIENT)
Age: 65
End: 2025-01-14

## 2025-01-14 DIAGNOSIS — M54.2 NECK PAIN: ICD-10-CM

## 2025-01-14 DIAGNOSIS — Z01.10 ENCOUNTER FOR HEARING EXAMINATION, UNSPECIFIED WHETHER ABNORMAL FINDINGS: Primary | ICD-10-CM

## 2025-01-14 DIAGNOSIS — G44.009 CLUSTER HEADACHE, NOT INTRACTABLE, UNSPECIFIED CHRONICITY PATTERN: ICD-10-CM

## 2025-01-14 NOTE — TELEPHONE ENCOUNTER
Patient had an appt with a [facility] ENT yesterday. She is now scheduled for 3 new appts w/ [facility]. For insurance purposes, she is asking if provider can place a referral for each of them.    Neurology- Felisha Kelley NP at [facility] for headaches    Physical Therapy at [facility] for her neck pain    Audiologist- Sara Odonnell at [facility] for a hearing test. This is for her right ear tinnitus.      Thank you.

## 2025-01-16 LAB
HPV HR 12 DNA SPEC QL NAA+PROBE: NOT DETECTED
HPV16 DNA SPEC QL NAA+PROBE: NOT DETECTED
HPV16+18+H RISK 12 DNA SPEC-IMP: NORMAL
HPV18 DNA SPEC QL NAA+PROBE: NOT DETECTED

## 2025-01-30 ENCOUNTER — HOSPITAL ENCOUNTER (OUTPATIENT)
Dept: MRI IMAGING | Age: 65
Discharge: HOME | End: 2025-02-01
Payer: MEDICARE

## 2025-01-30 DIAGNOSIS — R51.9 ACUTE NONINTRACTABLE HEADACHE, UNSPECIFIED HEADACHE TYPE: ICD-10-CM

## 2025-01-30 DIAGNOSIS — G44.83 HEADACHE AFTER COUGH: ICD-10-CM

## 2025-01-30 PROCEDURE — 70544 MR ANGIOGRAPHY HEAD W/O DYE: CPT

## 2025-01-30 PROCEDURE — 70547 MR ANGIOGRAPHY NECK W/O DYE: CPT

## 2025-01-30 PROCEDURE — 70551 MRI BRAIN STEM W/O DYE: CPT

## 2025-02-03 ENCOUNTER — TELEPHONE (OUTPATIENT)
Dept: PULMONOLOGY | Age: 65
End: 2025-02-03

## 2025-02-04 ENCOUNTER — HOSPITAL ENCOUNTER (OUTPATIENT)
Dept: WOMENS IMAGING | Age: 65
Discharge: HOME | End: 2025-02-06
Payer: MEDICARE

## 2025-02-04 DIAGNOSIS — Z01.419 WELL WOMAN EXAM WITH ROUTINE GYNECOLOGICAL EXAM: ICD-10-CM

## 2025-02-04 DIAGNOSIS — Z78.0 POSTMENOPAUSAL: ICD-10-CM

## 2025-02-04 DIAGNOSIS — Z13.820 OSTEOPOROSIS SCREENING: ICD-10-CM

## 2025-02-04 PROCEDURE — 77080 DXA BONE DENSITY AXIAL: CPT

## 2025-02-18 ENCOUNTER — TELEPHONE (OUTPATIENT)
Age: 65
End: 2025-02-18

## 2025-02-18 NOTE — TELEPHONE ENCOUNTER
Patient called wanting a referral to see Dr. March at [facility] for an EKG. Patient states she is due for one for the new year. Patient states she wants RBC MAG blood test along with a D3. Patient states that PCP requests other blood test each year and she would like to have all of them done at the same time. Patient is also requesting a she have a test done for C Diff. She stated she is not having issues of C Diff she just knows it needs to be done. Patient would like to know if PCP can get records from Dr. Jones who treated her for a extended belly during covid lock down. Patient did not have fax number. Patient was told requests need to be faxed. Patient said he worked at the [employer] on [facility]. She wants the records before they are gone as he has retired.            .

## 2025-02-19 DIAGNOSIS — E61.2 MAGNESIUM DEFICIENCY: ICD-10-CM

## 2025-02-19 DIAGNOSIS — I65.23 BILATERAL CAROTID ARTERY STENOSIS: Primary | ICD-10-CM

## 2025-02-19 DIAGNOSIS — R10.11 RIGHT UPPER QUADRANT ABDOMINAL PAIN: ICD-10-CM

## 2025-02-19 NOTE — TELEPHONE ENCOUNTER
Dr. Styles records are in patients chart. Only saw him 2 times. Patient can sign records request to get them.

## 2025-02-19 NOTE — TELEPHONE ENCOUNTER
Referral placed, all labs have been ordered and in chart, will try to request records from Dr. Jones

## 2025-02-20 DIAGNOSIS — E55.9 VITAMIN D DEFICIENCY: Primary | ICD-10-CM

## 2025-02-20 NOTE — TELEPHONE ENCOUNTER
Patient is inquiring if she needs to fast for Magnesium lab. Patient states she can not go without her magnesium supplements. Patient also wanted to confirm that lab is magnesium and vitamin d3.     Patient inquiring on how to test for c-diff. Patient requesting specific details. Patient wanting to  kit at .    Please call Patient at [phone] to discuss.

## 2025-02-21 ENCOUNTER — HOSPITAL ENCOUNTER (OUTPATIENT)
Dept: LAB | Age: 65
Discharge: HOME | End: 2025-02-21
Payer: MEDICARE

## 2025-02-21 PROCEDURE — 82306 VITAMIN D 25 HYDROXY: CPT

## 2025-02-21 PROCEDURE — 36415 COLL VENOUS BLD VENIPUNCTURE: CPT

## 2025-02-22 LAB — VITAMIN D 25-HYDROXY: 73.2 NG/ML (ref 30–100)

## 2025-02-24 ENCOUNTER — HOSPITAL ENCOUNTER (OUTPATIENT)
Dept: LAB | Age: 65
Discharge: HOME | End: 2025-02-24
Payer: MEDICARE

## 2025-02-24 PROCEDURE — 87449 NOS EACH ORGANISM AG IA: CPT

## 2025-02-24 PROCEDURE — 87324 CLOSTRIDIUM AG IA: CPT

## 2025-02-25 LAB — C DIFF TOX A+B STL QL IA: NORMAL

## 2025-03-10 ENCOUNTER — TELEPHONE (OUTPATIENT)
Age: 65
End: 2025-03-10

## 2025-03-10 DIAGNOSIS — H57.89 EYE HEMORRHAGE: Primary | ICD-10-CM

## 2025-03-10 NOTE — TELEPHONE ENCOUNTER
Patient is inquiring if a new referral can be sent to Ignacia Gray, Ophthalmology.    She states her office moved so her insurance will require a new one.    Ignacia Gray  [address] [place] [address]    Fax [phone]  Phone [phone]

## 2025-03-12 ENCOUNTER — OFFICE VISIT (OUTPATIENT)
Age: 65
End: 2025-03-12

## 2025-03-12 VITALS — BODY MASS INDEX: 27.46 KG/M2 | WEIGHT: 155 LBS | TEMPERATURE: 97.1 F | HEIGHT: 63 IN

## 2025-03-12 DIAGNOSIS — M79.675 PAIN IN TOES OF BOTH FEET: ICD-10-CM

## 2025-03-12 DIAGNOSIS — I89.0 LYMPHEDEMA: Primary | ICD-10-CM

## 2025-03-12 DIAGNOSIS — B35.1 DERMATOPHYTOSIS OF NAIL: ICD-10-CM

## 2025-03-12 DIAGNOSIS — M79.674 PAIN IN TOES OF BOTH FEET: ICD-10-CM

## 2025-03-12 ASSESSMENT — ENCOUNTER SYMPTOMS
NAUSEA: 0
VOMITING: 0
BACK PAIN: 0
SHORTNESS OF BREATH: 0

## 2025-03-12 NOTE — PROGRESS NOTES
[facility] PHYSICIANS [place] SPECIALTY CARE, [facility] PODIATRY  [address] [place], [place] [id number]  Dept: [phone]  Loc: [phone]       Leni Regan  (1960)    3/12/25    Subjective     Leni Regan is 64 y.o. female who complains today of:    Chief Complaint   Patient presents with    Nail Problem     Both feet       HPI: Patient presents with a complaint of discomfort to the toenails bilaterally.  Patient reports continued difficulty trimming the toenails or self due to the thickness and deformity of the nail plates, this is a chronic problem.    Review of Systems   Constitutional:  Negative for chills and fever.   HENT:  Negative for hearing loss.    Respiratory:  Negative for shortness of breath.    Cardiovascular:  Positive for leg swelling. Negative for chest pain.   Gastrointestinal:  Negative for nausea and vomiting.   Genitourinary:  Negative for difficulty urinating.   Musculoskeletal:  Positive for gait problem. Negative for back pain.   Skin:  Negative for wound.   Neurological:  Positive for numbness.   Hematological:  Does not bruise/bleed easily.   Psychiatric/Behavioral:  Negative for sleep disturbance.        The patient is not a diabetic.   PCP/ Endocrinologist: Lizz Naik PA-C   Date last seen: November 15, 2024    Allergies:  Pcn [penicillins], Bextra [valdecoxib], Corticosteroids, Lidocaine, Contrast [iodides], Glycerin, Naprosyn [naproxen], and Valium [diazepam]    Current Outpatient Medications on File Prior to Visit   Medication Sig Dispense Refill    albuterol (PROVENTIL) (2.5 MG/3ML) 0.083% nebulizer solution USE 1 VIAL IN NEBULIZER EVERY 4 HOURS AS NEEDED FOR WHEEZING 1080 mL 2    ipratropium (ATROVENT) 0.02 % nebulizer solution Take 2.5 mLs by nebulization 4 times daily 1080 mL 2    Nebulizers (COMPRESSOR/NEBULIZER) MISC Nebulizer  machine  #1    Dx. COPD 1 each 3    albuterol sulfate HFA (PROVENTIL HFA) 108 (90 Base)

## 2025-04-11 ENCOUNTER — HOSPITAL ENCOUNTER (OUTPATIENT)
Dept: CT IMAGING | Age: 65
Discharge: HOME | End: 2025-04-13
Payer: MEDICARE

## 2025-04-11 DIAGNOSIS — R91.1 NODULE OF RIGHT LUNG: ICD-10-CM

## 2025-04-11 PROCEDURE — 71250 CT THORAX DX C-: CPT

## 2025-04-25 ENCOUNTER — OFFICE VISIT (OUTPATIENT)
Age: 65
End: 2025-04-25
Payer: MEDICARE

## 2025-04-25 VITALS
DIASTOLIC BLOOD PRESSURE: 66 MMHG | SYSTOLIC BLOOD PRESSURE: 122 MMHG | WEIGHT: 151 LBS | HEART RATE: 77 BPM | OXYGEN SATURATION: 97 % | BODY MASS INDEX: 27.18 KG/M2

## 2025-04-25 DIAGNOSIS — J44.9 CHRONIC OBSTRUCTIVE PULMONARY DISEASE, UNSPECIFIED COPD TYPE (HCC): Primary | ICD-10-CM

## 2025-04-25 DIAGNOSIS — J44.9 CHRONIC OBSTRUCTIVE PULMONARY DISEASE, UNSPECIFIED COPD TYPE (HCC): ICD-10-CM

## 2025-04-25 DIAGNOSIS — R91.1 NODULE OF RIGHT LUNG: Primary | ICD-10-CM

## 2025-04-25 PROCEDURE — G8399 PT W/DXA RESULTS DOCUMENT: HCPCS | Performed by: INTERNAL MEDICINE

## 2025-04-25 PROCEDURE — 3017F COLORECTAL CA SCREEN DOC REV: CPT | Performed by: INTERNAL MEDICINE

## 2025-04-25 PROCEDURE — G8427 DOCREV CUR MEDS BY ELIG CLIN: HCPCS | Performed by: INTERNAL MEDICINE

## 2025-04-25 PROCEDURE — 1036F TOBACCO NON-USER: CPT | Performed by: INTERNAL MEDICINE

## 2025-04-25 PROCEDURE — 1159F MED LIST DOCD IN RCRD: CPT | Performed by: INTERNAL MEDICINE

## 2025-04-25 PROCEDURE — 3074F SYST BP LT 130 MM HG: CPT | Performed by: INTERNAL MEDICINE

## 2025-04-25 PROCEDURE — G8419 CALC BMI OUT NRM PARAM NOF/U: HCPCS | Performed by: INTERNAL MEDICINE

## 2025-04-25 PROCEDURE — 3023F SPIROM DOC REV: CPT | Performed by: INTERNAL MEDICINE

## 2025-04-25 PROCEDURE — 3078F DIAST BP <80 MM HG: CPT | Performed by: INTERNAL MEDICINE

## 2025-04-25 PROCEDURE — 99214 OFFICE O/P EST MOD 30 MIN: CPT | Performed by: INTERNAL MEDICINE

## 2025-04-25 PROCEDURE — 1123F ACP DISCUSS/DSCN MKR DOCD: CPT | Performed by: INTERNAL MEDICINE

## 2025-04-25 PROCEDURE — 1090F PRES/ABSN URINE INCON ASSESS: CPT | Performed by: INTERNAL MEDICINE

## 2025-04-25 RX ORDER — ALBUTEROL SULFATE 90 UG/1
2 INHALANT RESPIRATORY (INHALATION) EVERY 6 HOURS PRN
Qty: 1 EACH | Refills: 0 | Status: SHIPPED | OUTPATIENT
Start: 2025-04-25

## 2025-04-25 NOTE — PROGRESS NOTES
Subjective:             Leni Regan is a 64 y.o. female who complains today of:     Chief Complaint   Patient presents with    Follow-up     1 yr f/u on COPD, lung nodule, CT results        HPI  She is on  Nebulizer with albuterol and ipratropium 2 times a day.  When necessary and proair HFA 2 puffs when necessary.  C/o shortness of breath with exertion. No Wheezing. No Cough. No Hemoptysis.No Chest tightness. No Chest pain with radiation or pleuritic pain.No  leg edema. No orthopnea.  No Fever or chills. Nasal congestion on rt. Nasal and sinus side. No Rhinorrhea and postnasal drip.  She said she will do PFT but no w/u regarding lung opacity.     CT chest  4/11/25  IMPRESSION:  The semi is solid irregular ground-glass opacity in the right upper lobe  appears slightly larger than previous.  There also is a is subtle area of  ground-glass opacity seen in the left upper lobe.  A nodule previously seen  in the left upper lung in the perifissural region is stable.  Recommend 6-9  month follow-up.     Lung-RADS 3 - Probably Benign (v2022) Management:  6 month LDCT    She had CT chest done Refused PET scan or Biopsy for further evaluation to r/o malignancy, she is aware if density is malignant it can spread to  other area of body.     Allergies:  Pcn [penicillins], Bextra [valdecoxib], Corticosteroids, Lidocaine, Contrast [iodides], Glycerin, Naprosyn [naproxen], and Valium [diazepam]  Past Medical History:   Diagnosis Date    Acute respiratory failure (HCC)     Arthritis     general joints    Cataracts, bilateral 05/03/2017    COPD (chronic obstructive pulmonary disease) (HCC)     Fracture of coccyx (HCC)     History of non-ST elevation myocardial infarction (NSTEMI) 3/17/2016    History of tobacco abuse     past smoker / quit > 2 yrs    HTN (hypertension)     past trx    Lung disease     Tendinitis of wrist     BIALTERAL     Past Surgical History:   Procedure Laterality Date    BREAST SURGERY      bilateral implants

## 2025-05-05 ENCOUNTER — HOSPITAL ENCOUNTER (OUTPATIENT)
Dept: ULTRASOUND IMAGING | Age: 65
Discharge: HOME | End: 2025-05-07
Payer: MEDICARE

## 2025-05-05 DIAGNOSIS — Z01.419 WELL WOMAN EXAM WITH ROUTINE GYNECOLOGICAL EXAM: ICD-10-CM

## 2025-05-05 DIAGNOSIS — Z12.31 BREAST CANCER SCREENING BY MAMMOGRAM: ICD-10-CM

## 2025-05-05 PROCEDURE — 76641 ULTRASOUND BREAST COMPLETE: CPT

## 2025-05-06 ENCOUNTER — RESULTS FOLLOW-UP (OUTPATIENT)
Dept: OBGYN | Age: 65
End: 2025-05-06

## 2025-05-08 ENCOUNTER — TELEPHONE (OUTPATIENT)
Age: 65
End: 2025-05-08

## 2025-05-08 NOTE — TELEPHONE ENCOUNTER
PT called-Her insurance ([employer]) did not rec'd a pre-auth fro [facility] they are requesting that it be re-submitted with service date of 4/4/25  Please fax to  [phone]

## 2025-05-15 ENCOUNTER — HOSPITAL ENCOUNTER (OUTPATIENT)
Dept: PULMONOLOGY | Age: 65
Discharge: HOME | End: 2025-05-15
Payer: MEDICARE

## 2025-05-15 DIAGNOSIS — J44.9 CHRONIC OBSTRUCTIVE PULMONARY DISEASE, UNSPECIFIED COPD TYPE (HCC): ICD-10-CM

## 2025-05-15 PROCEDURE — 94060 EVALUATION OF WHEEZING: CPT

## 2025-05-15 PROCEDURE — 6360000002 HC RX W HCPCS

## 2025-05-15 PROCEDURE — 94729 DIFFUSING CAPACITY: CPT

## 2025-05-15 PROCEDURE — 94726 PLETHYSMOGRAPHY LUNG VOLUMES: CPT

## 2025-05-15 RX ORDER — ALBUTEROL SULFATE 0.83 MG/ML
SOLUTION RESPIRATORY (INHALATION)
Status: COMPLETED
Start: 2025-05-15 | End: 2025-05-15

## 2025-05-15 RX ADMIN — ALBUTEROL SULFATE 2.5 MG: 2.5 SOLUTION RESPIRATORY (INHALATION) at 08:26

## 2025-05-16 NOTE — PROCEDURES
[id number] [place] [address]                    PULMONARY FUNCTION  Leni Regan   64 y.o.   female     Test interpretation     Spirometry meets ATS criteria for severe obstructive airway disease with significant response to bronchodilator  Lung volume shows air trapping  Diffusion capacity is mildly reduced       Clinical correlation is recommended     Angie Cordero MD [facility], 5/16/2025 11:13 AM

## 2025-05-20 ENCOUNTER — OFFICE VISIT (OUTPATIENT)
Age: 65
End: 2025-05-20
Payer: MEDICARE

## 2025-05-20 VITALS
WEIGHT: 151 LBS | TEMPERATURE: 97.1 F | BODY MASS INDEX: 27.79 KG/M2 | HEIGHT: 62 IN | OXYGEN SATURATION: 90 % | HEART RATE: 62 BPM

## 2025-05-20 DIAGNOSIS — M79.674 PAIN IN TOES OF BOTH FEET: ICD-10-CM

## 2025-05-20 DIAGNOSIS — M79.675 PAIN IN TOES OF BOTH FEET: ICD-10-CM

## 2025-05-20 DIAGNOSIS — I89.0 LYMPHEDEMA: Primary | ICD-10-CM

## 2025-05-20 DIAGNOSIS — B35.1 DERMATOPHYTOSIS OF NAIL: ICD-10-CM

## 2025-05-20 PROCEDURE — 11721 DEBRIDE NAIL 6 OR MORE: CPT | Performed by: PODIATRIST

## 2025-05-20 PROCEDURE — 99999 PR OFFICE/OUTPT VISIT,PROCEDURE ONLY: CPT | Performed by: PODIATRIST

## 2025-05-20 ASSESSMENT — ENCOUNTER SYMPTOMS
VOMITING: 0
NAUSEA: 0
BACK PAIN: 0
SHORTNESS OF BREATH: 0

## 2025-05-20 NOTE — TELEPHONE ENCOUNTER
Richard from [facility] [employer] calling asking for us to fax over the two referrals made on 1/15/24    Bridge [phone]    [Initial] : an initial visit [Shortness of Breath] : shortness of breath

## 2025-05-20 NOTE — PROGRESS NOTES
[facility] PHYSICIANS [place] SPECIALTY CARE, [facility] PODIATRY  [address] [place], [place] [address]  Dept: [phone]  Loc: [phone]       Leni Regan  (1960)    5/20/25    Subjective     Leni Regan is 64 y.o. female who complains today of:    Chief Complaint   Patient presents with    Nail Problem     Both feet       HPI:  The patient presents for evaluation and treatment of painful digital nail deformities.  The patient has been unable to provide self nail care due to the severe nature of deformity which is causing pressure and limiting their ability to walk in shoe gear without pain.  Self debridement has been attempted with no success. This is a chronic problem.     Review of Systems   Constitutional:  Negative for chills and fever.   HENT:  Negative for hearing loss.    Respiratory:  Negative for shortness of breath.    Cardiovascular:  Positive for leg swelling. Negative for chest pain.   Gastrointestinal:  Negative for nausea and vomiting.   Genitourinary:  Negative for difficulty urinating.   Musculoskeletal:  Negative for back pain and gait problem.   Skin:  Negative for wound.   Neurological:  Negative for numbness.   Hematological:  Does not bruise/bleed easily.   Psychiatric/Behavioral:  Negative for sleep disturbance.        The patient is not a diabetic but does have lymphedema.   Vascular surgeon: Orlando March DO    Date last seen: April 1, 2025    Allergies:  Pcn [penicillins], Bextra [valdecoxib], Corticosteroids, Lidocaine, Contrast [iodides], Glycerin, Naprosyn [naproxen], and Valium [diazepam]    Current Outpatient Medications on File Prior to Visit   Medication Sig Dispense Refill    albuterol sulfate HFA (PROVENTIL HFA) 108 (90 Base) MCG/ACT inhaler Inhale 2 puffs into the lungs every 6 hours as needed for Wheezing 1 each 0    albuterol (PROVENTIL) (2.5 MG/3ML) 0.083% nebulizer solution USE 1 VIAL IN NEBULIZER EVERY 4 HOURS AS

## 2025-06-17 ENCOUNTER — TELEPHONE (OUTPATIENT)
Dept: OBGYN CLINIC | Age: 65
End: 2025-06-17

## 2025-06-17 ENCOUNTER — TELEPHONE (OUTPATIENT)
Age: 65
End: 2025-06-17

## 2025-06-17 DIAGNOSIS — M54.2 NECK PAIN: ICD-10-CM

## 2025-06-17 DIAGNOSIS — M62.89 PELVIC FLOOR DYSFUNCTION: Primary | ICD-10-CM

## 2025-06-17 DIAGNOSIS — Z71.9 ENCOUNTER FOR CONSULTATION: ICD-10-CM

## 2025-06-17 DIAGNOSIS — I25.2 HISTORY OF NON-ST ELEVATION MYOCARDIAL INFARCTION (NSTEMI): Primary | ICD-10-CM

## 2025-06-17 DIAGNOSIS — R32 URINARY INCONTINENCE, UNSPECIFIED TYPE: ICD-10-CM

## 2025-06-17 DIAGNOSIS — I34.0 NONRHEUMATIC MITRAL VALVE REGURGITATION: ICD-10-CM

## 2025-06-17 RX ORDER — BLOOD PRESSURE TEST KIT
KIT MISCELLANEOUS
Qty: 1 KIT | Refills: 0 | Status: SHIPPED | OUTPATIENT
Start: 2025-06-17

## 2025-06-17 NOTE — TELEPHONE ENCOUNTER
Patient called stating she needs a new referral for cardiology. Patient states the one she has had in the past has the wrong spelling of the DrTim Name. Referral should be for Dr. Younger in [facility].

## 2025-06-17 NOTE — TELEPHONE ENCOUNTER
Patient wants to know if [facility] will give her a blood pressure machine or if it needs to be sent to her.

## 2025-06-17 NOTE — TELEPHONE ENCOUNTER
Patient requests a referral to Ortho at [facility] as well. Patient states she has appointment on 06/23/25 and needs the referral before the appointment. Dr. Susana Bales.

## 2025-06-17 NOTE — TELEPHONE ENCOUNTER
Pt had seen Dr. Carr back in January and her inner thigh muscle cramps were discussed and Dr. Carr suggested pelvic floor therapy.  Pt was told to call back in if she was interested.  She called today because she received the notice about Dr. Carr leaving and not sure what to do about getting the referral.  Pt is requesting a referral from Dr. GENAO because she will be following up with her when she goes to schedule her annual exam but she cannot go to the [facility] on [facility] because they do not take her [employer] insurance.  Pt would just like a referral somewhere else to see if her insurance is accepted there because she cannot get through to anyone on her insurance to find  out who is in network for her. Please advise.

## 2025-06-17 NOTE — TELEPHONE ENCOUNTER
Tried to call both the pts home and cell with no luck of contacting. I did put in a note in her chart.

## 2025-06-17 NOTE — TELEPHONE ENCOUNTER
Tried to call pt both on her cell and her home number. Neither of which have a VM. I wanted to let the patient know that her referral has been placed.

## 2025-06-20 ENCOUNTER — TELEPHONE (OUTPATIENT)
Age: 65
End: 2025-06-20

## 2025-06-20 DIAGNOSIS — I34.0 NONRHEUMATIC MITRAL VALVE REGURGITATION: Primary | ICD-10-CM

## 2025-06-20 NOTE — TELEPHONE ENCOUNTER
Pt did call back today to see if Dr. Duncan had replied back.  Forgot to mention in original note that the patient cannot receive incoming calls but can dial out.  She was going to call back Friday.  Pt was informed of referral and give the information so she can call and schedule appt. Will  radiology result and sign release form at her convenience.  [facility]

## 2025-06-20 NOTE — TELEPHONE ENCOUNTER
Patient called requesting a new referral to Dr. Tom Michael. He is cardiovascular medicine at [facility].  The first referral was the wrong department.      Thank you.

## 2025-06-24 ENCOUNTER — TELEPHONE (OUTPATIENT)
Age: 65
End: 2025-06-24

## 2025-06-24 NOTE — TELEPHONE ENCOUNTER
Patient called in stating she needs a backdated referral sent to the office of WILIAM Cardozo at the [facility]. Was seeing WILIAM Bales and they sent her over to see BERONICA Cardozo the same day.    Patient has another appointment with BERONICA Cardozo scheduled for 08/18/2025.    Requesting a backdated referral for 06/23/2025 to cover her for that date and a referral for the upcoming appointment.    Please advise.

## 2025-07-14 DIAGNOSIS — J44.9 CHRONIC OBSTRUCTIVE PULMONARY DISEASE, UNSPECIFIED COPD TYPE (HCC): ICD-10-CM

## 2025-07-17 DIAGNOSIS — M54.2 NECK PAIN: Primary | ICD-10-CM

## 2025-07-30 ENCOUNTER — OFFICE VISIT (OUTPATIENT)
Age: 65
End: 2025-07-30

## 2025-07-30 VITALS — TEMPERATURE: 97.1 F | HEIGHT: 62 IN | WEIGHT: 150 LBS | BODY MASS INDEX: 27.6 KG/M2

## 2025-07-30 DIAGNOSIS — B35.1 DERMATOPHYTOSIS OF NAIL: ICD-10-CM

## 2025-07-30 DIAGNOSIS — M79.674 PAIN IN TOES OF BOTH FEET: ICD-10-CM

## 2025-07-30 DIAGNOSIS — M79.675 PAIN IN TOES OF BOTH FEET: ICD-10-CM

## 2025-07-30 DIAGNOSIS — L84 CALLUS: ICD-10-CM

## 2025-07-30 DIAGNOSIS — M79.672 LEFT FOOT PAIN: ICD-10-CM

## 2025-07-30 DIAGNOSIS — I89.0 LYMPHEDEMA: Primary | ICD-10-CM

## 2025-07-30 ASSESSMENT — ENCOUNTER SYMPTOMS
SHORTNESS OF BREATH: 0
NAUSEA: 0
VOMITING: 0
BACK PAIN: 0

## 2025-07-30 NOTE — PROGRESS NOTES
[facility] PHYSICIANS [facility], [facility] PODIATRY  [address] [place], [place] [id number]  Dept: [phone]  Loc: [phone]       Leni Regan  (1960)    7/30/25    Subjective     Leni Regan is 64 y.o. female who complains today of:    Chief Complaint   Patient presents with    Nail Problem     Both feet    Callouses     Left foot       HPI: Patient presents for follow-up.  Patient complains of painful toenails of the both feet and a painful callus to the ball of the left foot by the base of the big toe.  Patient reports continued difficulty in the toenails or self due to the thickness and deformity of the nail plates..  Patient reports increased pain as the nails become elongated.  Patient attempted self treatment for the callus but found this to be ineffective.  Patient has lymphedema, she reports compliance with compression.    Review of Systems   Constitutional:  Negative for chills and fever.   HENT:  Negative for hearing loss.    Respiratory:  Negative for shortness of breath.    Cardiovascular:  Positive for leg swelling. Negative for chest pain.   Gastrointestinal:  Negative for nausea and vomiting.   Genitourinary:  Negative for difficulty urinating.   Musculoskeletal:  Negative for back pain and gait problem.   Skin:  Negative for wound.   Neurological:  Negative for numbness.   Hematological:  Does not bruise/bleed easily.   Psychiatric/Behavioral:  Negative for sleep disturbance.        The patient is not a diabetic.   Vascular surgeon: Orlando March MD   Date last seen: April 1, 2025    Allergies:  Pcn [penicillins], Bextra [valdecoxib], Corticosteroids, Lidocaine, Contrast [iodides], Glycerin, Naprosyn [naproxen], and Valium [diazepam]    Current Outpatient Medications on File Prior to Visit   Medication Sig Dispense Refill    Handicap Placard MISC by Does not apply route 5 yrs starting 7- to 7- 1 each 0    Blood

## 2025-09-04 ENCOUNTER — TELEPHONE (OUTPATIENT)
Age: 65
End: 2025-09-04

## 2025-09-04 DIAGNOSIS — J44.9 CHRONIC OBSTRUCTIVE PULMONARY DISEASE, UNSPECIFIED COPD TYPE (HCC): Primary | ICD-10-CM

## 2025-09-05 RX ORDER — NEBULIZER ACCESSORIES
EACH MISCELLANEOUS
Qty: 1 EACH | Refills: 0 | Status: SHIPPED | OUTPATIENT
Start: 2025-09-05

## (undated) DEVICE — INTENDED FOR TISSUE SEPARATION, AND OTHER PROCEDURES THAT REQUIRE A SHARP SURGICAL BLADE TO PUNCTURE OR CUT.: Brand: BARD-PARKER ® CARBON RIB-BACK BLADES

## (undated) DEVICE — CHLORAPREP 26ML ORANGE

## (undated) DEVICE — LABEL MED MINI W/ MARKER

## (undated) DEVICE — COVER,MAYO STAND,STERILE: Brand: MEDLINE

## (undated) DEVICE — 2000CC GUARDIAN II: Brand: GUARDIAN

## (undated) DEVICE — DRAPE SURG C-ARM MOBILE XRAY LF

## (undated) DEVICE — GOWN,AURORA,NONREINFORCED,LARGE: Brand: MEDLINE

## (undated) DEVICE — SUTURE MCRYL SZ 4-0 L27IN ABSRB UD L19MM PS-2 1/2 CIR PRIM Y426H

## (undated) DEVICE — NEEDLE HYPO 22GA L1 1/2IN PIVOTING SHLD FOR LUERLOCK SYR

## (undated) DEVICE — STERILE LATEX POWDER-FREE SURGICAL GLOVESWITH NITRILE COATING: Brand: PROTEXIS

## (undated) DEVICE — 36" (91 CM) ARTERIAL PRESSURE TUBING: Brand: ICU MEDICAL

## (undated) DEVICE — PENCIL ES L3M BTTN SWCH HOLSTER W/ BLDE ELECTRD EDGE

## (undated) DEVICE — X-RAY DETECTABLE SPONGES,16 PLY: Brand: VISTEC

## (undated) DEVICE — 3M™ STERI-STRIP™ REINFORCED ADHESIVE SKIN CLOSURES, R1547, 1/2 IN X 4 IN (12 MM X 100 MM), 6 STRIPS/ENVELOPE: Brand: 3M™ STERI-STRIP™

## (undated) DEVICE — TROCAR: Brand: KII SHIELDED BLADED ACCESS SYSTEM

## (undated) DEVICE — 1842 FOAM BLOCK NEEDLE COUNTER: Brand: DEVON

## (undated) DEVICE — DISCONTINUED USE 393278 SYRINGE 10 ML HYPO W/O NDL LL TP PLSTC ST

## (undated) DEVICE — ELECTRODE PT RET AD L9FT HI MOIST COND ADH HYDRGEL CORDED

## (undated) DEVICE — PACK,SET UP,DRAPE: Brand: MEDLINE

## (undated) DEVICE — KIT CHOLGM POLYUR W/ KARLAN BLLN CATH 4FR L60CM 5MM INTRO

## (undated) DEVICE — Device

## (undated) DEVICE — TROCAR: Brand: KII® SLEEVE

## (undated) DEVICE — DRAPE,LAP,CHOLE,W/TROUGHS,STERILE: Brand: MEDLINE

## (undated) DEVICE — INSUFFLATION TUBING SET, WITH FILTER: Brand: CONMED

## (undated) DEVICE — SLEEVE CMPR SM STD CALF SCD ANEMB LF

## (undated) DEVICE — SUTURE VCRL + SZ 0 L27IN ABSRB VLT L26MM UR-6 5/8 CIR VCP603H

## (undated) DEVICE — DEFOGGER!" ANTI FOG KIT: Brand: DEROYAL

## (undated) DEVICE — GAUZE,SPONGE,2"X2",8PLY,STERILE,LF,2'S: Brand: MEDLINE

## (undated) DEVICE — TOWEL,OR,DSP,ST,BLUE,STD,4/PK,20PK/CS: Brand: MEDLINE

## (undated) DEVICE — APPLIER CLP M L L11.4IN DIA10MM ENDOSCP ROT MULT FOR LIG

## (undated) DEVICE — SYRINGE MED 30ML STD CLR PLAS LUERLOCK TIP N CTRL DISP